# Patient Record
Sex: FEMALE | Race: WHITE | NOT HISPANIC OR LATINO
[De-identification: names, ages, dates, MRNs, and addresses within clinical notes are randomized per-mention and may not be internally consistent; named-entity substitution may affect disease eponyms.]

---

## 2017-03-03 ENCOUNTER — RX RENEWAL (OUTPATIENT)
Age: 82
End: 2017-03-03

## 2017-05-22 ENCOUNTER — APPOINTMENT (OUTPATIENT)
Dept: CARDIOLOGY | Facility: CLINIC | Age: 82
End: 2017-05-22

## 2017-05-22 ENCOUNTER — NON-APPOINTMENT (OUTPATIENT)
Age: 82
End: 2017-05-22

## 2017-05-22 VITALS
RESPIRATION RATE: 16 BRPM | OXYGEN SATURATION: 100 % | HEIGHT: 62 IN | BODY MASS INDEX: 21.71 KG/M2 | HEART RATE: 74 BPM | DIASTOLIC BLOOD PRESSURE: 88 MMHG | SYSTOLIC BLOOD PRESSURE: 160 MMHG | WEIGHT: 118 LBS

## 2017-05-22 VITALS — SYSTOLIC BLOOD PRESSURE: 152 MMHG | DIASTOLIC BLOOD PRESSURE: 80 MMHG

## 2017-05-22 VITALS — SYSTOLIC BLOOD PRESSURE: 144 MMHG | DIASTOLIC BLOOD PRESSURE: 79 MMHG

## 2017-05-26 ENCOUNTER — RX RENEWAL (OUTPATIENT)
Age: 82
End: 2017-05-26

## 2017-06-12 ENCOUNTER — RX RENEWAL (OUTPATIENT)
Age: 82
End: 2017-06-12

## 2017-06-28 ENCOUNTER — APPOINTMENT (OUTPATIENT)
Dept: FAMILY MEDICINE | Facility: CLINIC | Age: 82
End: 2017-06-28

## 2017-06-28 VITALS
TEMPERATURE: 94.2 F | HEART RATE: 92 BPM | WEIGHT: 115 LBS | SYSTOLIC BLOOD PRESSURE: 120 MMHG | DIASTOLIC BLOOD PRESSURE: 70 MMHG | OXYGEN SATURATION: 97 % | HEIGHT: 62 IN | BODY MASS INDEX: 21.16 KG/M2 | RESPIRATION RATE: 16 BRPM

## 2017-06-28 VITALS — OXYGEN SATURATION: 98 % | TEMPERATURE: 98.2 F

## 2017-07-03 ENCOUNTER — RX RENEWAL (OUTPATIENT)
Age: 82
End: 2017-07-03

## 2017-07-06 ENCOUNTER — FORM ENCOUNTER (OUTPATIENT)
Age: 82
End: 2017-07-06

## 2017-07-07 ENCOUNTER — APPOINTMENT (OUTPATIENT)
Dept: RADIOLOGY | Facility: HOSPITAL | Age: 82
End: 2017-07-07

## 2017-07-07 ENCOUNTER — OUTPATIENT (OUTPATIENT)
Dept: OUTPATIENT SERVICES | Facility: HOSPITAL | Age: 82
LOS: 1 days | End: 2017-07-07
Payer: MEDICARE

## 2017-07-07 DIAGNOSIS — Z90.710 ACQUIRED ABSENCE OF BOTH CERVIX AND UTERUS: Chronic | ICD-10-CM

## 2017-07-07 DIAGNOSIS — R05 COUGH: ICD-10-CM

## 2017-07-07 DIAGNOSIS — Z98.89 OTHER SPECIFIED POSTPROCEDURAL STATES: Chronic | ICD-10-CM

## 2017-07-07 DIAGNOSIS — Z98.49 CATARACT EXTRACTION STATUS, UNSPECIFIED EYE: Chronic | ICD-10-CM

## 2017-07-08 ENCOUNTER — EMERGENCY (EMERGENCY)
Facility: HOSPITAL | Age: 82
LOS: 1 days | Discharge: HOME | End: 2017-07-08
Admitting: EMERGENCY MEDICINE
Payer: MEDICARE

## 2017-07-08 DIAGNOSIS — R50.9 FEVER, UNSPECIFIED: ICD-10-CM

## 2017-07-08 DIAGNOSIS — Z98.49 CATARACT EXTRACTION STATUS, UNSPECIFIED EYE: Chronic | ICD-10-CM

## 2017-07-08 DIAGNOSIS — Z90.710 ACQUIRED ABSENCE OF BOTH CERVIX AND UTERUS: Chronic | ICD-10-CM

## 2017-07-08 DIAGNOSIS — Z79.899 OTHER LONG TERM (CURRENT) DRUG THERAPY: ICD-10-CM

## 2017-07-08 DIAGNOSIS — E78.00 PURE HYPERCHOLESTEROLEMIA, UNSPECIFIED: ICD-10-CM

## 2017-07-08 DIAGNOSIS — Z95.5 PRESENCE OF CORONARY ANGIOPLASTY IMPLANT AND GRAFT: ICD-10-CM

## 2017-07-08 DIAGNOSIS — E03.9 HYPOTHYROIDISM, UNSPECIFIED: ICD-10-CM

## 2017-07-08 DIAGNOSIS — Z98.89 OTHER SPECIFIED POSTPROCEDURAL STATES: Chronic | ICD-10-CM

## 2017-07-08 DIAGNOSIS — R05 COUGH: ICD-10-CM

## 2017-07-08 DIAGNOSIS — I10 ESSENTIAL (PRIMARY) HYPERTENSION: ICD-10-CM

## 2017-07-08 DIAGNOSIS — R53.1 WEAKNESS: ICD-10-CM

## 2017-07-08 DIAGNOSIS — Z79.82 LONG TERM (CURRENT) USE OF ASPIRIN: ICD-10-CM

## 2017-07-08 DIAGNOSIS — Z87.891 PERSONAL HISTORY OF NICOTINE DEPENDENCE: ICD-10-CM

## 2017-07-08 PROCEDURE — 93010 ELECTROCARDIOGRAM REPORT: CPT

## 2017-07-08 PROCEDURE — 99284 EMERGENCY DEPT VISIT MOD MDM: CPT

## 2017-07-08 PROCEDURE — 71010: CPT | Mod: 26

## 2017-07-09 PROCEDURE — 83605 ASSAY OF LACTIC ACID: CPT

## 2017-07-09 PROCEDURE — 87086 URINE CULTURE/COLONY COUNT: CPT

## 2017-07-09 PROCEDURE — 81003 URINALYSIS AUTO W/O SCOPE: CPT

## 2017-07-09 PROCEDURE — 85027 COMPLETE CBC AUTOMATED: CPT

## 2017-07-09 PROCEDURE — 71046 X-RAY EXAM CHEST 2 VIEWS: CPT

## 2017-07-09 PROCEDURE — 96365 THER/PROPH/DIAG IV INF INIT: CPT

## 2017-07-09 PROCEDURE — 93005 ELECTROCARDIOGRAM TRACING: CPT

## 2017-07-09 PROCEDURE — 87040 BLOOD CULTURE FOR BACTERIA: CPT

## 2017-07-09 PROCEDURE — 80048 BASIC METABOLIC PNL TOTAL CA: CPT

## 2017-07-09 PROCEDURE — 96375 TX/PRO/DX INJ NEW DRUG ADDON: CPT

## 2017-07-09 PROCEDURE — 99284 EMERGENCY DEPT VISIT MOD MDM: CPT | Mod: 25

## 2017-07-09 PROCEDURE — 85610 PROTHROMBIN TIME: CPT

## 2017-07-09 PROCEDURE — 85730 THROMBOPLASTIN TIME PARTIAL: CPT

## 2017-07-09 PROCEDURE — 71045 X-RAY EXAM CHEST 1 VIEW: CPT

## 2017-07-17 ENCOUNTER — LABORATORY RESULT (OUTPATIENT)
Age: 82
End: 2017-07-17

## 2017-07-17 ENCOUNTER — APPOINTMENT (OUTPATIENT)
Dept: FAMILY MEDICINE | Facility: CLINIC | Age: 82
End: 2017-07-17

## 2017-07-17 VITALS
TEMPERATURE: 98 F | RESPIRATION RATE: 16 BRPM | BODY MASS INDEX: 21.71 KG/M2 | HEIGHT: 62 IN | DIASTOLIC BLOOD PRESSURE: 80 MMHG | HEART RATE: 80 BPM | SYSTOLIC BLOOD PRESSURE: 150 MMHG | WEIGHT: 118 LBS | OXYGEN SATURATION: 98 %

## 2017-07-17 RX ORDER — LEVOFLOXACIN 750 MG/1
750 TABLET, FILM COATED ORAL DAILY
Qty: 7 | Refills: 0 | Status: DISCONTINUED | COMMUNITY
Start: 2017-07-07 | End: 2017-07-17

## 2017-07-18 LAB
ALBUMIN SERPL ELPH-MCNC: 3.5 G/DL
ANION GAP SERPL CALC-SCNC: 15 MMOL/L
BASOPHILS # BLD AUTO: 0.03 K/UL
BASOPHILS NFR BLD AUTO: 0.6 %
BUN SERPL-MCNC: 12 MG/DL
CALCIUM SERPL-MCNC: 9.8 MG/DL
CHLORIDE SERPL-SCNC: 88 MMOL/L
CO2 SERPL-SCNC: 22 MMOL/L
CREAT SERPL-MCNC: 0.78 MG/DL
EOSINOPHIL # BLD AUTO: 0.08 K/UL
EOSINOPHIL NFR BLD AUTO: 1.7 %
GLUCOSE SERPL-MCNC: 94 MG/DL
HCT VFR BLD CALC: 32.2 %
HGB BLD-MCNC: 10.7 G/DL
IMM GRANULOCYTES NFR BLD AUTO: 0.2 %
LYMPHOCYTES # BLD AUTO: 1.23 K/UL
LYMPHOCYTES NFR BLD AUTO: 25.5 %
MAN DIFF?: NORMAL
MCHC RBC-ENTMCNC: 27.3 PG
MCHC RBC-ENTMCNC: 33.2 GM/DL
MCV RBC AUTO: 82.1 FL
MONOCYTES # BLD AUTO: 0.47 K/UL
MONOCYTES NFR BLD AUTO: 9.7 %
NEUTROPHILS # BLD AUTO: 3.01 K/UL
NEUTROPHILS NFR BLD AUTO: 62.3 %
PHOSPHATE SERPL-MCNC: 3.2 MG/DL
PLATELET # BLD AUTO: 330 K/UL
POTASSIUM SERPL-SCNC: 3.8 MMOL/L
RBC # BLD: 3.92 M/UL
RBC # FLD: 15.1 %
SODIUM SERPL-SCNC: 125 MMOL/L
TSH SERPL-ACNC: 1.27 UIU/ML
WBC # FLD AUTO: 4.83 K/UL

## 2017-08-23 ENCOUNTER — RX RENEWAL (OUTPATIENT)
Age: 82
End: 2017-08-23

## 2017-10-23 ENCOUNTER — MEDICATION RENEWAL (OUTPATIENT)
Age: 82
End: 2017-10-23

## 2017-10-25 ENCOUNTER — MEDICATION RENEWAL (OUTPATIENT)
Age: 82
End: 2017-10-25

## 2017-10-31 ENCOUNTER — FORM ENCOUNTER (OUTPATIENT)
Age: 82
End: 2017-10-31

## 2017-10-31 ENCOUNTER — RX RENEWAL (OUTPATIENT)
Age: 82
End: 2017-10-31

## 2017-11-01 ENCOUNTER — APPOINTMENT (OUTPATIENT)
Dept: MAMMOGRAPHY | Facility: HOSPITAL | Age: 82
End: 2017-11-01
Payer: MEDICARE

## 2017-11-01 ENCOUNTER — OUTPATIENT (OUTPATIENT)
Dept: OUTPATIENT SERVICES | Facility: HOSPITAL | Age: 82
LOS: 1 days | End: 2017-11-01
Payer: MEDICARE

## 2017-11-01 DIAGNOSIS — Z12.39 ENCOUNTER FOR OTHER SCREENING FOR MALIGNANT NEOPLASM OF BREAST: ICD-10-CM

## 2017-11-01 DIAGNOSIS — Z90.710 ACQUIRED ABSENCE OF BOTH CERVIX AND UTERUS: Chronic | ICD-10-CM

## 2017-11-01 DIAGNOSIS — Z98.89 OTHER SPECIFIED POSTPROCEDURAL STATES: Chronic | ICD-10-CM

## 2017-11-01 DIAGNOSIS — Z98.49 CATARACT EXTRACTION STATUS, UNSPECIFIED EYE: Chronic | ICD-10-CM

## 2017-11-01 DIAGNOSIS — Z12.31 ENCOUNTER FOR SCREENING MAMMOGRAM FOR MALIGNANT NEOPLASM OF BREAST: ICD-10-CM

## 2017-11-01 PROCEDURE — 77063 BREAST TOMOSYNTHESIS BI: CPT | Mod: 26

## 2017-11-01 PROCEDURE — 77067 SCR MAMMO BI INCL CAD: CPT

## 2017-11-01 PROCEDURE — G0202: CPT | Mod: 26

## 2017-11-01 PROCEDURE — 77063 BREAST TOMOSYNTHESIS BI: CPT

## 2017-11-16 ENCOUNTER — APPOINTMENT (OUTPATIENT)
Dept: CARDIOLOGY | Facility: CLINIC | Age: 82
End: 2017-11-16
Payer: MEDICARE

## 2017-11-16 ENCOUNTER — NON-APPOINTMENT (OUTPATIENT)
Age: 82
End: 2017-11-16

## 2017-11-16 VITALS
SYSTOLIC BLOOD PRESSURE: 165 MMHG | WEIGHT: 113 LBS | HEART RATE: 81 BPM | HEIGHT: 62 IN | DIASTOLIC BLOOD PRESSURE: 83 MMHG | OXYGEN SATURATION: 100 % | BODY MASS INDEX: 20.8 KG/M2 | RESPIRATION RATE: 16 BRPM

## 2017-11-16 VITALS — DIASTOLIC BLOOD PRESSURE: 80 MMHG | SYSTOLIC BLOOD PRESSURE: 154 MMHG

## 2017-11-16 VITALS — DIASTOLIC BLOOD PRESSURE: 80 MMHG | SYSTOLIC BLOOD PRESSURE: 152 MMHG

## 2017-11-16 PROCEDURE — 93000 ELECTROCARDIOGRAM COMPLETE: CPT

## 2017-11-16 PROCEDURE — 99214 OFFICE O/P EST MOD 30 MIN: CPT

## 2017-11-26 ENCOUNTER — RX RENEWAL (OUTPATIENT)
Age: 82
End: 2017-11-26

## 2018-02-06 ENCOUNTER — APPOINTMENT (OUTPATIENT)
Dept: FAMILY MEDICINE | Facility: CLINIC | Age: 83
End: 2018-02-06
Payer: MEDICARE

## 2018-02-06 VITALS
WEIGHT: 115 LBS | SYSTOLIC BLOOD PRESSURE: 170 MMHG | RESPIRATION RATE: 14 BRPM | BODY MASS INDEX: 21.16 KG/M2 | OXYGEN SATURATION: 99 % | TEMPERATURE: 97 F | HEART RATE: 92 BPM | HEIGHT: 62 IN | DIASTOLIC BLOOD PRESSURE: 100 MMHG

## 2018-02-06 LAB
BILIRUB UR QL STRIP: NORMAL
GLUCOSE UR-MCNC: NORMAL
HCG UR QL: 0.2 EU/DL
HGB UR QL STRIP.AUTO: NORMAL
KETONES UR-MCNC: NORMAL
LEUKOCYTE ESTERASE UR QL STRIP: NORMAL
NITRITE UR QL STRIP: NORMAL
PH UR STRIP: 8.5
PROT UR STRIP-MCNC: NORMAL
SP GR UR STRIP: 1.01

## 2018-02-06 PROCEDURE — 99213 OFFICE O/P EST LOW 20 MIN: CPT | Mod: 25

## 2018-02-06 PROCEDURE — 81003 URINALYSIS AUTO W/O SCOPE: CPT | Mod: QW

## 2018-02-25 ENCOUNTER — RX RENEWAL (OUTPATIENT)
Age: 83
End: 2018-02-25

## 2018-03-04 ENCOUNTER — RX RENEWAL (OUTPATIENT)
Age: 83
End: 2018-03-04

## 2018-04-12 ENCOUNTER — NON-APPOINTMENT (OUTPATIENT)
Age: 83
End: 2018-04-12

## 2018-04-12 ENCOUNTER — APPOINTMENT (OUTPATIENT)
Dept: CARDIOLOGY | Facility: CLINIC | Age: 83
End: 2018-04-12
Payer: MEDICARE

## 2018-04-12 VITALS
SYSTOLIC BLOOD PRESSURE: 156 MMHG | HEART RATE: 88 BPM | OXYGEN SATURATION: 99 % | WEIGHT: 115 LBS | HEIGHT: 62 IN | BODY MASS INDEX: 21.16 KG/M2 | RESPIRATION RATE: 16 BRPM | DIASTOLIC BLOOD PRESSURE: 81 MMHG

## 2018-04-12 PROCEDURE — 93000 ELECTROCARDIOGRAM COMPLETE: CPT

## 2018-04-12 PROCEDURE — 99214 OFFICE O/P EST MOD 30 MIN: CPT

## 2018-04-17 ENCOUNTER — FORM ENCOUNTER (OUTPATIENT)
Age: 83
End: 2018-04-17

## 2018-04-17 ENCOUNTER — APPOINTMENT (OUTPATIENT)
Dept: FAMILY MEDICINE | Facility: CLINIC | Age: 83
End: 2018-04-17
Payer: MEDICARE

## 2018-04-17 VITALS
HEART RATE: 84 BPM | BODY MASS INDEX: 21.16 KG/M2 | RESPIRATION RATE: 15 BRPM | SYSTOLIC BLOOD PRESSURE: 124 MMHG | HEIGHT: 62 IN | TEMPERATURE: 97 F | WEIGHT: 115 LBS | OXYGEN SATURATION: 98 % | DIASTOLIC BLOOD PRESSURE: 80 MMHG

## 2018-04-17 DIAGNOSIS — R19.5 OTHER FECAL ABNORMALITIES: ICD-10-CM

## 2018-04-17 PROCEDURE — 99214 OFFICE O/P EST MOD 30 MIN: CPT

## 2018-04-17 RX ORDER — CIPROFLOXACIN HYDROCHLORIDE 500 MG/1
500 TABLET, FILM COATED ORAL TWICE DAILY
Qty: 10 | Refills: 0 | Status: COMPLETED | COMMUNITY
Start: 2018-02-06 | End: 2018-04-17

## 2018-04-18 ENCOUNTER — OUTPATIENT (OUTPATIENT)
Dept: OUTPATIENT SERVICES | Facility: HOSPITAL | Age: 83
LOS: 1 days | End: 2018-04-18
Payer: MEDICARE

## 2018-04-18 ENCOUNTER — APPOINTMENT (OUTPATIENT)
Dept: RADIOLOGY | Facility: HOSPITAL | Age: 83
End: 2018-04-18
Payer: MEDICARE

## 2018-04-18 DIAGNOSIS — R05 COUGH: ICD-10-CM

## 2018-04-18 DIAGNOSIS — Z98.49 CATARACT EXTRACTION STATUS, UNSPECIFIED EYE: Chronic | ICD-10-CM

## 2018-04-18 DIAGNOSIS — Z90.710 ACQUIRED ABSENCE OF BOTH CERVIX AND UTERUS: Chronic | ICD-10-CM

## 2018-04-18 DIAGNOSIS — J44.9 CHRONIC OBSTRUCTIVE PULMONARY DISEASE, UNSPECIFIED: ICD-10-CM

## 2018-04-18 DIAGNOSIS — K44.9 DIAPHRAGMATIC HERNIA WITHOUT OBSTRUCTION OR GANGRENE: ICD-10-CM

## 2018-04-18 DIAGNOSIS — Z98.89 OTHER SPECIFIED POSTPROCEDURAL STATES: Chronic | ICD-10-CM

## 2018-04-18 PROCEDURE — 71046 X-RAY EXAM CHEST 2 VIEWS: CPT | Mod: 26

## 2018-04-18 PROCEDURE — 71046 X-RAY EXAM CHEST 2 VIEWS: CPT

## 2018-05-14 ENCOUNTER — RX RENEWAL (OUTPATIENT)
Age: 83
End: 2018-05-14

## 2018-05-14 DIAGNOSIS — T78.40XA ALLERGY, UNSPECIFIED, INITIAL ENCOUNTER: ICD-10-CM

## 2018-05-21 ENCOUNTER — RX RENEWAL (OUTPATIENT)
Age: 83
End: 2018-05-21

## 2018-06-13 ENCOUNTER — APPOINTMENT (OUTPATIENT)
Dept: FAMILY MEDICINE | Facility: CLINIC | Age: 83
End: 2018-06-13
Payer: MEDICARE

## 2018-06-13 ENCOUNTER — RESULT CHARGE (OUTPATIENT)
Age: 83
End: 2018-06-13

## 2018-06-13 VITALS
BODY MASS INDEX: 20.85 KG/M2 | RESPIRATION RATE: 14 BRPM | OXYGEN SATURATION: 98 % | DIASTOLIC BLOOD PRESSURE: 72 MMHG | TEMPERATURE: 98.4 F | SYSTOLIC BLOOD PRESSURE: 130 MMHG | HEART RATE: 94 BPM | WEIGHT: 114 LBS

## 2018-06-13 LAB
BILIRUB UR QL STRIP: NEGATIVE
CLARITY UR: CLEAR
COLLECTION METHOD: NORMAL
GLUCOSE UR-MCNC: NEGATIVE
HCG UR QL: 0.2 EU/DL
HGB UR QL STRIP.AUTO: NEGATIVE
KETONES UR-MCNC: NEGATIVE
LEUKOCYTE ESTERASE UR QL STRIP: NEGATIVE
NITRITE UR QL STRIP: NEGATIVE
PH UR STRIP: 7
PROT UR STRIP-MCNC: NEGATIVE
SP GR UR STRIP: 1.01

## 2018-06-13 PROCEDURE — 99213 OFFICE O/P EST LOW 20 MIN: CPT

## 2018-06-13 RX ORDER — BENZONATATE 100 MG/1
100 CAPSULE ORAL EVERY 8 HOURS
Qty: 21 | Refills: 0 | Status: DISCONTINUED | COMMUNITY
Start: 2018-04-12 | End: 2018-06-13

## 2018-06-13 RX ORDER — AZITHROMYCIN 250 MG/1
250 TABLET, FILM COATED ORAL
Qty: 1 | Refills: 0 | Status: DISCONTINUED | COMMUNITY
Start: 2017-06-28 | End: 2018-06-13

## 2018-06-13 RX ORDER — LORATADINE 10 MG/1
10 TABLET ORAL
Qty: 20 | Refills: 0 | Status: DISCONTINUED | COMMUNITY
Start: 2018-05-14 | End: 2018-06-13

## 2018-06-13 NOTE — HISTORY OF PRESENT ILLNESS
[FreeTextEntry1] : cough for two weeks [de-identified] : previously seen by Dr. Polo for same complaint. Failed a trial of benzonatate.\par Patient describes a cough which is worse during the day when she is up and walking around. Denies orthopnea.  No fever, chills or night sweats no itchy eyes, stuffy nose or rhinorrhea. No history of seasonal allergies.\par \par Former smoker, quit ~ 20 years ago\par Xray chest shows COPD

## 2018-06-13 NOTE — PHYSICAL EXAM
[No Acute Distress] : no acute distress [Well Nourished] : well nourished [Well Developed] : well developed [Well-Appearing] : well-appearing [PERRL] : pupils equal round and reactive to light [EOMI] : extraocular movements intact [Normal Outer Ear/Nose] : the outer ears and nose were normal in appearance [No JVD] : no jugular venous distention [Supple] : supple [No Lymphadenopathy] : no lymphadenopathy [Thyroid Normal, No Nodules] : the thyroid was normal and there were no nodules present [No Respiratory Distress] : no respiratory distress  [Clear to Auscultation] : lungs were clear to auscultation bilaterally [No Accessory Muscle Use] : no accessory muscle use [Normal Rate] : normal rate  [Regular Rhythm] : with a regular rhythm [Normal S1, S2] : normal S1 and S2 [No Murmur] : no murmur heard [No Carotid Bruits] : no carotid bruits [No Abdominal Bruit] : a ~M bruit was not heard ~T in the abdomen [No Varicosities] : no varicosities [Pedal Pulses Present] : the pedal pulses are present [No Edema] : there was no peripheral edema [No Extremity Clubbing/Cyanosis] : no extremity clubbing/cyanosis [No Palpable Aorta] : no palpable aorta [Soft] : abdomen soft [Non Tender] : non-tender [Non-distended] : non-distended [No Masses] : no abdominal mass palpated [No HSM] : no HSM [Normal Bowel Sounds] : normal bowel sounds [Normal Posterior Cervical Nodes] : no posterior cervical lymphadenopathy [Normal Anterior Cervical Nodes] : no anterior cervical lymphadenopathy [No CVA Tenderness] : no CVA  tenderness [No Spinal Tenderness] : no spinal tenderness [No Joint Swelling] : no joint swelling [Grossly Normal Strength/Tone] : grossly normal strength/tone [No Rash] : no rash [Normal Affect] : the affect was normal [Normal Insight/Judgement] : insight and judgment were intact [de-identified] : thin, elderly woman [de-identified] : mild conjunctival, nasal and pharyngeal mucosal erythema [de-identified] : hard of hearing

## 2018-06-25 ENCOUNTER — RX RENEWAL (OUTPATIENT)
Age: 83
End: 2018-06-25

## 2018-07-19 ENCOUNTER — APPOINTMENT (OUTPATIENT)
Dept: FAMILY MEDICINE | Facility: CLINIC | Age: 83
End: 2018-07-19
Payer: MEDICARE

## 2018-07-19 VITALS
DIASTOLIC BLOOD PRESSURE: 82 MMHG | OXYGEN SATURATION: 98 % | BODY MASS INDEX: 21.03 KG/M2 | WEIGHT: 115 LBS | HEART RATE: 79 BPM | SYSTOLIC BLOOD PRESSURE: 150 MMHG | TEMPERATURE: 97.8 F | RESPIRATION RATE: 14 BRPM

## 2018-07-19 PROCEDURE — 99214 OFFICE O/P EST MOD 30 MIN: CPT

## 2018-07-19 NOTE — PHYSICAL EXAM
[No Acute Distress] : no acute distress [Well Nourished] : well nourished [Well Developed] : well developed [Well-Appearing] : well-appearing [Clear to Auscultation] : lungs were clear to auscultation bilaterally [No Accessory Muscle Use] : no accessory muscle use [Regular Rhythm] : with a regular rhythm [Normal S1, S2] : normal S1 and S2 [de-identified] : 2 cm scrape of midlateral LLE with little yellow discharge wet peripheral erythema mild swelling warm to touch no streaking no pain on ROM foot or toes good cap refill

## 2018-07-19 NOTE — REVIEW OF SYSTEMS
[Mole Changes] : no mole changes [Nail Changes] : no nail changes [Hair Changes] : no hair changes [Skin Rash] : skin rash [Negative] : Heme/Lymph [de-identified] : skin trauma to LLE now swelling discharge from lesion and redness warm gettign worse

## 2018-07-19 NOTE — HISTORY OF PRESENT ILLNESS
[Rash (Location) ___] : rash on [unfilled] [Moderate] : moderate [___ Weeks ago] :  [unfilled] weeks ago [OTC Remedies] : OTC remedies [Worsening] : worsening [de-identified] : 1 weeks ago scraped the leg on car door and now getting skin red in area and some discharge [FreeTextEntry5] : Neosporin  [FreeTextEntry8] : Pt here with swelling and opened skin LLE after last week Wednesday with the car door scraped her leg. Pt no pain now but stes has some discharge and now is getting red in area nd swelling that was not present before. pt no fevers chills or systemic complaints

## 2018-07-19 NOTE — COUNSELING
[None] : None [Good understanding] : Patient has a good understanding of lifestyle changes and the steps needed to achieve self management goals [de-identified] : skin care d/w pt and daughter\par leg elevation and warm compresses and keeping area clean and covered\par abx given advised yogurt or Probiotic \par rtc if borders advance or not improving

## 2018-08-15 ENCOUNTER — RX RENEWAL (OUTPATIENT)
Age: 83
End: 2018-08-15

## 2018-08-22 ENCOUNTER — MEDICATION RENEWAL (OUTPATIENT)
Age: 83
End: 2018-08-22

## 2018-10-04 ENCOUNTER — APPOINTMENT (OUTPATIENT)
Dept: CARDIOLOGY | Facility: CLINIC | Age: 83
End: 2018-10-04
Payer: MEDICARE

## 2018-10-04 ENCOUNTER — NON-APPOINTMENT (OUTPATIENT)
Age: 83
End: 2018-10-04

## 2018-10-04 VITALS
HEART RATE: 75 BPM | RESPIRATION RATE: 15 BRPM | DIASTOLIC BLOOD PRESSURE: 95 MMHG | HEIGHT: 62 IN | SYSTOLIC BLOOD PRESSURE: 170 MMHG | OXYGEN SATURATION: 100 % | WEIGHT: 115 LBS | BODY MASS INDEX: 21.16 KG/M2

## 2018-10-04 VITALS — DIASTOLIC BLOOD PRESSURE: 86 MMHG | SYSTOLIC BLOOD PRESSURE: 154 MMHG

## 2018-10-04 PROCEDURE — G0008: CPT

## 2018-10-04 PROCEDURE — 90662 IIV NO PRSV INCREASED AG IM: CPT

## 2018-10-04 PROCEDURE — 93000 ELECTROCARDIOGRAM COMPLETE: CPT

## 2018-10-04 PROCEDURE — 99214 OFFICE O/P EST MOD 30 MIN: CPT | Mod: 25

## 2018-10-15 ENCOUNTER — RX RENEWAL (OUTPATIENT)
Age: 83
End: 2018-10-15

## 2018-10-16 ENCOUNTER — RX RENEWAL (OUTPATIENT)
Age: 83
End: 2018-10-16

## 2019-01-07 ENCOUNTER — MEDICATION RENEWAL (OUTPATIENT)
Age: 84
End: 2019-01-07

## 2019-02-03 PROBLEM — T78.40XA ALLERGY: Status: ACTIVE | Noted: 2018-05-14

## 2019-02-12 ENCOUNTER — RX RENEWAL (OUTPATIENT)
Age: 84
End: 2019-02-12

## 2019-02-18 ENCOUNTER — RX RENEWAL (OUTPATIENT)
Age: 84
End: 2019-02-18

## 2019-04-04 ENCOUNTER — APPOINTMENT (OUTPATIENT)
Dept: CARDIOLOGY | Facility: CLINIC | Age: 84
End: 2019-04-04
Payer: MEDICARE

## 2019-04-04 ENCOUNTER — NON-APPOINTMENT (OUTPATIENT)
Age: 84
End: 2019-04-04

## 2019-04-04 VITALS
SYSTOLIC BLOOD PRESSURE: 138 MMHG | HEIGHT: 62 IN | BODY MASS INDEX: 21.53 KG/M2 | HEART RATE: 67 BPM | DIASTOLIC BLOOD PRESSURE: 85 MMHG | WEIGHT: 117 LBS | OXYGEN SATURATION: 98 % | RESPIRATION RATE: 16 BRPM

## 2019-04-04 PROCEDURE — 93000 ELECTROCARDIOGRAM COMPLETE: CPT

## 2019-04-04 PROCEDURE — 99214 OFFICE O/P EST MOD 30 MIN: CPT

## 2019-04-04 NOTE — ASSESSMENT
[FreeTextEntry1] : 89-year-old woman with CAD, residual nonobstructive disease with last angiogram asymptomatic. Asymptomatic PVC. Hypertension.

## 2019-04-04 NOTE — REVIEW OF SYSTEMS
[Feeling Fatigued] : not feeling fatigued [Eyeglasses] : currently wearing eyeglasses [Loss Of Hearing] : hearing loss [Cough] : no cough [Joint Pain] : joint pain [Joint Stiffness] : joint stiffness [see HPI] : see HPI [Negative] : Heme/Lymph

## 2019-04-04 NOTE — PHYSICAL EXAM
[General Appearance - Well Developed] : well developed [Normal Appearance] : normal appearance [Well Groomed] : well groomed [General Appearance - Well Nourished] : well nourished [No Deformities] : no deformities [General Appearance - In No Acute Distress] : no acute distress [Normal Conjunctiva] : the conjunctiva exhibited no abnormalities [Eyelids - No Xanthelasma] : the eyelids demonstrated no xanthelasmas [Normal Oral Mucosa] : normal oral mucosa [No Oral Pallor] : no oral pallor [No Oral Cyanosis] : no oral cyanosis [FreeTextEntry1] : No JVD. No bruits. [Respiration, Rhythm And Depth] : normal respiratory rhythm and effort [Exaggerated Use Of Accessory Muscles For Inspiration] : no accessory muscle use [Auscultation Breath Sounds / Voice Sounds] : lungs were clear to auscultation bilaterally [Heart Rate And Rhythm] : heart rate and rhythm were normal [Heart Sounds] : normal S1 and S2 [Murmurs] : no murmurs present [Arterial Pulses Normal] : the arterial pulses were normal [Edema] : no peripheral edema present [Abdomen Soft] : soft [Abdomen Tenderness] : non-tender [Abdomen Mass (___ Cm)] : no abdominal mass palpated [Nail Clubbing] : no clubbing of the fingernails [Cyanosis, Localized] : no localized cyanosis [] : no ischemic changes [Skin Color & Pigmentation] : normal skin color and pigmentation [No Venous Stasis] : no venous stasis [No Skin Ulcers] : no skin ulcer [Affect] : the affect was normal [Mood] : the mood was normal

## 2019-04-04 NOTE — HISTORY OF PRESENT ILLNESS
[FreeTextEntry1] : She has been doing well without cardiac symptoms of chest pain dyspnea palpitations or edema. No interval significant health problems or change in medication.

## 2019-04-04 NOTE — DISCUSSION/SUMMARY
[Patient] : the patient [Risks] : risks [Benefits] : benefits [Alternatives] : alternatives [___ Month(s)] : [unfilled] month(s) [FreeTextEntry1] : Reviewed and discussed with patient and family. Restrictions and temperature extremes discussed hydration. Medications reviewed continue same. Periodic blood testing recommended. See me again in 6 months. Followup with SAFIA Kasper

## 2019-04-04 NOTE — REASON FOR VISIT
[Coronary Artery Disease] : coronary artery disease [Medication Management] : Medication management [FreeTextEntry1] : \par \par \par \par \par \par \par \par \par  [Family Member] : family member

## 2019-05-02 ENCOUNTER — RX RENEWAL (OUTPATIENT)
Age: 84
End: 2019-05-02

## 2019-05-03 ENCOUNTER — APPOINTMENT (OUTPATIENT)
Dept: FAMILY MEDICINE | Facility: CLINIC | Age: 84
End: 2019-05-03
Payer: MEDICARE

## 2019-05-03 VITALS
RESPIRATION RATE: 15 BRPM | HEART RATE: 81 BPM | BODY MASS INDEX: 21.16 KG/M2 | DIASTOLIC BLOOD PRESSURE: 85 MMHG | SYSTOLIC BLOOD PRESSURE: 140 MMHG | TEMPERATURE: 98 F | OXYGEN SATURATION: 98 % | HEIGHT: 62 IN | WEIGHT: 115 LBS

## 2019-05-03 DIAGNOSIS — Z87.898 PERSONAL HISTORY OF OTHER SPECIFIED CONDITIONS: ICD-10-CM

## 2019-05-03 DIAGNOSIS — Z86.59 PERSONAL HISTORY OF OTHER MENTAL AND BEHAVIORAL DISORDERS: ICD-10-CM

## 2019-05-03 DIAGNOSIS — J18.1 LOBAR PNEUMONIA, UNSPECIFIED ORGANISM: ICD-10-CM

## 2019-05-03 DIAGNOSIS — Z12.31 ENCOUNTER FOR SCREENING MAMMOGRAM FOR MALIGNANT NEOPLASM OF BREAST: ICD-10-CM

## 2019-05-03 DIAGNOSIS — L03.119 CELLULITIS OF UNSPECIFIED PART OF LIMB: ICD-10-CM

## 2019-05-03 DIAGNOSIS — Z92.29 PERSONAL HISTORY OF OTHER DRUG THERAPY: ICD-10-CM

## 2019-05-03 DIAGNOSIS — I87.8 OTHER SPECIFIED DISORDERS OF VEINS: ICD-10-CM

## 2019-05-03 PROCEDURE — G0009: CPT

## 2019-05-03 PROCEDURE — 90732 PPSV23 VACC 2 YRS+ SUBQ/IM: CPT

## 2019-05-03 PROCEDURE — G0439: CPT

## 2019-05-03 RX ORDER — SULFAMETHOXAZOLE AND TRIMETHOPRIM 800; 160 MG/1; MG/1
800-160 TABLET ORAL TWICE DAILY
Qty: 14 | Refills: 0 | Status: COMPLETED | COMMUNITY
Start: 2018-07-19 | End: 2019-05-03

## 2019-05-03 RX ORDER — BACITRACIN 500 [IU]/G
500 OINTMENT TOPICAL 3 TIMES DAILY
Qty: 1 | Refills: 0 | Status: COMPLETED | COMMUNITY
Start: 2018-07-19 | End: 2019-05-03

## 2019-05-03 NOTE — HISTORY OF PRESENT ILLNESS
[FreeTextEntry1] : CPE [de-identified] : 89 y/o PMHX CAD HTN HLD COPD CKD Anemia here for annual CPE denies bowel  or bladder issues. pt lives by herself able to do all ADLs IADLs but now per daughter  Michaelle become forgetful at times. pt no CP SOB palpitation dizziness   Daughter at times states she c/o her moneys is being taken from her home. Believes is forgetful and got camera to check and so far no issues come up.

## 2019-05-03 NOTE — PHYSICAL EXAM
[No Acute Distress] : no acute distress [Well Nourished] : well nourished [Well Developed] : well developed [Well-Appearing] : well-appearing [Normal Sclera/Conjunctiva] : normal sclera/conjunctiva [PERRL] : pupils equal round and reactive to light [Normal Outer Ear/Nose] : the outer ears and nose were normal in appearance [EOMI] : extraocular movements intact [No JVD] : no jugular venous distention [No Lymphadenopathy] : no lymphadenopathy [Supple] : supple [Thyroid Normal, No Nodules] : the thyroid was normal and there were no nodules present [No Respiratory Distress] : no respiratory distress  [Clear to Auscultation] : lungs were clear to auscultation bilaterally [Regular Rhythm] : with a regular rhythm [Normal Rate] : normal rate  [No Accessory Muscle Use] : no accessory muscle use [Normal S1, S2] : normal S1 and S2 [No Abdominal Bruit] : a ~M bruit was not heard ~T in the abdomen [No Carotid Bruits] : no carotid bruits [Pedal Pulses Present] : the pedal pulses are present [No Varicosities] : no varicosities [No Edema] : there was no peripheral edema [No Extremity Clubbing/Cyanosis] : no extremity clubbing/cyanosis [Non Tender] : non-tender [Soft] : abdomen soft [No Palpable Aorta] : no palpable aorta [Non-distended] : non-distended [No Masses] : no abdominal mass palpated [No HSM] : no HSM [Normal Bowel Sounds] : normal bowel sounds [Normal Posterior Cervical Nodes] : no posterior cervical lymphadenopathy [Normal Anterior Cervical Nodes] : no anterior cervical lymphadenopathy [No CVA Tenderness] : no CVA  tenderness [No Spinal Tenderness] : no spinal tenderness [No Joint Swelling] : no joint swelling [Grossly Normal Strength/Tone] : grossly normal strength/tone [No Rash] : no rash [Normal Gait] : normal gait [Coordination Grossly Intact] : coordination grossly intact [Deep Tendon Reflexes (DTR)] : deep tendon reflexes were 2+ and symmetric [Normal Affect] : the affect was normal [No Focal Deficits] : no focal deficits [Normal Insight/Judgement] : insight and judgment were intact [de-identified] : full dentures [de-identified] : varicosities legs no giancarlo

## 2019-05-03 NOTE — COUNSELING
[Healthy eating counseling provided] : healthy eating [Activity counseling provided] : activity [Fall prevention counseling provided] : fall prevention  [___ min/wk activity recommended] : [unfilled] min/wk activity recommended [None] : None [Good understanding] : Patient has a good understanding of lifestyle changes and the steps needed to achieve self management goals

## 2019-05-03 NOTE — HEALTH RISK ASSESSMENT
[No falls in past year] : Patient reported no falls in the past year [0] : 2) Feeling down, depressed, or hopeless: Not at all (0) [HIV test declined] : HIV test declined [Hepatitis C test declined] : Hepatitis C test declined [None] : None [Alone] : lives alone [Fully functional (bathing, dressing, toileting, transferring, walking, feeding)] : Fully functional (bathing, dressing, toileting, transferring, walking, feeding) [Fully functional (using the telephone, shopping, preparing meals, housekeeping, doing laundry, using] : Fully functional and needs no help or supervision to perform IADLs (using the telephone, shopping, preparing meals, housekeeping, doing laundry, using transportation, managing medications and managing finances) [Reports changes in hearing] : Reports changes in hearing [Smoke Detector] : smoke detector [Carbon Monoxide Detector] : carbon monoxide detector [Safety elements used in home] : safety elements used in home [Seat Belt] :  uses seat belt [Name: ___] : Health Care Proxy's Name: [unfilled]  [I will adhere to the patient's wishes as expressed in the advance directive except as noted below.] : I will adhere to the patient's wishes as expressed in the advance directive except as noted below [Very Good] : ~his/her~  mood as very good [] : No [de-identified] : no [[id number]] : 0 [de-identified] : stopped many years ago [Patient declined mammogram] : Patient declined mammogram [Patient declined bone density test] : Patient declined bone density test [Patient declined colonoscopy] : Patient declined colonoscopy [Change in mental status noted] : No change in mental status noted [Sexually Active] : not sexually active [High Risk Behavior] : no high risk behavior [Reports changes in vision] : Reports no changes in vision [Reports changes in dental health] : Reports no changes in dental health [Guns at Home] : no guns at home [Sunscreen] : does not use sunscreen [de-identified] : hearing aids [de-identified] : 2 family home [place] door daughter visits 4 times a week [de-identified] : does not go on the sun [de-identified] : full dentures [FreeTextEntry4] : will update information per daughter

## 2019-05-17 ENCOUNTER — RX RENEWAL (OUTPATIENT)
Age: 84
End: 2019-05-17

## 2019-05-30 ENCOUNTER — RX RENEWAL (OUTPATIENT)
Age: 84
End: 2019-05-30

## 2019-06-11 ENCOUNTER — RX RENEWAL (OUTPATIENT)
Age: 84
End: 2019-06-11

## 2019-08-24 ENCOUNTER — INPATIENT (INPATIENT)
Facility: HOSPITAL | Age: 84
LOS: 2 days | Discharge: NURSING, REHAB OR LONG-TERM CARE | DRG: 683 | End: 2019-08-27
Attending: HOSPITALIST | Admitting: HOSPITALIST
Payer: MEDICARE

## 2019-08-24 VITALS
HEIGHT: 62 IN | WEIGHT: 119.93 LBS | OXYGEN SATURATION: 98 % | HEART RATE: 106 BPM | DIASTOLIC BLOOD PRESSURE: 82 MMHG | TEMPERATURE: 98 F | RESPIRATION RATE: 19 BRPM | SYSTOLIC BLOOD PRESSURE: 135 MMHG

## 2019-08-24 DIAGNOSIS — R29.898 OTHER SYMPTOMS AND SIGNS INVOLVING THE MUSCULOSKELETAL SYSTEM: ICD-10-CM

## 2019-08-24 DIAGNOSIS — Z90.710 ACQUIRED ABSENCE OF BOTH CERVIX AND UTERUS: Chronic | ICD-10-CM

## 2019-08-24 DIAGNOSIS — Z98.49 CATARACT EXTRACTION STATUS, UNSPECIFIED EYE: Chronic | ICD-10-CM

## 2019-08-24 DIAGNOSIS — Z98.89 OTHER SPECIFIED POSTPROCEDURAL STATES: Chronic | ICD-10-CM

## 2019-08-24 LAB
ALBUMIN SERPL ELPH-MCNC: 2.8 G/DL — LOW (ref 3.3–5)
ALP SERPL-CCNC: 112 U/L — SIGNIFICANT CHANGE UP (ref 40–120)
ALT FLD-CCNC: 12 U/L DA — SIGNIFICANT CHANGE UP (ref 10–45)
ANION GAP SERPL CALC-SCNC: 11 MMOL/L — SIGNIFICANT CHANGE UP (ref 5–17)
APPEARANCE UR: CLEAR — SIGNIFICANT CHANGE UP
APTT BLD: 24.5 SEC — LOW (ref 27.5–36.3)
AST SERPL-CCNC: 46 U/L — HIGH (ref 10–40)
BACTERIA # UR AUTO: ABNORMAL /HPF
BASOPHILS # BLD AUTO: 0.03 K/UL — SIGNIFICANT CHANGE UP (ref 0–0.2)
BASOPHILS NFR BLD AUTO: 0.2 % — SIGNIFICANT CHANGE UP (ref 0–2)
BILIRUB SERPL-MCNC: 1.4 MG/DL — HIGH (ref 0.2–1.2)
BILIRUB UR-MCNC: NEGATIVE — SIGNIFICANT CHANGE UP
BUN SERPL-MCNC: 29 MG/DL — HIGH (ref 7–23)
CALCIUM SERPL-MCNC: 9 MG/DL — SIGNIFICANT CHANGE UP (ref 8.4–10.5)
CHLORIDE SERPL-SCNC: 101 MMOL/L — SIGNIFICANT CHANGE UP (ref 96–108)
CK MB BLD-MCNC: 1.8 % — SIGNIFICANT CHANGE UP (ref 0–3.5)
CK MB CFR SERPL CALC: 6.9 NG/ML — SIGNIFICANT CHANGE UP (ref 0.5–10)
CK SERPL-CCNC: 393 U/L — HIGH (ref 25–170)
CO2 SERPL-SCNC: 24 MMOL/L — SIGNIFICANT CHANGE UP (ref 22–31)
COLOR SPEC: ABNORMAL
COMMENT - URINE: SIGNIFICANT CHANGE UP
CREAT SERPL-MCNC: 1.88 MG/DL — HIGH (ref 0.5–1.3)
DIFF PNL FLD: ABNORMAL
EOSINOPHIL # BLD AUTO: 0 K/UL — SIGNIFICANT CHANGE UP (ref 0–0.5)
EOSINOPHIL NFR BLD AUTO: 0 % — SIGNIFICANT CHANGE UP (ref 0–6)
EPI CELLS # UR: SIGNIFICANT CHANGE UP
GLUCOSE SERPL-MCNC: 128 MG/DL — HIGH (ref 70–99)
GLUCOSE UR QL: NEGATIVE — SIGNIFICANT CHANGE UP
HCT VFR BLD CALC: 41.3 % — SIGNIFICANT CHANGE UP (ref 34.5–45)
HGB BLD-MCNC: 13.4 G/DL — SIGNIFICANT CHANGE UP (ref 11.5–15.5)
IMM GRANULOCYTES NFR BLD AUTO: 0.7 % — SIGNIFICANT CHANGE UP (ref 0–1.5)
INR BLD: 1.07 RATIO — SIGNIFICANT CHANGE UP (ref 0.88–1.16)
KETONES UR-MCNC: ABNORMAL
LACTATE SERPL-SCNC: 1.9 MMOL/L — SIGNIFICANT CHANGE UP (ref 0.7–2)
LACTATE SERPL-SCNC: 2.3 MMOL/L — HIGH (ref 0.7–2)
LACTATE SERPL-SCNC: 4 MMOL/L — CRITICAL HIGH (ref 0.7–2)
LEUKOCYTE ESTERASE UR-ACNC: NEGATIVE — SIGNIFICANT CHANGE UP
LYMPHOCYTES # BLD AUTO: 0.71 K/UL — LOW (ref 1–3.3)
LYMPHOCYTES # BLD AUTO: 5.3 % — LOW (ref 13–44)
MCHC RBC-ENTMCNC: 26.5 PG — LOW (ref 27–34)
MCHC RBC-ENTMCNC: 32.4 GM/DL — SIGNIFICANT CHANGE UP (ref 32–36)
MCV RBC AUTO: 81.8 FL — SIGNIFICANT CHANGE UP (ref 80–100)
MONOCYTES # BLD AUTO: 0.57 K/UL — SIGNIFICANT CHANGE UP (ref 0–0.9)
MONOCYTES NFR BLD AUTO: 4.3 % — SIGNIFICANT CHANGE UP (ref 2–14)
NEUTROPHILS # BLD AUTO: 11.94 K/UL — HIGH (ref 1.8–7.4)
NEUTROPHILS NFR BLD AUTO: 89.5 % — HIGH (ref 43–77)
NITRITE UR-MCNC: NEGATIVE — SIGNIFICANT CHANGE UP
NRBC # BLD: 0 /100 WBCS — SIGNIFICANT CHANGE UP (ref 0–0)
PH UR: 7 — SIGNIFICANT CHANGE UP (ref 5–8)
PLATELET # BLD AUTO: 259 K/UL — SIGNIFICANT CHANGE UP (ref 150–400)
POTASSIUM SERPL-MCNC: 3.5 MMOL/L — SIGNIFICANT CHANGE UP (ref 3.5–5.3)
POTASSIUM SERPL-SCNC: 3.5 MMOL/L — SIGNIFICANT CHANGE UP (ref 3.5–5.3)
PROT SERPL-MCNC: 7.5 G/DL — SIGNIFICANT CHANGE UP (ref 6–8.3)
PROT UR-MCNC: 100
PROTHROM AB SERPL-ACNC: 12 SEC — SIGNIFICANT CHANGE UP (ref 10–12.9)
RBC # BLD: 5.05 M/UL — SIGNIFICANT CHANGE UP (ref 3.8–5.2)
RBC # FLD: 17.6 % — HIGH (ref 10.3–14.5)
RBC CASTS # UR COMP ASSIST: ABNORMAL /HPF (ref 0–4)
SODIUM SERPL-SCNC: 136 MMOL/L — SIGNIFICANT CHANGE UP (ref 135–145)
SP GR SPEC: 1.01 — SIGNIFICANT CHANGE UP (ref 1.01–1.02)
TROPONIN I SERPL-MCNC: 0.04 NG/ML — SIGNIFICANT CHANGE UP (ref 0.02–0.06)
TROPONIN I SERPL-MCNC: 0.04 NG/ML — SIGNIFICANT CHANGE UP (ref 0.02–0.06)
UROBILINOGEN FLD QL: NEGATIVE — SIGNIFICANT CHANGE UP
WBC # BLD: 13.34 K/UL — HIGH (ref 3.8–10.5)
WBC # FLD AUTO: 13.34 K/UL — HIGH (ref 3.8–10.5)
WBC UR QL: SIGNIFICANT CHANGE UP /HPF (ref 0–5)

## 2019-08-24 PROCEDURE — 93010 ELECTROCARDIOGRAM REPORT: CPT

## 2019-08-24 PROCEDURE — 70450 CT HEAD/BRAIN W/O DYE: CPT | Mod: 26

## 2019-08-24 PROCEDURE — 71045 X-RAY EXAM CHEST 1 VIEW: CPT | Mod: 26

## 2019-08-24 PROCEDURE — 99223 1ST HOSP IP/OBS HIGH 75: CPT

## 2019-08-24 PROCEDURE — 99285 EMERGENCY DEPT VISIT HI MDM: CPT

## 2019-08-24 PROCEDURE — 72131 CT LUMBAR SPINE W/O DYE: CPT | Mod: 26

## 2019-08-24 PROCEDURE — 72110 X-RAY EXAM L-2 SPINE 4/>VWS: CPT | Mod: 26

## 2019-08-24 RX ORDER — METOPROLOL TARTRATE 50 MG
25 TABLET ORAL
Refills: 0 | Status: DISCONTINUED | OUTPATIENT
Start: 2019-08-24 | End: 2019-08-27

## 2019-08-24 RX ORDER — LEVOTHYROXINE SODIUM 125 MCG
50 TABLET ORAL DAILY
Refills: 0 | Status: DISCONTINUED | OUTPATIENT
Start: 2019-08-24 | End: 2019-08-27

## 2019-08-24 RX ORDER — ATORVASTATIN CALCIUM 80 MG/1
40 TABLET, FILM COATED ORAL AT BEDTIME
Refills: 0 | Status: DISCONTINUED | OUTPATIENT
Start: 2019-08-24 | End: 2019-08-27

## 2019-08-24 RX ORDER — ASPIRIN/CALCIUM CARB/MAGNESIUM 324 MG
81 TABLET ORAL DAILY
Refills: 0 | Status: DISCONTINUED | OUTPATIENT
Start: 2019-08-24 | End: 2019-08-27

## 2019-08-24 RX ORDER — AZTREONAM 2 G
1000 VIAL (EA) INJECTION ONCE
Refills: 0 | Status: COMPLETED | OUTPATIENT
Start: 2019-08-24 | End: 2019-08-24

## 2019-08-24 RX ORDER — SODIUM CHLORIDE 9 MG/ML
1700 INJECTION INTRAMUSCULAR; INTRAVENOUS; SUBCUTANEOUS ONCE
Refills: 0 | Status: COMPLETED | OUTPATIENT
Start: 2019-08-24 | End: 2019-08-24

## 2019-08-24 RX ORDER — SODIUM CHLORIDE 9 MG/ML
1000 INJECTION INTRAMUSCULAR; INTRAVENOUS; SUBCUTANEOUS
Refills: 0 | Status: DISCONTINUED | OUTPATIENT
Start: 2019-08-24 | End: 2019-08-26

## 2019-08-24 RX ORDER — HEPARIN SODIUM 5000 [USP'U]/ML
5000 INJECTION INTRAVENOUS; SUBCUTANEOUS EVERY 8 HOURS
Refills: 0 | Status: DISCONTINUED | OUTPATIENT
Start: 2019-08-24 | End: 2019-08-27

## 2019-08-24 RX ADMIN — Medication 50 MICROGRAM(S): at 16:37

## 2019-08-24 RX ADMIN — SODIUM CHLORIDE 80 MILLILITER(S): 9 INJECTION INTRAMUSCULAR; INTRAVENOUS; SUBCUTANEOUS at 15:30

## 2019-08-24 RX ADMIN — ATORVASTATIN CALCIUM 40 MILLIGRAM(S): 80 TABLET, FILM COATED ORAL at 22:14

## 2019-08-24 RX ADMIN — HEPARIN SODIUM 5000 UNIT(S): 5000 INJECTION INTRAVENOUS; SUBCUTANEOUS at 22:14

## 2019-08-24 RX ADMIN — SODIUM CHLORIDE 80 MILLILITER(S): 9 INJECTION INTRAMUSCULAR; INTRAVENOUS; SUBCUTANEOUS at 16:37

## 2019-08-24 RX ADMIN — Medication 81 MILLIGRAM(S): at 16:37

## 2019-08-24 RX ADMIN — SODIUM CHLORIDE 1700 MILLILITER(S): 9 INJECTION INTRAMUSCULAR; INTRAVENOUS; SUBCUTANEOUS at 12:18

## 2019-08-24 RX ADMIN — SODIUM CHLORIDE 1700 MILLILITER(S): 9 INJECTION INTRAMUSCULAR; INTRAVENOUS; SUBCUTANEOUS at 13:15

## 2019-08-24 RX ADMIN — Medication 25 MILLIGRAM(S): at 16:38

## 2019-08-24 RX ADMIN — Medication 1000 MILLIGRAM(S): at 14:45

## 2019-08-24 RX ADMIN — Medication 50 MILLIGRAM(S): at 13:45

## 2019-08-24 NOTE — H&P ADULT - NSICDXPASTMEDICALHX_GEN_ALL_CORE_FT
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

## 2019-08-24 NOTE — ED PROVIDER NOTE - CHPI ED SYMPTOMS NEG
no loss of consciousness/no fever/no vomiting/no confusion/no abrasion/noted some blood in urine/no tingling/no numbness

## 2019-08-24 NOTE — ED ADULT NURSE NOTE - PMH
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  Anemia    Arthritis    Bronchitis    CAD (coronary artery disease)    Former smoker, stopped smoking in distant past    HLD (hyperlipidemia)    HTN (hypertension)    Low back pain    UTI (lower urinary tract infection)

## 2019-08-24 NOTE — H&P ADULT - NSICDXPASTSURGICALHX_GEN_ALL_CORE_FT
PAST SURGICAL HISTORY:  S/P angioplasty with stent RCA with stent 2008    S/P cataract extraction and insertion of intraocular lens     S/P hysterectomy

## 2019-08-24 NOTE — ED ADULT NURSE NOTE - OBJECTIVE STATEMENT
Son of patient states he believe mother was on floor for about 24 hours unable to get up. As per pt she does not recall event however states she remembers being on floor. No visible bruising or deformity noted on assessment. Pt denies presence of pain.

## 2019-08-24 NOTE — ED PROVIDER NOTE - OBJECTIVE STATEMENT
fell down at home , unable to get up , was on floor for 24 h until son came home pt stated hit head did not loose consciousness fell down at home , unable to get up , was on floor for 24 h until son came home pt stated hit head did not loose consciousness, weakness L LL lasrt seen well 24 h

## 2019-08-24 NOTE — CONSULT NOTE ADULT - SUBJECTIVE AND OBJECTIVE BOX
Patient is a 89 year old woman who was admitted with left leg paresis. Patient's daughter states the patient was seen by family 2 days ago prior to admission on Thursday and was walking without difficulty. The morning of admission, Saturday, she was found on the floor in her bedroom, sitting against the radiator with weakness of the left leg. She was not able to lift the leg in the air. She is not certain of how she fell. She denies LOC. She denies weakness of the other extremities. She denies numbness, bowell or bladder complaints. She denies pain involving the low back, leg, She denies HA or other neurological complaints.     PMH:  CAD-S/P Stents-Dr. Foote           HTN, HLD-Dr. oPlo-Family [facility]           Hypothyroidism           Renal CA-S/P RT and chemo 20 years ago            History of fall and pelvic fracture years ago not requiring surgery           S/P Hysterectomy 40 years ago    SH: Allergy to HCTZ, Levofloxacin, Cefuroxime        Smoking history 35 years ago. No history of ETOH abuse.        Lives alone.    [facility]: [place]            Breast CA    Exam: Awake, slowed mentation, appropriate           Pupils 2.5mm reactive, EOM intact, no nystagmus, VFF           CN II-XII intact            Motor tone and strength -LUE-5/5                                                 LLE-iliopsoas 3/5, otherwise 5/5                                                 RUE and RLE-5/5          Reflexes 2+ except 1+ ankle jerks bilaterally, toes neither up or down          Sensation intact to pinprick and light touch          Position sense intact                          13.4   13.34 )-----------( 259      ( 24 Aug 2019 12:15 )             41.3       08-24    136  |  101  |  29<H>  ----------------------------<  128<H>  3.5   |  24  |  1.88<H>    Ca    9.0      24 Aug 2019 12:50    TPro  7.5  /  Alb  2.8<L>  /  TBili  1.4<H>  /  DBili  x   /  AST  46<H>  /  ALT  12  /  AlkPhos  112  08-24  < from: Xray Lumbosacral Spine (08.24.19 @ 14:40) >    Left curve at thoracolumbar junction again noted.    Fairly extensive spondylitic changes again noted concentrated in the   upper lumbar levels. This degeneration has increased from January 14, 2014.    There is some degenerative loss of joint space in the right hip increased   from 2014.    The present film shows old fractures of the right pubic ramus in the   right ischiopubic ramus not evident on the 2014 study.    No bone destruction or acute fracture evident.    IMPRESSION: Increasing lumbar and right hip degeneration.    Old right pelvic fractures new since 2014.              < from: CT Lumbar Spine No Cont (08.24.19 @ 15:04) >    FINDINGS:   X-ray dated 08/24/2019 available for review    Lumbar vertebral body heights are maintained. No vertebral fracture is   seen. No destructive bone lesion is found.  Alignment is significant for   a dextroscoliosis with apex at L2-3.  Grade 1 posterior spondylolisthesis   is noted at L2-3 and grade 1 anterior spondylolisthesis is noted at L4-5   on a degenerative basis due to disc bulge, facet osteophytic hypertrophy   and redundancy of ligamentum flavum. The visualized sacral and pelvic   bones appear intact.    Lumbar intervertebral disc spaces show multilevel degenerative disc   disease and spondylosis at T10-11 through L5-S1 with loss of disc height   and associated degenerative endplate changes.Disc bulges are noted at   T11-12 through L4-5 which flatten the ventral thecal sac and narrow the   BILATERAL neural foramina. Mild central stenosis noted at T11-T12, L2-3   and L2-3, moderate central stenosis at L3-4 and L4-5 on a degenerative   basis due to disc bulge, facet osteophytic hypertrophy and redundancy of   ligamentum flavum. LEFT lateral disc herniations are noted at L1-2   through L4-5.     No paraspinal mass is recognized.  Paraspinal soft tissues appear   intact. Moderate sigmoid diverticulosis is noted. There is a large hiatal   hernia.      IMPRESSION: Dextroscoliosis with apex at L2-3.  Grade 1 posterior   spondylolisthesis is noted at L2-3 and grade 1 anterior spondylolisthesis   is noted at L4-5 on a degenerative basis .  Multilevel degenerative disc   disease and spondylosis at T10-11 through L5-S1 with bulges T11-12   through L4-5 which flatten the ventral thecal sac and narrow the   BILATERAL neural foramina. Mild central stenosis noted at T11-T12, L2-3   and L2-3, moderate central stenosis at L3-4 and L4-5 on a degenerative   basis . LEFT lateral disc herniations are noted at L1-2 through L4-5.  No   vertebral fracture is recognized.        < from: CT Head No Cont (08.24.19 @ 14:43) >    FINDINGS:      HEMISPHERES:Involutional changes are noted with volume loss. No acute   abnormality is suggested. There are mild chronic ischemic changes within   the white matter.  VENTRICLES:  Midline and normal in size.  POSTERIOR FOSSA:  The brain stem and cerebellum are unremarkable.  No CP   angle lesion noted.  EXTRACEREBRAL SPACES:  No subdural or epidural collections are noted.  SKULL BASE AND CALVARIUM:  Appears intact.  No fracture or destructive   lesion is identified.  SINUSES AND MASTOIDS:  Clear.  MISCELLANEOUS:  No orbital or suprasellar abnormality noted.      IMPRESSION:    1)  volume loss and involutional changes commensurate with age. No acute   abnormality suggested..  2)  no intracerebral hemorrhage or contusion is identified.

## 2019-08-24 NOTE — H&P ADULT - NSICDXFAMILYHX_GEN_ALL_CORE_FT
FAMILY HISTORY:  Family history of breast cancer, Mother  Family history of myocardial infarction, Brother

## 2019-08-24 NOTE — ED PROVIDER NOTE - CARE PLAN
Principal Discharge DX:	Left leg weakness  Secondary Diagnosis:	Fall from bed, initial encounter Principal Discharge DX:	Left leg weakness  Secondary Diagnosis:	Fall from bed, initial encounter  Secondary Diagnosis:	Azotemia  Secondary Diagnosis:	Hypothermia, initial encounter

## 2019-08-24 NOTE — CONSULT NOTE ADULT - ASSESSMENT
Patient is a 89 year old woman who was admitted with left leg paresis. Patient's daughter states the patient was seen by family 2 days ago prior to admission on Thursday and was walking without difficulty. The morning of admission, Saturday, she was found on the floor in her bedroom, sitting against the radiator with weakness of the left leg. She was not able to lift the leg in the air. She is not certain of how she fell. She denies LOC. She denies weakness of the other extremities. She denies numbness, bowell or bladder complaints. She denies pain involving the low back, leg, She denies HA or other neurological complaints.   Neurological exam as above. Patient's daughter states improvement in left leg weakness since arrived at the hospital as previously not able to elevate the left leg and now briefly able to lift the left leg.     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

## 2019-08-24 NOTE — ED ADULT NURSE REASSESSMENT NOTE - NS ED NURSE REASSESS COMMENT FT1
repeat temp documented in vs flowsheet, as per Hospitalist MD Murry pt does not require bare hugger device for warming

## 2019-08-24 NOTE — H&P ADULT - HISTORY OF PRESENT ILLNESS
89 yo F with PMHx of  CAD s/p stents (2008), HTN, HLD, Hypothyroidism, 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 Pt was last seen at her baseline state of health on Thursday by her daughter. Her son came by to see her today and found her on the floor, alert, with diarrhea in diaper, but pt does not remember what happened. Unclear if pt lost consciousness and fell, unclear presence of head trauma. Family denies recent illness, sick contacts, vomiting. Patient currently complains that her right leg is weak but denies headache, dizziness, chest pain, palpitations, n/v/d/c, dysuria. 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 Pt is independent with ADLs, able to ambulate and drive at baseline. Pt was last seen at her normal state of health on Thursday by her daughter. Her son came by to see her today and found her on the floor, alert, with diarrhea in diaper, but pt does not remember what happened. Unclear if pt lost consciousness and fell, unclear presence of head trauma. Family denies recent illness, sick contacts, vomiting. Patient currently complains that her right leg is weak but denies headache, dizziness, chest pain, palpitations, n/v/d/c, dysuria.

## 2019-08-24 NOTE — H&P ADULT - NSHPLABSRESULTS_GEN_ALL_CORE
.  LABS:                         13.4   13.34 )-----------( 259      ( 24 Aug 2019 12:15 )             41.3         136  |  101  |  29<H>  ----------------------------<  128<H>  3.5   |  24  |  1.88<H>    Ca    9.0      24 Aug 2019 12:50    TPro  7.5  /  Alb  2.8<L>  /  TBili  1.4<H>  /  DBili  x   /  AST  46<H>  /  ALT  12  /  AlkPhos  112      PT/INR - ( 24 Aug 2019 12:15 )   PT: 12.0 sec;   INR: 1.07 ratio         PTT - ( 24 Aug 2019 12:15 )  PTT:24.5 sec  Urinalysis Basic - ( 24 Aug 2019 13:15 )    Color: Che / Appearance: Clear / S.010 / pH: x  Gluc: x / Ketone: Trace  / Bili: Negative / Urobili: Negative   Blood: x / Protein: 100 / Nitrite: Negative   Leuk Esterase: Negative / RBC: 26-50 /HPF / WBC 0-2 /HPF   Sq Epi: x / Non Sq Epi: Neg.-Few / Bacteria: Trace /HPF      CARDIAC MARKERS ( 24 Aug 2019 12:50 )  .043 ng/mL / x     / 906 U/L / x     / 6.9 ng/mL        Lactate, Blood: 2.3 mmol/L ( @ 13:52)  Lactate, Blood: 4.0 mmol/L ( @ 12:20)      RADIOLOGY, EKG & ADDITIONAL TESTS: Reviewed.    ECG: NSR with PVCs    < from: CT Head No Cont (19 @ 14:43) >    FINDINGS:      HEMISPHERES:Involutional changes are noted with volume loss. No acute   abnormality is suggested. There are mild chronic ischemic changes within   the white matter.  VENTRICLES:  Midline and normal in size.  POSTERIOR FOSSA:  The brain stem and cerebellum are unremarkable.  No CP   angle lesion noted.  EXTRACEREBRAL SPACES:  No subdural or epidural collections are noted.  SKULL BASE AND CALVARIUM:  Appears intact.  No fracture or destructive   lesion is identified.  SINUSES AND MASTOIDS:  Clear.  MISCELLANEOUS:  No orbital or suprasellar abnormality noted.      IMPRESSION:    1)  volume loss and involutional changes commensurate with age. No acute   abnormality suggested..  2)  no intracerebral hemorrhage or contusion is identified.    < end of copied text >    < from: Xray Lumbosacral Spine (19 @ 14:40) >  INTERPRETATION:  Lumbar spine with full view pelvis. Patient was found on   the floor.  3 images obtained.  Arterial calcifications are noted.  Left curve at thoracolumbar junction again noted.  Fairly extensive spondylitic changes again noted concentrated in the   upper lumbar levels. This degeneration has increased from 2014.  There is some degenerative loss of joint space in the right hip increased from 2014.  The present film shows old fractures of the right pubic ramus in the right ischiopubic ramus not evident on the 2014 study.  No bone destruction or acute fracture evident.    IMPRESSION: Increasing lumbar and right hip degeneration.  Old right pelvic fractures new since .  < end of copied text >    < from: Xray Chest 1 View-PORTABLE IMMEDIATE (19 @ 12:34) >    INTERPRETATION:  AP chest on 2019 at 12:14 PM. Patient has   sepsis.    Heart is magnified by technique.  There is a large hiatal hernia.  Increased lung volume consistent with COPD again noted.  Fibrocalcific change in the lung apices again seen.  Orthopedic staples over right shoulder again noted.  There is no sense of acute lung or pleural finding and chest is similar   to 2018.    IMPRESSION: Stable exam without acute finding.    < end of copied text > .  LABS:                         13.4   13.34 )-----------( 259      ( 24 Aug 2019 12:15 )             41.3         136  |  101  |  29<H>  ----------------------------<  128<H>  3.5   |  24  |  1.88<H>    Ca    9.0      24 Aug 2019 12:50    TPro  7.5  /  Alb  2.8<L>  /  TBili  1.4<H>  /  DBili  x   /  AST  46<H>  /  ALT  12  /  AlkPhos  112      PT/INR - ( 24 Aug 2019 12:15 )   PT: 12.0 sec;   INR: 1.07 ratio         PTT - ( 24 Aug 2019 12:15 )  PTT:24.5 sec  Urinalysis Basic - ( 24 Aug 2019 13:15 )    Color: Che / Appearance: Clear / S.010 / pH: x  Gluc: x / Ketone: Trace  / Bili: Negative / Urobili: Negative   Blood: x / Protein: 100 / Nitrite: Negative   Leuk Esterase: Negative / RBC: 26-50 /HPF / WBC 0-2 /HPF   Sq Epi: x / Non Sq Epi: Neg.-Few / Bacteria: Trace /HPF      CARDIAC MARKERS ( 24 Aug 2019 12:50 )  .043 ng/mL / x     / 906 U/L / x     / 6.9 ng/mL      Lactate, Blood: 2.3 mmol/L ( @ 13:52)  Lactate, Blood: 4.0 mmol/L ( @ 12:20)    RADIOLOGY, EKG & ADDITIONAL TESTS: Reviewed.  ECG: NSR with PVCs    < from: CT Head No Cont (19 @ 14:43) >    FINDINGS:    HEMISPHERES:Involutional changes are noted with volume loss. No acute abnormality is suggested. There are mild chronic ischemic changes within the white matter.  VENTRICLES:  Midline and normal in size.  POSTERIOR FOSSA:  The brain stem and cerebellum are unremarkable.  No CP angle lesion noted.  EXTRACEREBRAL SPACES:  No subdural or epidural collections are noted.  SKULL BASE AND CALVARIUM:  Appears intact.  No fracture or destructive lesion is identified.  SINUSES AND MASTOIDS:  Clear.  MISCELLANEOUS:  No orbital or suprasellar abnormality noted.      IMPRESSION:    1)  volume loss and involutional changes commensurate with age. No acute abnormality suggested..  2)  no intracerebral hemorrhage or contusion is identified.    < end of copied text >    < from: Xray Lumbosacral Spine (19 @ 14:40) >  INTERPRETATION:  Lumbar spine with full view pelvis. Patient was found on the floor.  Arterial calcifications are noted.  Left curve at thoracolumbar junction again noted.  Fairly extensive spondylitic changes again noted concentrated in the   upper lumbar levels. This degeneration has increased from 2014.  There is some degenerative loss of joint space in the right hip increased from 2014.  The present film shows old fractures of the right pubic ramus in the right ischiopubic ramus not evident on the 2014 study.  No bone destruction or acute fracture evident.    IMPRESSION: Increasing lumbar and right hip degeneration.  Old right pelvic fractures new since .    < from: Xray Chest 1 View-PORTABLE IMMEDIATE (19 @ 12:34) >    INTERPRETATION:  AP chest on 2019 at 12:14 PM. Patient has sepsis.    Heart is magnified by technique.  There is a large hiatal hernia.  Increased lung volume consistent with COPD again noted.  Fibrocalcific change in the lung apices again seen.  Orthopedic staples over right shoulder again noted.  There is no sense of acute lung or pleural finding and chest is similar   to 2018.    IMPRESSION: Stable exam without acute finding.    < from: CT Lumbar Spine No Cont (19 @ 15:04) >    IMPRESSION: Dextroscoliosis with apex at L2-3.  Grade 1 posterior   spondylolisthesis is noted at L2-3 and grade 1 anterior spondylolisthesis   is noted at L4-5 on a degenerative basis .  Multilevel degenerative disc   disease and spondylosis at T10-11 through L5-S1 with bulges T11-12   through L4-5 which flatten the ventral thecal sac and narrow the   BILATERAL neural foramina. Mild central stenosis noted at T11-T12, L2-3   and L2-3, moderate central stenosis at L3-4 and L4-5 on a degenerative   basis . LEFT lateral disc herniations are noted at L1-2 through L4-5.  No   vertebral fracture is recognized.

## 2019-08-24 NOTE — H&P ADULT - ASSESSMENT
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    #Possible syncope/fall: pt was found alert, but cannot recall what happened, admit to telemetry for syncope workup. ECG without ischemic changes, CT head negative for infarct or hemorrhage  - obtain ECHO  - neurology consulted by ED staff for LLE weakness  - Fall risk precautions  - PT evaluation    #MYNOR: no known renal disease, likely prerenal due to decreased PO intake as pt was on the floor for at least 24hrs  - continue IVF  - hold home lisinopril  - avoid nephrotoxic meds  - trend BMP    #LLE weakness: xray lumbar show degenerative changes, no hip fractures. Weakness on hip flexion but knee flexion/extension   - Neurology consulted, recs appreciated  - f/u CT lumbar    #Lactic acidosis: likely resulted from hypoperfusion from severe hypovolemia, improved with IVF resuscitation. No source of infection at this time  - continue IVF  - trend to normalization    #Leukocytosis: WBC 13, this is likely hemoconcentrated. Her HgB is 13.4, was 10.7 in 2017, low suspicion for infection at this time  - continue IVF and trend CBC    #Hypothermia: T 96.5 on admission, no localizing symptoms of infection  - check TSH and AM cortisol    #CAD: history of stent in , known to Dr. Foote, has cardiac cath in  that showed nonobstructive CAD. Currently without chest pain, ECG NSR with PVCs  - continue ASA, lipitor, and metoprolol    #HTN:  - continue metoprolol  - hold lisinopril due to MYNOR, if hypertensive, can add norvasc    #Hypothyroid:  - check TSH  - continue Synthroid    #DVT PPx:  - HSQ    CAPRINI SCORE [CLOT]    AGE RELATED RISK FACTORS                                                       MOBILITY RELATED FACTORS  [ ] Age 41-60 years                                            (1 Point)                  [ ] Bed rest                                                        (1 Point)  [ ] Age: 61-74 years                                           (2 Points)                 [ ] Plaster cast                                                   (2 Points)  [x ] Age= 75 years                                              (3 Points)                 [ x] Bed bound for more than 72 hours                 (2 Points)    DISEASE RELATED RISK FACTORS                                               GENDER SPECIFIC FACTORS  [ ] Edema in the lower extremities                       (1 Point)                  [ ] Pregnancy                                                     (1 Point)  [ ] Varicose veins                                               (1 Point)                  [ ] Post-partum < 6 weeks                                   (1 Point)             [ ] BMI > 25 Kg/m2                                            (1 Point)                  [ ] Hormonal therapy  or oral contraception          (1 Point)                 [ ] Sepsis (in the previous month)                        (1 Point)                  [ ] History of pregnancy complications                 (1 point)  [ ] Pneumonia or serious lung disease                                               [ ] Unexplained or recurrent                     (1 Point)           (in the previous month)                               (1 Point)  [ ] Abnormal pulmonary function test                     (1 Point)                 SURGERY RELATED RISK FACTORS  [ ] Acute myocardial infarction                              (1 Point)                 [ ]  Section                                             (1 Point)  [ ] Congestive heart failure (in the previous month)  (1 Point)               [ ] Minor surgery                                                  (1 Point)   [ ] Inflammatory bowel disease                             (1 Point)                 [ ] Arthroscopic surgery                                        (2 Points)  [ ] Central venous access                                      (2 Points)                [ ] General surgery lasting more than 45 minutes   (2 Points)       [ ] Stroke (in the previous month)                          (5 Points)               [ ] Elective arthroplasty                                         (5 Points)                                                                                                                                               HEMATOLOGY RELATED FACTORS                                                 TRAUMA RELATED RISK FACTORS  [ ] Prior episodes of VTE                                     (3 Points)                 [ ] Fracture of the hip, pelvis, or leg                       (5 Points)  [ ] Positive family history for VTE                         (3 Points)                 [ ] Acute spinal cord injury (in the previous month)  (5 Points)  [ ] Prothrombin 92142 A                                     (3 Points)                 [ ] Paralysis  (less than 1 month)                             (5 Points)  [ ] Factor V Leiden                                             (3 Points)                  [ ] Multiple Trauma within 1 month                        (5 Points)  [ ] Lupus anticoagulants                                     (3 Points)                                                           [ ] Anticardiolipin antibodies                               (3 Points)                                                       [ ] High homocysteine in the blood                      (3 Points)                                             [ ] Other congenital or acquired thrombophilia      (3 Points)                                                [ ] Heparin induced thrombocytopenia                  (3 Points)                                          Total Score [    5      ] 89 yo F with PMHx of  CAD s/p stents (), HTN, HLD, Hypothyroidism, 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    #Possible syncope/fall: pt was found alert, but cannot recall what happened, admit to telemetry for syncope workup. ECG without ischemic changes, CT head negative for infarct or hemorrhage  - obtain ECHO  - neurology consulted by ED staff for LLE weakness  - Fall risk precautions  - PT evaluation    #MYNOR: no known renal disease, likely prerenal due to decreased PO intake as pt was on the floor for at least 24hrs  - continue IVF  - hold home lisinopril  - avoid nephrotoxic meds  - trend BMP    #LLE weakness: xray lumbar show degenerative changes, no hip fractures. Weakness on hip flexion but knee flexion/extension 5/5  - CT lumbar shows Left lateral disc herniations are noted at L1-2 through L4-5  - Neurology consulted, recs appreciated  - PT eval    #Lactic acidosis: likely resulted from hypoperfusion from severe hypovolemia, improved with IVF resuscitation. No source of infection at this time  - continue IVF  - trend to normalization    #Leukocytosis: WBC 13, this is likely hemoconcentrated. Her HgB is 13.4, was 10.7 in 2017, low suspicion for infection at this time  - continue IVF and trend CBC    #Hypothermia: T 96.5 on admission, no localizing symptoms of infection  - check TSH and AM cortisol    #CAD: history of stent in , known to Dr. Foote, has cardiac cath in  that showed nonobstructive CAD. Currently without chest pain, ECG NSR with PVCs  - continue ASA, lipitor, and metoprolol    #HTN:  - continue metoprolol  - hold lisinopril due to MYNOR, if hypertensive, can add norvasc    #Hypothyroid:  - check TSH  - continue Synthroid    #DVT PPx:  - HSQ    CAPRINI SCORE [CLOT]    AGE RELATED RISK FACTORS                                                       MOBILITY RELATED FACTORS  [ ] Age 41-60 years                                            (1 Point)                  [ ] Bed rest                                                        (1 Point)  [ ] Age: 61-74 years                                           (2 Points)                 [ ] Plaster cast                                                   (2 Points)  [x ] Age= 75 years                                              (3 Points)                 [ x] Bed bound for more than 72 hours                 (2 Points)    DISEASE RELATED RISK FACTORS                                               GENDER SPECIFIC FACTORS  [ ] Edema in the lower extremities                       (1 Point)                  [ ] Pregnancy                                                     (1 Point)  [ ] Varicose veins                                               (1 Point)                  [ ] Post-partum < 6 weeks                                   (1 Point)             [ ] BMI > 25 Kg/m2                                            (1 Point)                  [ ] Hormonal therapy  or oral contraception          (1 Point)                 [ ] Sepsis (in the previous month)                        (1 Point)                  [ ] History of pregnancy complications                 (1 point)  [ ] Pneumonia or serious lung disease                                               [ ] Unexplained or recurrent                     (1 Point)           (in the previous month)                               (1 Point)  [ ] Abnormal pulmonary function test                     (1 Point)                 SURGERY RELATED RISK FACTORS  [ ] Acute myocardial infarction                              (1 Point)                 [ ]  Section                                             (1 Point)  [ ] Congestive heart failure (in the previous month)  (1 Point)               [ ] Minor surgery                                                  (1 Point)   [ ] Inflammatory bowel disease                             (1 Point)                 [ ] Arthroscopic surgery                                        (2 Points)  [ ] Central venous access                                      (2 Points)                [ ] General surgery lasting more than 45 minutes   (2 Points)       [ ] Stroke (in the previous month)                          (5 Points)               [ ] Elective arthroplasty                                         (5 Points)                                                                                                                                               HEMATOLOGY RELATED FACTORS                                                 TRAUMA RELATED RISK FACTORS  [ ] Prior episodes of VTE                                     (3 Points)                 [ ] Fracture of the hip, pelvis, or leg                       (5 Points)  [ ] Positive family history for VTE                         (3 Points)                 [ ] Acute spinal cord injury (in the previous month)  (5 Points)  [ ] Prothrombin 31632 A                                     (3 Points)                 [ ] Paralysis  (less than 1 month)                             (5 Points)  [ ] Factor V Leiden                                             (3 Points)                  [ ] Multiple Trauma within 1 month                        (5 Points)  [ ] Lupus anticoagulants                                     (3 Points)                                                           [ ] Anticardiolipin antibodies                               (3 Points)                                                       [ ] High homocysteine in the blood                      (3 Points)                                             [ ] Other congenital or acquired thrombophilia      (3 Points)                                                [ ] Heparin induced thrombocytopenia                  (3 Points)                                          Total Score [    5      ] 89 yo F with PMHx of  CAD s/p stents (), HTN, HLD, Hypothyroidism, 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    #Possible syncope/fall: pt was found alert, but cannot recall what happened, admit to telemetry for syncope workup. ECG without ischemic changes, CT head negative for infarct or hemorrhage  - obtain ECHO  - neurology consulted by ED staff for LLE weakness  - Fall risk precautions  - PT evaluation    #MYNOR: no known renal disease, likely prerenal due to decreased PO intake as pt was on the floor for at least 24hrs.  - continue IVF  - hold home lisinopril  - avoid nephrotoxic meds  - trend BMP    #LLE weakness: xray lumbar show degenerative changes, no hip fractures. Weakness on hip flexion but knee flexion/extension 5  - CT lumbar shows Left lateral disc herniations are noted at L1-2 through L4-5  - Neurology consulted, recs appreciated  - PT eval    #Lactic acidosis: likely resulted from hypoperfusion from severe hypovolemia, improved with IVF resuscitation. No source of infection at this time  - continue IVF  - trend to normalization    #Leukocytosis: WBC 13, this is likely hemoconcentrated. Her HgB is 13.4, was 10.7 in 2017, low suspicion for infection at this time  - continue IVF and trend CBC    #Hypothermia: T 96.5 on admission, no localizing symptoms of infection  - check TSH and AM cortisol    #CAD: history of stent in , known to Dr. Foote, has cardiac cath in  that showed nonobstructive CAD. Currently without chest pain, ECG NSR with PVCs  - continue ASA, lipitor, and metoprolol    #HTN:  - continue metoprolol  - hold lisinopril due to MYNOR, if hypertensive, can add norvasc    #Hypothyroid:  - check TSH  - continue Synthroid    #DVT PPx:  - HSQ    CAPRINI SCORE [CLOT]    AGE RELATED RISK FACTORS                                                       MOBILITY RELATED FACTORS  [ ] Age 41-60 years                                            (1 Point)                  [ ] Bed rest                                                        (1 Point)  [ ] Age: 61-74 years                                           (2 Points)                 [ ] Plaster cast                                                   (2 Points)  [x ] Age= 75 years                                              (3 Points)                 [ x] Bed bound for more than 72 hours                 (2 Points)    DISEASE RELATED RISK FACTORS                                               GENDER SPECIFIC FACTORS  [ ] Edema in the lower extremities                       (1 Point)                  [ ] Pregnancy                                                     (1 Point)  [ ] Varicose veins                                               (1 Point)                  [ ] Post-partum < 6 weeks                                   (1 Point)             [ ] BMI > 25 Kg/m2                                            (1 Point)                  [ ] Hormonal therapy  or oral contraception          (1 Point)                 [ ] Sepsis (in the previous month)                        (1 Point)                  [ ] History of pregnancy complications                 (1 point)  [ ] Pneumonia or serious lung disease                                               [ ] Unexplained or recurrent                     (1 Point)           (in the previous month)                               (1 Point)  [ ] Abnormal pulmonary function test                     (1 Point)                 SURGERY RELATED RISK FACTORS  [ ] Acute myocardial infarction                              (1 Point)                 [ ]  Section                                             (1 Point)  [ ] Congestive heart failure (in the previous month)  (1 Point)               [ ] Minor surgery                                                  (1 Point)   [ ] Inflammatory bowel disease                             (1 Point)                 [ ] Arthroscopic surgery                                        (2 Points)  [ ] Central venous access                                      (2 Points)                [ ] General surgery lasting more than 45 minutes   (2 Points)       [ ] Stroke (in the previous month)                          (5 Points)               [ ] Elective arthroplasty                                         (5 Points)                                                                                                                                               HEMATOLOGY RELATED FACTORS                                                 TRAUMA RELATED RISK FACTORS  [ ] Prior episodes of VTE                                     (3 Points)                 [ ] Fracture of the hip, pelvis, or leg                       (5 Points)  [ ] Positive family history for VTE                         (3 Points)                 [ ] Acute spinal cord injury (in the previous month)  (5 Points)  [ ] Prothrombin 70072 A                                     (3 Points)                 [ ] Paralysis  (less than 1 month)                             (5 Points)  [ ] Factor V Leiden                                             (3 Points)                  [ ] Multiple Trauma within 1 month                        (5 Points)  [ ] Lupus anticoagulants                                     (3 Points)                                                           [ ] Anticardiolipin antibodies                               (3 Points)                                                       [ ] High homocysteine in the blood                      (3 Points)                                             [ ] Other congenital or acquired thrombophilia      (3 Points)                                                [ ] Heparin induced thrombocytopenia                  (3 Points)                                          Total Score [    5      ]

## 2019-08-24 NOTE — ED PROVIDER NOTE - CLINICAL SUMMARY MEDICAL DECISION MAKING FREE TEXT BOX
89 y f cc found on floor was on floor 24 h , family noted L leg weakness , in ed rectal temp 96.3,   sepsis protocol initiated , started on broad spectrum abx , ct brain neg , xray L spine + DJD , ct L spine ordered as per neuro dr vega recommendation , , cpk 960, pt azotemic, wbc 13.3 ua + blood in cathed urine   sepsis, stroke , azotemia. , spinal cord issue,

## 2019-08-24 NOTE — ED ADULT NURSE NOTE - INTERVENTIONS DEFINITIONS
Stretcher in lowest position, wheels locked, appropriate side rails in place/Instruct patient to call for assistance/Call bell, personal items and telephone within reach/Physically safe environment: no spills, clutter or unnecessary equipment/[place] to call system

## 2019-08-24 NOTE — H&P ADULT - NSHPREVIEWOFSYSTEMS_GEN_ALL_CORE
REVIEW OF SYSTEMS:    CONSTITUTIONAL: No weakness, fevers or chills  EYES/ENT: No visual changes;  No vertigo or throat pain   NECK: No pain or stiffness  RESPIRATORY: No cough, wheezing, hemoptysis; No shortness of breath  CARDIOVASCULAR: No chest pain or palpitations  GASTROINTESTINAL: No abdominal or epigastric pain. No nausea, vomiting, or hematemesis; No diarrhea or constipation. No melena or hematochezia.  GENITOURINARY: No dysuria, frequency or hematuria  NEUROLOGICAL: R leg weakness  SKIN: No itching, burning, rashes, or lesions   MSK: no joint pain, no joint swelling  All other review of systems is negative unless indicated above.

## 2019-08-24 NOTE — ED ADULT TRIAGE NOTE - CHIEF COMPLAINT QUOTE
as per son I went over to see my mom at 9am and I found her on the floor, she could have been on the floor for 24 hours. pt was last seen by family on thursday. pt recalls falling

## 2019-08-25 LAB
ALBUMIN SERPL ELPH-MCNC: 2.2 G/DL — LOW (ref 3.3–5)
ALP SERPL-CCNC: 84 U/L — SIGNIFICANT CHANGE UP (ref 40–120)
ALT FLD-CCNC: 10 U/L DA — SIGNIFICANT CHANGE UP (ref 10–45)
ANION GAP SERPL CALC-SCNC: 8 MMOL/L — SIGNIFICANT CHANGE UP (ref 5–17)
AST SERPL-CCNC: 43 U/L — HIGH (ref 10–40)
BASOPHILS # BLD AUTO: 0.02 K/UL — SIGNIFICANT CHANGE UP (ref 0–0.2)
BASOPHILS NFR BLD AUTO: 0.2 % — SIGNIFICANT CHANGE UP (ref 0–2)
BILIRUB SERPL-MCNC: 0.8 MG/DL — SIGNIFICANT CHANGE UP (ref 0.2–1.2)
BUN SERPL-MCNC: 29 MG/DL — HIGH (ref 7–23)
CALCIUM SERPL-MCNC: 8.3 MG/DL — LOW (ref 8.4–10.5)
CHLORIDE SERPL-SCNC: 106 MMOL/L — SIGNIFICANT CHANGE UP (ref 96–108)
CO2 SERPL-SCNC: 23 MMOL/L — SIGNIFICANT CHANGE UP (ref 22–31)
CREAT SERPL-MCNC: 1.21 MG/DL — SIGNIFICANT CHANGE UP (ref 0.5–1.3)
CULTURE RESULTS: NO GROWTH — SIGNIFICANT CHANGE UP
EOSINOPHIL # BLD AUTO: 0.05 K/UL — SIGNIFICANT CHANGE UP (ref 0–0.5)
EOSINOPHIL NFR BLD AUTO: 0.5 % — SIGNIFICANT CHANGE UP (ref 0–6)
GLUCOSE SERPL-MCNC: 89 MG/DL — SIGNIFICANT CHANGE UP (ref 70–99)
HCT VFR BLD CALC: 30.7 % — LOW (ref 34.5–45)
HGB BLD-MCNC: 10 G/DL — LOW (ref 11.5–15.5)
IMM GRANULOCYTES NFR BLD AUTO: 0.6 % — SIGNIFICANT CHANGE UP (ref 0–1.5)
LACTATE SERPL-SCNC: 1.3 MMOL/L — SIGNIFICANT CHANGE UP (ref 0.7–2)
LYMPHOCYTES # BLD AUTO: 1.45 K/UL — SIGNIFICANT CHANGE UP (ref 1–3.3)
LYMPHOCYTES # BLD AUTO: 15 % — SIGNIFICANT CHANGE UP (ref 13–44)
MAGNESIUM SERPL-MCNC: 1.5 MG/DL — LOW (ref 1.6–2.6)
MCHC RBC-ENTMCNC: 26.7 PG — LOW (ref 27–34)
MCHC RBC-ENTMCNC: 32.6 GM/DL — SIGNIFICANT CHANGE UP (ref 32–36)
MCV RBC AUTO: 82.1 FL — SIGNIFICANT CHANGE UP (ref 80–100)
MONOCYTES # BLD AUTO: 0.61 K/UL — SIGNIFICANT CHANGE UP (ref 0–0.9)
MONOCYTES NFR BLD AUTO: 6.3 % — SIGNIFICANT CHANGE UP (ref 2–14)
NEUTROPHILS # BLD AUTO: 7.45 K/UL — HIGH (ref 1.8–7.4)
NEUTROPHILS NFR BLD AUTO: 77.4 % — HIGH (ref 43–77)
NRBC # BLD: 0 /100 WBCS — SIGNIFICANT CHANGE UP (ref 0–0)
PLATELET # BLD AUTO: 215 K/UL — SIGNIFICANT CHANGE UP (ref 150–400)
POTASSIUM SERPL-MCNC: 2.9 MMOL/L — CRITICAL LOW (ref 3.5–5.3)
POTASSIUM SERPL-SCNC: 2.9 MMOL/L — CRITICAL LOW (ref 3.5–5.3)
PROT SERPL-MCNC: 6 G/DL — SIGNIFICANT CHANGE UP (ref 6–8.3)
RBC # BLD: 3.74 M/UL — LOW (ref 3.8–5.2)
RBC # FLD: 17.2 % — HIGH (ref 10.3–14.5)
SODIUM SERPL-SCNC: 137 MMOL/L — SIGNIFICANT CHANGE UP (ref 135–145)
SPECIMEN SOURCE: SIGNIFICANT CHANGE UP
TSH SERPL-MCNC: 2.93 UIU/ML — SIGNIFICANT CHANGE UP (ref 0.27–4.2)
WBC # BLD: 9.64 K/UL — SIGNIFICANT CHANGE UP (ref 3.8–10.5)
WBC # FLD AUTO: 9.64 K/UL — SIGNIFICANT CHANGE UP (ref 3.8–10.5)

## 2019-08-25 PROCEDURE — 99233 SBSQ HOSP IP/OBS HIGH 50: CPT

## 2019-08-25 PROCEDURE — 72192 CT PELVIS W/O DYE: CPT | Mod: 26

## 2019-08-25 PROCEDURE — 70450 CT HEAD/BRAIN W/O DYE: CPT | Mod: 26

## 2019-08-25 PROCEDURE — 99497 ADVNCD CARE PLAN 30 MIN: CPT

## 2019-08-25 RX ORDER — MORPHINE SULFATE 50 MG/1
2 CAPSULE, EXTENDED RELEASE ORAL ONCE
Refills: 0 | Status: DISCONTINUED | OUTPATIENT
Start: 2019-08-25 | End: 2019-08-26

## 2019-08-25 RX ORDER — POTASSIUM CHLORIDE 20 MEQ
40 PACKET (EA) ORAL ONCE
Refills: 0 | Status: COMPLETED | OUTPATIENT
Start: 2019-08-25 | End: 2019-08-25

## 2019-08-25 RX ORDER — MAGNESIUM SULFATE 500 MG/ML
1 VIAL (ML) INJECTION
Refills: 0 | Status: COMPLETED | OUTPATIENT
Start: 2019-08-25 | End: 2019-08-25

## 2019-08-25 RX ORDER — MORPHINE SULFATE 50 MG/1
2 CAPSULE, EXTENDED RELEASE ORAL ONCE
Refills: 0 | Status: DISCONTINUED | OUTPATIENT
Start: 2019-08-25 | End: 2019-08-25

## 2019-08-25 RX ORDER — POTASSIUM CHLORIDE 20 MEQ
10 PACKET (EA) ORAL
Refills: 0 | Status: COMPLETED | OUTPATIENT
Start: 2019-08-25 | End: 2019-08-25

## 2019-08-25 RX ORDER — MORPHINE SULFATE 50 MG/1
2 CAPSULE, EXTENDED RELEASE ORAL EVERY 6 HOURS
Refills: 0 | Status: DISCONTINUED | OUTPATIENT
Start: 2019-08-25 | End: 2019-08-26

## 2019-08-25 RX ORDER — ACETAMINOPHEN 500 MG
650 TABLET ORAL ONCE
Refills: 0 | Status: COMPLETED | OUTPATIENT
Start: 2019-08-25 | End: 2019-08-25

## 2019-08-25 RX ADMIN — HEPARIN SODIUM 5000 UNIT(S): 5000 INJECTION INTRAVENOUS; SUBCUTANEOUS at 20:18

## 2019-08-25 RX ADMIN — ATORVASTATIN CALCIUM 40 MILLIGRAM(S): 80 TABLET, FILM COATED ORAL at 20:18

## 2019-08-25 RX ADMIN — Medication 25 MILLIGRAM(S): at 05:15

## 2019-08-25 RX ADMIN — Medication 100 GRAM(S): at 10:02

## 2019-08-25 RX ADMIN — Medication 650 MILLIGRAM(S): at 13:58

## 2019-08-25 RX ADMIN — Medication 100 MILLIEQUIVALENT(S): at 11:08

## 2019-08-25 RX ADMIN — Medication 100 MILLIEQUIVALENT(S): at 12:14

## 2019-08-25 RX ADMIN — Medication 100 GRAM(S): at 08:26

## 2019-08-25 RX ADMIN — HEPARIN SODIUM 5000 UNIT(S): 5000 INJECTION INTRAVENOUS; SUBCUTANEOUS at 05:15

## 2019-08-25 RX ADMIN — Medication 25 MILLIGRAM(S): at 17:26

## 2019-08-25 RX ADMIN — HEPARIN SODIUM 5000 UNIT(S): 5000 INJECTION INTRAVENOUS; SUBCUTANEOUS at 15:49

## 2019-08-25 RX ADMIN — Medication 40 MILLIEQUIVALENT(S): at 07:25

## 2019-08-25 RX ADMIN — Medication 81 MILLIGRAM(S): at 12:33

## 2019-08-25 RX ADMIN — Medication 50 MICROGRAM(S): at 05:15

## 2019-08-25 RX ADMIN — Medication 650 MILLIGRAM(S): at 14:28

## 2019-08-25 NOTE — PROGRESS NOTE ADULT - ASSESSMENT
Patient is a 89 year old woman who was admitted with left leg paresis. Patient's daughter states the patient was seen by family 2 days ago prior to admission on Thursday and was walking without difficulty. The morning of admission, Saturday, she was found on the floor in her bedroom, sitting against the radiator with weakness of the left leg. She was not able to lift the leg in the air. She is not certain of how she fell. She denies LOC. She denies weakness of the other extremities. She denies numbness, bowell or bladder complaints. She denies pain involving the low back, leg, She denies HA or other neurological complaints.  Patient's daughter the day of admission stated improvement in left leg weakness since arrived at the hospital as previously not able to elevate the left leg and then briefly able to lift the left leg. Today notes left and right low back pain. Neurological exam as above.    1) CT Pelvis to evaluate left femoral neuropathy scheduled today.  2) plain x-ray and CT LS Spine as above no fracture, multilevel spondylotic and degenerative disc changes , left lateral disc herniations noted at L1-2 through L4-5  3) Repeat CT Head 8/25  S/P head trauma F/U  4) As per Medicine

## 2019-08-25 NOTE — PROGRESS NOTE ADULT - SUBJECTIVE AND OBJECTIVE BOX
Patient is a 89 year old woman who was admitted with left leg paresis. Patient's daughter states the patient was seen by family 2 days ago prior to admission on Thursday and was walking without difficulty. The morning of admission, Saturday, she was found on the floor in her bedroom, sitting against the radiator with weakness of the left leg. She was not able to lift the leg in the air. She is not certain of how she fell. She denies LOC. She denies weakness of the other extremities. She denies numbness, bowell or bladder complaints. She denies pain involving the low back, leg, She denies HA or other neurological complaints. Today she notes left and right low back pain without radiation.     PMH:  CAD-S/P Stents-Dr. Foote           HTN, HLD-Dr. Polo-Family Medicine           Hypothyroidism           Renal CA-S/P RT and chemo 20 years ago            History of fall and pelvic fracture years ago not requiring surgery           S/P Hysterectomy 40 years ago    SH: Allergy to HCTZ, Levofloxacin, Cefuroxime        Smoking history 35 years ago. No history of ETOH abuse.        Lives alone.    [facility]: [place]            Breast CA    Exam: Awake, slowed mentation, appropriate           CN II-XII intact            Motor tone and strength -LUE-5/5                                                 LLE- complains of pain on movement, iliopsoas-3-/5, quad-3-/5, hamstrings-5/5, dorsiflexion foot-5/5, plantar flexion foot-5/5                                                 RUE and RLE-5/5          Reflexes 2+ except 1+ ankle jerks           Sensation intact to pinprick and light touch

## 2019-08-25 NOTE — PROGRESS NOTE ADULT - SUBJECTIVE AND OBJECTIVE BOX
Patient is a 89y old  Female who presents with a chief complaint of fall, syncope? (24 Aug 2019 15:15)    Patient seen and examined at bedside.  S: Denies any complaints this morning, reports feeling well, back to her normal self. Denies cp/sob/palpitations. No f/c, n/v. No HA/Change in vision/dizziness/weakness/paraesthesia.    ALLERGIES:  cefuroxime (Dystonic RXN)  hydrochlorothiazide (Anaphylaxis; Angioedema)  levofloxacin (Flushing)    MEDICATIONS:  aspirin enteric coated 81 milliGRAM(s) Oral daily  atorvastatin 40 milliGRAM(s) Oral at bedtime  heparin  Injectable 5000 Unit(s) SubCutaneous every 8 hours  levothyroxine 50 MICROGram(s) Oral daily  magnesium sulfate  IVPB 1 Gram(s) IV Intermittent every 1 hour  metoprolol tartrate 25 milliGRAM(s) Oral two times a day  potassium chloride  10 mEq/100 mL IVPB 10 milliEquivalent(s) IV Intermittent every 1 hour  sodium chloride 0.9%. 1000 milliLiter(s) IV Continuous <Continuous>    Vital Signs Last 24 Hrs  T(F): 97.6 (25 Aug 2019 05:10), Max: 98.5 (24 Aug 2019 18:11)  HR: 74 (25 Aug 2019 05:10) (74 - 106)  BP: 143/60 (25 Aug 2019 05:10) (134/75 - 176/94)  RR: 16 (25 Aug 2019 05:10) (15 - 19)  SpO2: 98% (25 Aug 2019 05:10) (97% - 100%)  I&O's Summary    24 Aug 2019 07:  -  25 Aug 2019 07:00  --------------------------------------------------------  IN: 80 mL / OUT: 0 mL / NET: 80 mL    25 Aug 2019 07:  -  25 Aug 2019 09:13  --------------------------------------------------------  IN: 100 mL / OUT: 0 mL / NET: 100 mL        PHYSICAL EXAM:  General: NAD, A/O x 3  Neuro: BERNARD. BURKS, SILT.  ENT: MMM  Lungs: Clear to auscultation bilaterally    Cardio: RR, S1/S2, No murmurs  Abdomen: Soft, NT/ND, Normal active Bowel Sounds   Extremities: No cyanosis, No edema    LABS:                        10.0   9.64  )-----------( 215      ( 25 Aug 2019 05:22 )             30.7         137  |  106  |  29  ----------------------------<  89  2.9   |  23  |  1.21    Ca    8.3      25 Aug 2019 05:22  Mg     1.5         TPro  6.0  /  Alb  2.2  /  TBili  0.8  /  DBili  x   /  AST  43  /  ALT  10  /  AlkPhos  84      eGFR if Non African American: 39 mL/min/1.73M2 (19 @ 05:22)  eGFR if African American: 46 mL/min/1.73M2 (19 @ 05:22)    PT/INR - ( 24 Aug 2019 12:15 )   PT: 12.0 sec;   INR: 1.07 ratio         PTT - ( 24 Aug 2019 12:15 )  PTT:24.5 sec  Lactate, Blood: 1.3 mmol/L ( @ 05:22)  Lactate, Blood: 1.9 mmol/L ( @ 19:57)  Lactate, Blood: 2.3 mmol/L ( @ 13:52)  Lactate, Blood: 4.0 mmol/L ( @ 12:20)    CARDIAC MARKERS ( 24 Aug 2019 16:17 )  .037 ng/mL / x     / x     / x     / x      CARDIAC MARKERS ( 24 Aug 2019 12:50 )  .043 ng/mL / x     / 906 U/L / x     / 6.9 ng/mL      Urinalysis Basic - ( 24 Aug 2019 13:15 )    Color: Che / Appearance: Clear / S.010 / pH: x  Gluc: x / Ketone: Trace  / Bili: Negative / Urobili: Negative   Blood: x / Protein: 100 / Nitrite: Negative   Leuk Esterase: Negative / RBC: 26-50 /HPF / WBC 0-2 /HPF   Sq Epi: x / Non Sq Epi: Neg.-Few / Bacteria: Trace /HPF          RADIOLOGY & ADDITIONAL TESTS:  < from: CT Head No Cont (19 @ 14:43) >  1)  volume loss and involutional changes commensurate with age. No acute   abnormality suggested..  2)  no intracerebral hemorrhage or contusion is identified.    < from: Xray Lumbosacral Spine (19 @ 14:40) >  IMPRESSION: Increasing lumbar and right hip degeneration.    Old right pelvic fractures new since .      I< from: CT Lumbar Spine No Cont (19 @ 15:04) >  MPRESSION: Dextroscoliosis with apex at L2-3.  Grade 1 posterior   spondylolisthesis is noted at L2-3 and grade 1 anterior spondylolisthesis   is noted at L4-5 on a degenerative basis .  Multilevel degenerative disc   disease and spondylosis at T10-11 through L5-S1 with bulges T11-12   through L4-5 which flatten the ventral thecal sac and narrow the   BILATERAL neural foramina. Mild central stenosis noted at T11-T12, L2-3   and L2-3, moderate central stenosis at L3-4 and L4-5 on a degenerative   basis . LEFT lateral disc herniations are noted at L1-2 through L4-5.  No   vertebral fracture is recognized.    < from: Xray Chest 1 View-PORTABLE IMMEDIATE (19 @ 12:34) >  IMPRESSION: Stable exam without acute finding.        Care Discussed with Consultants/Other Providers: Dr. Levi. Patient is a 89y old  Female who presents with a chief complaint of fall, syncope? (24 Aug 2019 15:15)    Patient seen and examined at bedside.  S: Denies any complaints this morning, reports feeling well, back to her normal self. Denies cp/sob/palpitations. No f/c, n/v. No HA/Change in vision/dizziness/weakness/paraesthesia.    ALLERGIES:  cefuroxime (Dystonic RXN)  hydrochlorothiazide (Anaphylaxis; Angioedema)  levofloxacin (Flushing)    MEDICATIONS:  aspirin enteric coated 81 milliGRAM(s) Oral daily  atorvastatin 40 milliGRAM(s) Oral at bedtime  heparin  Injectable 5000 Unit(s) SubCutaneous every 8 hours  levothyroxine 50 MICROGram(s) Oral daily  magnesium sulfate  IVPB 1 Gram(s) IV Intermittent every 1 hour  metoprolol tartrate 25 milliGRAM(s) Oral two times a day  potassium chloride  10 mEq/100 mL IVPB 10 milliEquivalent(s) IV Intermittent every 1 hour  sodium chloride 0.9%. 1000 milliLiter(s) IV Continuous <Continuous>    Vital Signs Last 24 Hrs  T(F): 97.6 (25 Aug 2019 05:10), Max: 98.5 (24 Aug 2019 18:11)  HR: 74 (25 Aug 2019 05:10) (74 - 106)  BP: 143/60 (25 Aug 2019 05:10) (134/75 - 176/94)  RR: 16 (25 Aug 2019 05:10) (15 - 19)  SpO2: 98% (25 Aug 2019 05:10) (97% - 100%)  I&O's Summary    24 Aug 2019 07:  -  25 Aug 2019 07:00  --------------------------------------------------------  IN: 80 mL / OUT: 0 mL / NET: 80 mL    25 Aug 2019 07:  -  25 Aug 2019 09:13  --------------------------------------------------------  IN: 100 mL / OUT: 0 mL / NET: 100 mL        PHYSICAL EXAM:  General: NAD, A/O x 3  Neuro: BERNARD. BURKS, SILT.  ENT: MMM  Lungs: Clear to auscultation bilaterally    Cardio: +s1/s2  Abdomen: Soft, NT/ND, Normal active Bowel Sounds   Extremities: No cyanosis, No edema    LABS:                        10.0   9.64  )-----------( 215      ( 25 Aug 2019 05:22 )             30.7         137  |  106  |  29  ----------------------------<  89  2.9   |  23  |  1.21    Ca    8.3      25 Aug 2019 05:22  Mg     1.5         TPro  6.0  /  Alb  2.2  /  TBili  0.8  /  DBili  x   /  AST  43  /  ALT  10  /  AlkPhos  84      eGFR if Non African American: 39 mL/min/1.73M2 (19 @ 05:22)  eGFR if African American: 46 mL/min/1.73M2 (19 @ 05:22)    PT/INR - ( 24 Aug 2019 12:15 )   PT: 12.0 sec;   INR: 1.07 ratio    PTT - ( 24 Aug 2019 12:15 )  PTT:24.5 sec    Lactate, Blood: 1.3 mmol/L ( @ 05:22)  Lactate, Blood: 1.9 mmol/L ( @ 19:57)  Lactate, Blood: 2.3 mmol/L ( @ 13:52)  Lactate, Blood: 4.0 mmol/L ( @ 12:20)    CARDIAC MARKERS ( 24 Aug 2019 16:17 )  .037 ng/mL / x     / x     / x     / x      CARDIAC MARKERS ( 24 Aug 2019 12:50 )  .043 ng/mL / x     / 906 U/L / x     / 6.9 ng/mL      Urinalysis Basic - ( 24 Aug 2019 13:15 )  Color: Che / Appearance: Clear / S.010 / pH: x  Gluc: x / Ketone: Trace  / Bili: Negative / Urobili: Negative   Blood: x / Protein: 100 / Nitrite: Negative   Leuk Esterase: Negative / RBC: 26-50 /HPF / WBC 0-2 /HPF   Sq Epi: x / Non Sq Epi: Neg.-Few / Bacteria: Trace /HPF      RADIOLOGY & ADDITIONAL TESTS:  < from: CT Head No Cont (19 @ 14:43) >  1)  volume loss and involutional changes commensurate with age. No acute   abnormality suggested..  2)  no intracerebral hemorrhage or contusion is identified.    < from: Xray Lumbosacral Spine (19 @ 14:40) >  IMPRESSION: Increasing lumbar and right hip degeneration.    Old right pelvic fractures new since .      I< from: CT Lumbar Spine No Cont (19 @ 15:04) >  MPRESSION: Dextroscoliosis with apex at L2-3.  Grade 1 posterior   spondylolisthesis is noted at L2-3 and grade 1 anterior spondylolisthesis   is noted at L4-5 on a degenerative basis .  Multilevel degenerative disc   disease and spondylosis at T10-11 through L5-S1 with bulges T11-12   through L4-5 which flatten the ventral thecal sac and narrow the   BILATERAL neural foramina. Mild central stenosis noted at T11-T12, L2-3   and L2-3, moderate central stenosis at L3-4 and L4-5 on a degenerative   basis . LEFT lateral disc herniations are noted at L1-2 through L4-5.  No   vertebral fracture is recognized.    < from: Xray Chest 1 View-PORTABLE IMMEDIATE (19 @ 12:34) >  IMPRESSION: Stable exam without acute finding.        Care Discussed with Consultants/Other Providers: Dr. Levi.

## 2019-08-25 NOTE — PROGRESS NOTE ADULT - ASSESSMENT
89yo female w. 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     *Possible syncope/fall: pt was found alert, but cannot recall what happened  r/o ACS  Tele Monitoring  Syncope w/u  EKG w/o ischemic changes  CT Head w/o acute infarct/bleed  Pending TTE    *LLE Weakness, Paresis  L-Spine XR & CT LS Spine as above no fracture, multilevel spondylotic and degenerative disc changes , left lateral disc herniations noted at L1-2 through L4-5.  Appreciate Neuro recs  CT Pelvis to evaluate left femoral neuropathy  Repeat CT Head 8/25 s/p head trauma f/u  Fall risk precautions  PT evaluation    *MYNOR: no known renal disease, likely prerenal due to decreased PO intake as pt was on the floor for at least 24hrs.  Cont IVF  Hold home Lisinopril  Avoid nephrotoxic meds  Trend Cr, improving     *Hypokalemia  Replete, trend    *Hypomagnesemia   Replete, trend    *Lactic acidosis: likely resulted from hypoperfusion from severe hypovolemia, improved with IVF resuscitation. No source of infection at this time  Improved s/p IVF    *Leukocytosis-Resolved  No sxs infection   UA Neg.  CXR- No infiltrate, consolation    *Hypothermia: T 96.5 on admission-resolved   f/u TSH and AM cortisol    *CAD: history of stent in 2008, known to Dr. Foote, has cardiac cath in 2014 that showed nonobstructive CAD.   ECG NSR with PVCs  Cont ASA, Lipitor, Metoprolol    *HTN  Cont Metoprolol  Hold lisinopril due to MYNOR, if hypertensive, can add Norvasc    *Hypothyroid  f/u TSH  Continue Synthroid    *DVT ppx  UFH 89yo female w. 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     *Possible syncope/fall: pt was found alert, but cannot recall what happened  r/o ACS  Tele Monitoring  Syncope w/u  EKG w/o ischemic changes  CT Head w/o acute infarct/bleed  Pending TTE    *LLE Weakness, Paresis  L-Spine XR & CT LS Spine as above no fracture, multilevel spondylotic and degenerative disc changes , left lateral disc herniations noted at L1-2 through L4-5.  Appreciate Neuro recs  CT Pelvis to evaluate left femoral neuropathy  Repeat CT Head 8/25 s/p head trauma f/u  Fall risk precautions  PT evaluation    *MYNOR on CKD stage 3b  - mynor resolved   - no known renal disease, likely prerenal due to decreased PO intake as pt was on the floor for at least 24hrs.  - Cont IVF  - Hold home Lisinopril  - Avoid nephrotoxic meds    *Hypokalemia  Replete, trend    *Hypomagnesemia   Replete, trend    *Lactic acidosis: likely resulted from hypoperfusion from severe hypovolemia, improved with IVF resuscitation. No source of infection at this time  Improved s/p IVF    *Leukocytosis-Resolved  No sxs infection   UA Neg.  CXR- No infiltrate, consolation    *Hypothermia: T 96.5 on admission-resolved   f/u TSH and AM cortisol    *CAD: history of stent in 2008, known to Dr. Foote, has cardiac cath in 2014 that showed nonobstructive CAD.   ECG NSR with PVCs  Cont ASA, Lipitor, Metoprolol    *HTN  Cont Metoprolol  Hold lisinopril due to MYNOR, if hypertensive, can add Norvasc as needed     *Hypothyroid  - tsh wnl   - Continue Synthroid    *DVT ppx  - heparin sc     #advanced directives / goals of care   - dnr/ dni/ comfort care/ limited abx & ivf as necessary/ no feeding tube/ no rehospitalization   - molst filled and placed in chart  - palliative care consult placed / hospice eval placed 89yo female w. 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     *Possible syncope/fall: pt was found alert, but cannot recall what happened  r/o ACS  Tele Monitoring  Syncope w/u  EKG w/o ischemic changes  CT Head w/o acute infarct/bleed  Pending TTE    *LLE Weakness, Paresis  L-Spine XR & CT LS Spine as above no fracture, multilevel spondylotic and degenerative disc changes , left lateral disc herniations noted at L1-2 through L4-5.  Appreciate Neuro recs  CT Pelvis to evaluate left femoral neuropathy  Repeat CT Head 8/25 s/p head trauma f/u  Fall risk precautions  PT evaluation    *MYNOR on CKD stage 3b  - mynor resolved   - no known renal disease, likely prerenal due to decreased PO intake as pt was on the floor for at least 24hrs.  - Cont IVF  - Hold home Lisinopril  - Avoid nephrotoxic meds    *Hypokalemia  Replete, trend    *Hypomagnesemia   Replete, trend    *Lactic acidosis: likely resulted from hypoperfusion from severe hypovolemia, improved with IVF resuscitation. No source of infection at this time  Improved s/p IVF    *Leukocytosis-Resolved  No sxs infection   UA Neg.  CXR- No infiltrate, consolation    *Hypothermia: T 96.5 on admission-resolved     *CAD: history of stent in 2008, known to Dr. Foote, has cardiac cath in 2014 that showed nonobstructive CAD.   ECG NSR with PVCs  Cont ASA, Lipitor, Metoprolol    *HTN  Cont Metoprolol  Hold lisinopril due to MYNOR, if hypertensive, can add Norvasc as needed     *Hypothyroid  - tsh wnl   - Continue Synthroid    *DVT ppx  - heparin sc     #advanced directives / goals of care   - dnr/ dni/ comfort care/ limited abx & ivf as necessary/ no feeding tube/ no rehospitalization   - molst filled and placed in chart  - palliative care consult placed / hospice eval placed

## 2019-08-26 ENCOUNTER — TRANSCRIPTION ENCOUNTER (OUTPATIENT)
Age: 84
End: 2019-08-26

## 2019-08-26 LAB
ANION GAP SERPL CALC-SCNC: 10 MMOL/L — SIGNIFICANT CHANGE UP (ref 5–17)
BUN SERPL-MCNC: 32 MG/DL — HIGH (ref 7–23)
CALCIUM SERPL-MCNC: 9 MG/DL — SIGNIFICANT CHANGE UP (ref 8.4–10.5)
CHLORIDE SERPL-SCNC: 103 MMOL/L — SIGNIFICANT CHANGE UP (ref 96–108)
CO2 SERPL-SCNC: 23 MMOL/L — SIGNIFICANT CHANGE UP (ref 22–31)
CORTIS AM PEAK SERPL-MCNC: 20.2 UG/DL — HIGH (ref 6–18.4)
CREAT SERPL-MCNC: 1.63 MG/DL — HIGH (ref 0.5–1.3)
GLUCOSE SERPL-MCNC: 84 MG/DL — SIGNIFICANT CHANGE UP (ref 70–99)
HCT VFR BLD CALC: 36 % — SIGNIFICANT CHANGE UP (ref 34.5–45)
HGB BLD-MCNC: 11.4 G/DL — LOW (ref 11.5–15.5)
MAGNESIUM SERPL-MCNC: 2.2 MG/DL — SIGNIFICANT CHANGE UP (ref 1.6–2.6)
MCHC RBC-ENTMCNC: 25.9 PG — LOW (ref 27–34)
MCHC RBC-ENTMCNC: 31.7 GM/DL — LOW (ref 32–36)
MCV RBC AUTO: 81.8 FL — SIGNIFICANT CHANGE UP (ref 80–100)
NRBC # BLD: 0 /100 WBCS — SIGNIFICANT CHANGE UP (ref 0–0)
PLATELET # BLD AUTO: 298 K/UL — SIGNIFICANT CHANGE UP (ref 150–400)
POTASSIUM SERPL-MCNC: 4.4 MMOL/L — SIGNIFICANT CHANGE UP (ref 3.5–5.3)
POTASSIUM SERPL-SCNC: 4.4 MMOL/L — SIGNIFICANT CHANGE UP (ref 3.5–5.3)
RBC # BLD: 4.4 M/UL — SIGNIFICANT CHANGE UP (ref 3.8–5.2)
RBC # FLD: 17.2 % — HIGH (ref 10.3–14.5)
SODIUM SERPL-SCNC: 136 MMOL/L — SIGNIFICANT CHANGE UP (ref 135–145)
WBC # BLD: 10.51 K/UL — HIGH (ref 3.8–10.5)
WBC # FLD AUTO: 10.51 K/UL — HIGH (ref 3.8–10.5)

## 2019-08-26 PROCEDURE — 93306 TTE W/DOPPLER COMPLETE: CPT | Mod: 26

## 2019-08-26 PROCEDURE — 99223 1ST HOSP IP/OBS HIGH 75: CPT

## 2019-08-26 PROCEDURE — 99239 HOSP IP/OBS DSCHRG MGMT >30: CPT

## 2019-08-26 PROCEDURE — 99497 ADVNCD CARE PLAN 30 MIN: CPT | Mod: 25

## 2019-08-26 PROCEDURE — 76770 US EXAM ABDO BACK WALL COMP: CPT | Mod: 26

## 2019-08-26 PROCEDURE — 76775 US EXAM ABDO BACK WALL LIM: CPT | Mod: 26

## 2019-08-26 RX ORDER — AMLODIPINE BESYLATE 2.5 MG/1
5 TABLET ORAL DAILY
Refills: 0 | Status: DISCONTINUED | OUTPATIENT
Start: 2019-08-26 | End: 2019-08-27

## 2019-08-26 RX ORDER — SODIUM CHLORIDE 9 MG/ML
1000 INJECTION INTRAMUSCULAR; INTRAVENOUS; SUBCUTANEOUS
Refills: 0 | Status: DISCONTINUED | OUTPATIENT
Start: 2019-08-26 | End: 2019-08-27

## 2019-08-26 RX ORDER — ACETAMINOPHEN 500 MG
650 TABLET ORAL EVERY 6 HOURS
Refills: 0 | Status: DISCONTINUED | OUTPATIENT
Start: 2019-08-26 | End: 2019-08-27

## 2019-08-26 RX ORDER — AMLODIPINE BESYLATE 2.5 MG/1
1 TABLET ORAL
Qty: 0 | Refills: 0 | DISCHARGE
Start: 2019-08-26

## 2019-08-26 RX ORDER — ACETAMINOPHEN 500 MG
975 TABLET ORAL EVERY 8 HOURS
Refills: 0 | Status: DISCONTINUED | OUTPATIENT
Start: 2019-08-26 | End: 2019-08-26

## 2019-08-26 RX ORDER — ACETAMINOPHEN 500 MG
500 TABLET ORAL EVERY 8 HOURS
Refills: 0 | Status: DISCONTINUED | OUTPATIENT
Start: 2019-08-26 | End: 2019-08-26

## 2019-08-26 RX ORDER — ACETAMINOPHEN 500 MG
2 TABLET ORAL
Qty: 0 | Refills: 0 | DISCHARGE
Start: 2019-08-26

## 2019-08-26 RX ORDER — LISINOPRIL 2.5 MG/1
1 TABLET ORAL
Qty: 0 | Refills: 0 | DISCHARGE

## 2019-08-26 RX ORDER — SENNA PLUS 8.6 MG/1
2 TABLET ORAL AT BEDTIME
Refills: 0 | Status: DISCONTINUED | OUTPATIENT
Start: 2019-08-26 | End: 2019-08-27

## 2019-08-26 RX ADMIN — Medication 25 MILLIGRAM(S): at 17:25

## 2019-08-26 RX ADMIN — HEPARIN SODIUM 5000 UNIT(S): 5000 INJECTION INTRAVENOUS; SUBCUTANEOUS at 05:18

## 2019-08-26 RX ADMIN — SODIUM CHLORIDE 75 MILLILITER(S): 9 INJECTION INTRAMUSCULAR; INTRAVENOUS; SUBCUTANEOUS at 17:00

## 2019-08-26 RX ADMIN — Medication 25 MILLIGRAM(S): at 05:18

## 2019-08-26 RX ADMIN — SENNA PLUS 2 TABLET(S): 8.6 TABLET ORAL at 21:42

## 2019-08-26 RX ADMIN — HEPARIN SODIUM 5000 UNIT(S): 5000 INJECTION INTRAVENOUS; SUBCUTANEOUS at 21:42

## 2019-08-26 RX ADMIN — Medication 81 MILLIGRAM(S): at 12:03

## 2019-08-26 RX ADMIN — Medication 650 MILLIGRAM(S): at 13:00

## 2019-08-26 RX ADMIN — Medication 650 MILLIGRAM(S): at 17:26

## 2019-08-26 RX ADMIN — Medication 50 MICROGRAM(S): at 05:18

## 2019-08-26 RX ADMIN — Medication 650 MILLIGRAM(S): at 12:03

## 2019-08-26 RX ADMIN — HEPARIN SODIUM 5000 UNIT(S): 5000 INJECTION INTRAVENOUS; SUBCUTANEOUS at 15:00

## 2019-08-26 RX ADMIN — ATORVASTATIN CALCIUM 40 MILLIGRAM(S): 80 TABLET, FILM COATED ORAL at 21:42

## 2019-08-26 RX ADMIN — AMLODIPINE BESYLATE 5 MILLIGRAM(S): 2.5 TABLET ORAL at 12:03

## 2019-08-26 RX ADMIN — Medication 650 MILLIGRAM(S): at 18:15

## 2019-08-26 NOTE — PROGRESS NOTE ADULT - SUBJECTIVE AND OBJECTIVE BOX
Patient is a 89 year old woman who was admitted with left leg paresis. Patient's daughter states the patient was seen by family 2 days ago prior to admission on Thursday and was walking without difficulty. The morning of admission, Saturday, she was found on the floor in her bedroom, sitting against the radiator with weakness of the left leg. She was not able to lift the leg in the air. She is not certain of how she fell. She denies LOC. She denies weakness of the other extremities. She denies numbness, bowell or bladder complaints. She denies pain involving the low back, leg, She denies HA or other neurological complaints. Yesterday she noted left and right low back pain without radiation. Today, the left and right low back pain continues without radiation but relieved with tylenol.    PMH:  CAD-S/P Stents-Dr. Foote           HTN, HLD-Dr. Polo-[facility]           Hypothyroidism           Renal CA-S/P RT and chemo 20 years ago            History of fall and pelvic fracture years ago not requiring surgery           S/P Hysterectomy 40 years ago    SH: Allergy to HCTZ, Levofloxacin, Cefuroxime        Smoking history 35 years ago. No history of ETOH abuse.        Lives alone.    [facility]: [place]            Breast CA    Exam: Awake, slowed mentation, appropriate           CN II-XII intact            Motor tone and strength -LUE-5/5                                                 LLE- , iliopsoas-3-/5, quad-4/5, hamstrings-5/5, dorsiflexion foot-5/5, plantar flexion foot-5/5                                                 RUE and RLE-5/5          Reflexes 2+ except 1+ ankle jerks               < from: CT Pelvis No Cont (08.25.19 @ 14:59) >    Comparison: 8/24/2019    Technique: Multiple axial scans of the pelvis were obtained from the   lower lumbar level to the proximal femurs without IV contrast material.    Interpretation: The upper cuts show right retroperitoneal and flank fluid   possibly indicating resorbing hematoma in the proper clinical setting.    The psoas muscles are symmetrical bilaterally. The left iliacus muscle is   unremarkable.    The bony structures show incompletely healed fracture of the right pubic   bone involving the medial superior ramus and the mid inferior ramus.    The inguinal regions are unremarkable. There is no evidence of hip   fracture or dislocation. The urinary bladder fills with smooth margins.    Impression: Right retroperitoneal and flank fluid. Right pubic bone   fractures. Unremarkable left iliopsoas psoas musculature.        < from: CT Head No Cont (08.25.19 @ 14:59) >    FINDINGS:     INTRACRANIAL FINDINGS: There is age-related atrophy and chronic   microvascular ischemic change. No evidence for acute territorial infarct,   mass lesion, mass effect, or recent hemorrhage. The ventricles are normal   in size. There is no abnormal extra axial collection.    EXTRACRANIAL FINDINGS: No extracalvarial soft tissue swelling. No   depressed calvarial fracture. The orbital contents are unremarkable. The   visualized paranasal sinuses are well aerated.The mastoid air cells are   clear.    IMPRESSION:     No acute intracranial hemorrhage, mass effect, or midline shift.         < end of copied text >

## 2019-08-26 NOTE — CONSULT NOTE ADULT - ASSESSMENT
A/P : 89 yo F with PMHx of  CAD s/p stents (2008), HTN, HLD, Hypothyroidism, hx of adenocarcinoma of rectum s/p chemo and radiation, arthritis,    brought in from home after son found her on bedroom floor.    -syncope/fall : CT head negative for infarct or hemorrhage,  neurology consulted  for LLE weakness, PT evaluation   CT lumbar shows Left lateral disc herniations are noted at L1-2 through L4-5  - Neurology consulted.  Pt oob in recliner, awake, alert, pleasant, admits to low back pain, son reports Tylenol was very helpful when given .   Pt has GOC established, MOLST completed upon admission , DNR/DNI   Spoke with family regarding next steps, as pt was living at her home alone.  Family is nearby for help. At this time, family interested in [facility],   PT eval pending   possible home hospice services upon d/c  from [facility], family will decide based on how she does in [facility] .  HCN number given to family for information, no hospice rep in hospital today.   Pt will likely be d/c soon, next 24-48 hrs ,  to [facility] .  Decreased po intake, add ensure supplements     For Pain will order Tylenol  PRN reports it was effective when used.   D/C prn IVP  MS (not using)    Constipation: will add Senna  .  Reviewed w/ med team Dr Levi

## 2019-08-26 NOTE — GOALS OF CARE CONVERSATION - PERSONAL ADVANCE DIRECTIVE - FUTURE HOSPITALIZATION/TRANSFER
Do not send to hospital unless pain or severe symptoms cannot be otherwise controlled
Send to hospital, if necessary, based on MOLST orders

## 2019-08-26 NOTE — PHYSICAL THERAPY INITIAL EVALUATION ADULT - ADDITIONAL COMMENTS
pt lives alone in private house, 7 mathew w/rail, nephew lives next door, uses sac to ambulate, independent w/ADL's and was driving prior to admission. pt also has a rolling walker

## 2019-08-26 NOTE — CONSULT NOTE ADULT - ATTENDING COMMENTS
I have personally seen and examined patient on the above date.  I discussed the case with Ashlee Escobar NP and I agree with findings and plan as detailed per note above, which I have amended where appropriate.

## 2019-08-26 NOTE — DISCHARGE NOTE PROVIDER - NSDCPNSUBOBJ_GEN_ALL_CORE
Patient is a 89y old  Female who presents with a chief complaint of Fall, Possible Syncope (25 Aug 2019 09:12)        Patient seen and examined at bedside. No overnight events reported. spoke to patient pcp Dr Polo regarding hospital course and pending discharge        ALLERGIES:    cefuroxime (Dystonic RXN)    hydrochlorothiazide (Anaphylaxis; Angioedema)    levofloxacin (Flushing)        MEDICATIONS  (STANDING):    aspirin enteric coated 81 milliGRAM(s) Oral daily    atorvastatin 40 milliGRAM(s) Oral at bedtime    heparin  Injectable 5000 Unit(s) SubCutaneous every 8 hours    levothyroxine 50 MICROGram(s) Oral daily    metoprolol tartrate 25 milliGRAM(s) Oral two times a day    sodium chloride 0.9%. 1000 milliLiter(s) (75 mL/Hr) IV Continuous <Continuous>        MEDICATIONS  (PRN):    acetaminophen   Tablet .. 650 milliGRAM(s) Oral every 6 hours PRN Temp greater or equal to 38C (100.4F), Mild Pain (1 - 3), Moderate Pain (4 - 6)        Vital Signs Last 24 Hrs    T(F): 98.2 (26 Aug 2019 04:59), Max: 98.2 (25 Aug 2019 14:00)    HR: 70 (26 Aug 2019 04:59) (70 - 82)    BP: 159/81 (26 Aug 2019 04:59) (140/88 - 173/86)    RR: 16 (26 Aug 2019 04:59) (16 - 16)    SpO2: 97% (26 Aug 2019 04:59) (97% - 99%)    I&O's Summary        25 Aug 2019 07:01  -  26 Aug 2019 07:00    --------------------------------------------------------    IN: 500 mL / OUT: 3 mL / NET: 497 mL        PHYSICAL EXAM:    General: NAD, A/O x 3    Neuro: BERNARD. CHEO BURKS.    ENT: MMM    Lungs: Clear to auscultation bilaterally      Cardio: +s1/s2    Abdomen: Soft, NT/ND, Normal active Bowel Sounds     Extremities: No cyanosis, No edema        LABS:                            11.4     10.51 )-----------( 298      ( 26 Aug 2019 06:26 )               36.0                 136  |  103  |  32    ----------------------------<  84    4.4   |  23  |  1.63        Ca    9.0      26 Aug 2019 06:26    Mg     2.2             TPro  6.0  /  Alb  2.2  /  TBili  0.8  /  DBili  x   /  AST  43  /  ALT  10  /  AlkPhos  84          eGFR if Non African American: 27 mL/min/1.73M2 (19 @ 06:26)    eGFR if African American: 32 mL/min/1.73M2 (19 @ 06:26)        PT/INR - ( 24 Aug 2019 12:15 )   PT: 12.0 sec;   INR: 1.07 ratio      PTT - ( 24 Aug 2019 12:15 )  PTT:24.5 sec        Lactate, Blood: 1.3 mmol/L ( @ 05:22)    Lactate, Blood: 1.9 mmol/L ( @ 19:57)    Lactate, Blood: 2.3 mmol/L ( @ 13:52)    Lactate, Blood: 4.0 mmol/L ( @ 12:20)        CARDIAC MARKERS ( 24 Aug 2019 16:17 )    .037 ng/mL / x     / x     / x     / x        CARDIAC MARKERS ( 24 Aug 2019 12:50 )    .043 ng/mL / x     / 906 U/L / x     / 6.9 ng/mL        TSH 2.93     TSH with FT4 reflex --    Total T3 --        Urinalysis Basic - ( 24 Aug 2019 13:15 )    Color: Che / Appearance: Clear / S.010 / pH: x    Gluc: x / Ketone: Trace  / Bili: Negative / Urobili: Negative     Blood: x / Protein: 100 / Nitrite: Negative     Leuk Esterase: Negative / RBC: 26-50 /HPF / WBC 0-2 /HPF     Sq Epi: x / Non Sq Epi: Neg.-Few / Bacteria: Trace /HPF        Culture - Urine (collected 24 Aug 2019 13:15)    Source: .Urine Clean Catch (Midstream)    Final Report (25 Aug 2019 22:16):      No growth        Culture - Blood (collected 24 Aug 2019 12:35)    Source: .Blood Blood-Peripheral    Preliminary Report (25 Aug 2019 20:01):      No growth to date.        Culture - Blood (collected 24 Aug 2019 12:35)    Source: .Blood Blood-Peripheral    Preliminary Report (25 Aug 2019 20:):      No growth to date.            RADIOLOGY & ADDITIONAL TESTS:    < from: CT Head No Cont (19 @ 14:59) > IMPRESSION: No acute intracranial hemorrhage, mass effect, or midline shift. < end of copied text >    < from: CT Pelvis No Cont (19 @ 14:59) > Impression: Right retroperitoneal and flank fluid. Right pubic bone fractures. Unremarkable left iliopsoas psoas musculature. < end of copied text >    < from: CT Lumbar Spine No Cont (19 @ 15:04) > IMPRESSION: Dextroscoliosis with apex at L2-3.  Grade 1 posterior spondylolisthesis is noted at L2-3 and grade 1 anterior spondylolisthesis     is noted at L4-5 on a degenerative basis .  Multilevel degenerative disc disease and spondylosis at T10-11 through L5-S1 with bulges T11-12 through L4-5 which flatten the ventral thecal sac and narrow the     BILATERAL neural foramina. Mild central stenosis noted at T11-T12, L2-3 and L2-3, moderate central stenosis at L3-4 and L4-5 on a degenerative basis . LEFT lateral disc herniations are noted at L1-2 through L4-5.  No     vertebral fracture is recognized. < end of copied text >    < from: CT Head No Cont (19 @ 14:43) > IMPRESSION: 1)  volume loss and involutional changes commensurate with age. No acute abnormality suggested. 2)  no intracerebral hemorrhage or contusion is identified. < end of copied text>    < from: Xray Lumbosacral Spine (19 @ 14:40) > IMPRESSION: Increasing lumbar and right hip degeneration. Old right pelvic fractures new since .< end of copied text >    < from: Xray Chest 1 View-PORTABLE IMMEDIATE (19 @ 12:34) > IMPRESSION: Stable exam without acute finding. < end of copied text >        Care Discussed with Consultants/Other Providers:     Neurology        89yo female w. 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         *Possible syncope/fall: pt was found alert, but cannot recall what happened    r/o ACS    Tele Monitoring    imaging as above     Pending TTE        *LLE Weakness, Paresis    imaging as above     Appreciate Neuro recs    Fall risk precautions    PT evaluation        *MYNOR on CKD stage 3b    - no known renal disease, likely prerenal due to decreased PO intake as pt was on the floor for at least 24hrs.    - Cont IVF    - Hold home Lisinopril    - Avoid nephrotoxic meds        *Hypokalemia    resolved        *Hypomagnesemia     resolved        *Lactic acidosis: likely resulted from hypoperfusion from severe hypovolemia, improved with IVF resuscitation. No source of infection at this time    Improved s/p IVF        *Leukocytosis- may be secondary to dehydration (hemoconcentration)    No sxs infection     UA Neg.    CXR- No infiltrate, consolation        *CAD: history of stent in , known to Dr. Foote, has cardiac cath in  that showed nonobstructive CAD.     ECG NSR with PVCs    Cont ASA, Lipitor, Metoprolol        *HTN    Cont Metoprolol    Hold lisinopril due to MYNOR    start norvasc        *Hypothyroid    - Continue Synthroid        *DVT ppx    - heparin sc         #advanced directives / goals of care     - dnr/ dni/ no feeding tube/ rehospitalization as necessary    - molst filled and placed in chart (redone today)    - palliative care consult        Time spent on patients discharge 32 minutes

## 2019-08-26 NOTE — CONSULT NOTE ADULT - SUBJECTIVE AND OBJECTIVE BOX
HPI:  89 yo F with PMHx of  CAD s/p stents (), HTN, HLD, Hypothyroidism, 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 Pt is independent with ADLs, able to ambulate and drive at baseline. Pt was last seen at her normal state of health on Thursday by her daughter. Her son came by to see her ,and found her on the floor, alert, with diarrhea in diaper, but pt does not remember what happened. Unclear if pt lost consciousness and fell, unclear presence of head trauma. Family denies recent illness, sick contacts, vomiting.      PAST MEDICAL & SURGICAL HISTORY:  Former smoker, stopped smoking in distant past  Adenocarcinoma of rectum: s/p chemo and radiation  Bronchitis  UTI (lower urinary tract infection)  Arthritis  Low back pain  HLD (hyperlipidemia)  HTN (hypertension)  Anemia  CAD (coronary artery disease)  S/P hysterectomy  S/P cataract extraction and insertion of intraocular lens  S/P angioplasty with stent: RCA with stent       SOCIAL HISTORY:    Admitted from:  home    Substance abuse history:              Tobacco hx:                  Alcohol hx:              Home Opioid hx:  Confucianism:                                    Preferred Language:    HCP/Daughter:     Michaelle Lopez       Phone#:  [phone] / [phone]  Son :  Brayden   [phone]    FAMILY HISTORY:  Family history of breast cancer: Mother  Family history of myocardial infarction: Brother    Baseline ADLs (prior to admission):    Allergies    cefuroxime (Dystonic RXN)  hydrochlorothiazide (Anaphylaxis; Angioedema)  levofloxacin (Flushing)    Intolerances      Present Symptoms:   Dyspnea: no  Nausea/Vomiting: no  Anxiety: no  Depressed mild  Fatigue: mild  Loss of appetite: yes  Pain:          yes                       location:        lower back   Review of Systems:  see hpi    MEDICATIONS  (STANDING):  amLODIPine   Tablet 5 milliGRAM(s) Oral daily  aspirin enteric coated 81 milliGRAM(s) Oral daily  atorvastatin 40 milliGRAM(s) Oral at bedtime  heparin  Injectable 5000 Unit(s) SubCutaneous every 8 hours  levothyroxine 50 MICROGram(s) Oral daily  metoprolol tartrate 25 milliGRAM(s) Oral two times a day  sodium chloride 0.9%. 1000 milliLiter(s) (75 mL/Hr) IV Continuous <Continuous>    MEDICATIONS  (PRN):  acetaminophen   Tablet .. 650 milliGRAM(s) Oral every 6 hours PRN Temp greater or equal to 38C (100.4F), Mild Pain (1 - 3), Moderate Pain (4 - 6)      PHYSICAL EXAM:    Vital Signs Last 24 Hrs  T(C): 36.6 (26 Aug 2019 11:58), Max: 36.8 (25 Aug 2019 14:00)  T(F): 97.9 (26 Aug 2019 11:58), Max: 98.2 (25 Aug 2019 14:00)  HR: 78 (26 Aug 2019 11:58) (70 - 82)  BP: 158/102 (26 Aug 2019 11:58) (140/88 - 173/86)  BP(mean): --  RR: 15 (26 Aug 2019 11:58) (15 - 16)  SpO2: 98% (26 Aug 2019 11:58) (97% - 99%)    General: alert  oriented x _3___    [place]  Palliative Performance Status Version2:  60   %  HEENT: n/c, a/t  [place]  Lungs: comfortable, clear   CV: s1s2  GI:      soft n/t + bs             constipation  last BM:   : normal  Musculoskeletal: normal  w/ weakness  no edema    Skin: warm dry, senile purpura   Neuro: mild deficits [place]   Oral intake ability:  full capability  Diet: low NA as gi      LABS:                        11.4   10.51 )-----------( 298      ( 26 Aug 2019 06:26 )             36.0     08-    136  |  103  |  32<H>  ----------------------------<  84  4.4   |  23  |  1.63<H>    Ca    9.0      26 Aug 2019 06:26  Mg     2.2     08-26    TPro  6.0  /  Alb  2.2<L>  /  TBili  0.8  /  DBili  x   /  AST  43<H>  /  ALT  10  /  AlkPhos  84  08-25    Urinalysis Basic - ( 24 Aug 2019 13:15 )    Color: Che / Appearance: Clear / S.010 / pH: x  Gluc: x / Ketone: Trace  / Bili: Negative / Urobili: Negative   Blood: x / Protein: 100 / Nitrite: Negative   Leuk Esterase: Negative / RBC: 26-50 /HPF / WBC 0-2 /HPF   Sq Epi: x / Non Sq Epi: Neg.-Few / Bacteria: Trace /HPF        RADIOLOGY & ADDITIONAL STUDIES:< from: CT Lumbar Spine No Cont (19 @ 15:04) >  EXAM:  CT LUMBAR SPINE      PROCEDURE DATE:  2019        INTERPRETATION:      CT lumbar spine without IV contrast        CLINICAL INFORMATION: Weakness LEFT lower extremity   Back pain, spinal   stenosis, spondylosis.    TECHNIQUE:  Contiguous axial 2 mm sections were obtained through the   lumbar spine using a single helical acquisition.   Additional 2 mm   sagittal and coronal reconstructions of the spine were obtained. These   additional reformatted images were used to evaluate the spine for   alignment, vertebral fractures and the integrity of the the posterior   elements.  This scan was performed using automatic exposure control   (radiation dose reduction software) to obtain a diagnostic image quality   scan with patient dose aslow as reasonably achievable.    FINDINGS:   X-ray dated 2019 available for review    Lumbar vertebral body heights are maintained. No vertebral fracture is   seen. No destructive bone lesion is found.  Alignment is significant for   a dextroscoliosis with apex at L2-3.  Grade 1 posterior spondylolisthesis   is noted at L2-3 and grade 1 anterior spondylolisthesis is noted at L4-5   on a degenerative basis due to disc bulge, facet osteophytic hypertrophy   and redundancy of ligamentum flavum. The visualized sacral and pelvic   bones appear intact.    Lumbar intervertebral disc spaces show multilevel degenerative disc   disease and spondylosis at T10-11 through L5-S1 with loss of disc height   and associated degenerative endplate changes.Disc bulges are noted at   T11-12 through L4-5 which flatten the ventral thecal sac and narrow the   BILATERAL neural foramina. Mild central stenosis noted at T11-T12, L2-3   and L2-3, moderate central stenosis at L3-4 and L4-5 on a degenerative   basis due to disc bulge, facet osteophytic hypertrophy and redundancy of   ligamentum flavum. LEFT lateral disc herniations are noted at L1-2   through L4-5.     No paraspinal mass is recognized.  Paraspinal soft tissues appear   intact. Moderate sigmoid diverticulosis is noted. There is a large hiatal   hernia.      IMPRESSION: Dextroscoliosis with apex at L2-3.  Grade 1 posterior   spondylolisthesis is noted at L2-3 and grade 1 anterior spondylolisthesis   is noted at L4-5 on a degenerative basis .  Multilevel degenerative disc   disease and spondylosis at T10-11 through L5-S1 with bulges T11-12   through L4-5 which flatten the ventral thecal sac and narrow the   BILATERAL neural foramina. Mild central stenosis noted at T11-T12, L2-3   and L2-3, moderate central stenosis at L3-4 and L4-5 on a degenerative   basis . LEFT lateral disc herniations are noted at L1-2 through L4-5.  No   vertebral fracture is recognized.      < from: CT Pelvis No Cont (19 @ 14:59) >    EXAM:  CT PELVIS ONLY      PROCEDURE DATE:  2019        INTERPRETATION:  CT of the pelvis without contrast    History: Left femoral neuropathy    Comparison: 2019    Technique: Multiple axial scans of the pelvis were obtained from the   lower lumbar level to the proximal femurs without IV contrast material.    Interpretation: The upper cuts show right retroperitoneal and flank fluid   possibly indicating resorbing hematoma in the proper clinical setting.    The psoas muscles are symmetrical bilaterally. The left iliacus muscle is   unremarkable.    The bony structures show incompletely healed fracture of the right pubic   bone involving the medial superior ramus and the mid inferior ramus.    The inguinal regions are unremarkable. There is no evidence of hip   fracture or dislocation. The urinary bladder fills with smooth margins.    Impression: Right retroperitoneal and flank fluid. Right pubic bone   fractures. Unremarkable left iliopsoas psoas musculature.                    ADVANCE DIRECTIVES: HCP   MOLST  DNR/DNI     Advanced Care Planning discussion total time spent: HPI:  89 yo F with PMHx of  CAD s/p stents (), HTN, HLD, Hypothyroidism, 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 Pt is independent with ADLs, able to ambulate and drive at baseline. Pt was last seen at her normal state of health on Thursday by her daughter. Her son came by to see her ,and found her on the floor, alert, with diarrhea in diaper, but pt does not remember what happened. Unclear if pt lost consciousness and fell, unclear presence of head trauma. Family denies recent illness, sick contacts, vomiting.      PAST MEDICAL & SURGICAL HISTORY:  Former smoker, stopped smoking in distant past  Adenocarcinoma of rectum: s/p chemo and radiation  Bronchitis  UTI (lower urinary tract infection)  Arthritis  Low back pain  HLD (hyperlipidemia)  HTN (hypertension)  Anemia  CAD (coronary artery disease)  S/P hysterectomy  S/P cataract extraction and insertion of intraocular lens  S/P angioplasty with stent: RCA with stent       SOCIAL HISTORY:    Admitted from:  home        HCP/Daughter:     Michaelle Lopez       Phone#:  [phone]  /  [phone]  Son :  Brayden   [phone]    FAMILY HISTORY:  Family history of breast cancer: Mother  Family history of myocardial infarction: Brother    Baseline ADLs (prior to admission):    Allergies    cefuroxime (Dystonic RXN)  hydrochlorothiazide (Anaphylaxis; Angioedema)  levofloxacin (Flushing)    Intolerances      Present Symptoms:   Dyspnea: no  Nausea/Vomiting: no  Anxiety: no  Depressed mild  Fatigue: mild  Loss of appetite: yes  Pain:          yes                       location:        lower back   Review of Systems:  see hpi    MEDICATIONS  (STANDING):  amLODIPine   Tablet 5 milliGRAM(s) Oral daily  aspirin enteric coated 81 milliGRAM(s) Oral daily  atorvastatin 40 milliGRAM(s) Oral at bedtime  heparin  Injectable 5000 Unit(s) SubCutaneous every 8 hours  levothyroxine 50 MICROGram(s) Oral daily  metoprolol tartrate 25 milliGRAM(s) Oral two times a day  sodium chloride 0.9%. 1000 milliLiter(s) (75 mL/Hr) IV Continuous <Continuous>    MEDICATIONS  (PRN):  acetaminophen   Tablet .. 650 milliGRAM(s) Oral every 6 hours PRN Temp greater or equal to 38C (100.4F), Mild Pain (1 - 3), Moderate Pain (4 - 6)      PHYSICAL EXAM:    Vital Signs Last 24 Hrs  T(C): 36.6 (26 Aug 2019 11:58), Max: 36.8 (25 Aug 2019 14:00)  T(F): 97.9 (26 Aug 2019 11:58), Max: 98.2 (25 Aug 2019 14:00)  HR: 78 (26 Aug 2019 11:58) (70 - 82)  BP: 158/102 (26 Aug 2019 11:58) (140/88 - 173/86)  BP(mean): --  RR: 15 (26 Aug 2019 11:58) (15 - 16)  SpO2: 98% (26 Aug 2019 11:58) (97% - 99%)    General: alert  oriented x _3___    [place]  Palliative Performance Status Version2:  60   %  HEENT: n/c, a/t  [place]  Lungs: comfortable, clear   CV: s1s2  GI:      soft n/t + bs             constipation  last BM:   : normal  Musculoskeletal: normal  w/ weakness  no edema    Skin: warm dry, senile purpura   Neuro: mild deficits [place]   Oral intake ability:  full capability  Diet: low NA as gi      LABS:                        11.4   10.51 )-----------( 298      ( 26 Aug 2019 06:26 )             36.0     08-    136  |  103  |  32<H>  ----------------------------<  84  4.4   |  23  |  1.63<H>    Ca    9.0      26 Aug 2019 06:26  Mg     2.2     08-    TPro  6.0  /  Alb  2.2<L>  /  TBili  0.8  /  DBili  x   /  AST  43<H>  /  ALT  10  /  AlkPhos  84  08-25    Urinalysis Basic - ( 24 Aug 2019 13:15 )    Color: Che / Appearance: Clear / S.010 / pH: x  Gluc: x / Ketone: Trace  / Bili: Negative / Urobili: Negative   Blood: x / Protein: 100 / Nitrite: Negative   Leuk Esterase: Negative / RBC: 26-50 /HPF / WBC 0-2 /HPF   Sq Epi: x / Non Sq Epi: Neg.-Few / Bacteria: Trace /HPF        RADIOLOGY & ADDITIONAL STUDIES:< from: CT Lumbar Spine No Cont (19 @ 15:04) >  EXAM:  CT LUMBAR SPINE      PROCEDURE DATE:  2019        INTERPRETATION:      CT lumbar spine without IV contrast        CLINICAL INFORMATION: Weakness LEFT lower extremity   Back pain, spinal   stenosis, spondylosis.    TECHNIQUE:  Contiguous axial 2 mm sections were obtained through the   lumbar spine using a single helical acquisition.   Additional 2 mm   sagittal and coronal reconstructions of the spine were obtained. These   additional reformatted images were used to evaluate the spine for   alignment, vertebral fractures and the integrity of the the posterior   elements.  This scan was performed using automatic exposure control   (radiation dose reduction software) to obtain a diagnostic image quality   scan with patient dose aslow as reasonably achievable.    FINDINGS:   X-ray dated 2019 available for review    Lumbar vertebral body heights are maintained. No vertebral fracture is   seen. No destructive bone lesion is found.  Alignment is significant for   a dextroscoliosis with apex at L2-3.  Grade 1 posterior spondylolisthesis   is noted at L2-3 and grade 1 anterior spondylolisthesis is noted at L4-5   on a degenerative basis due to disc bulge, facet osteophytic hypertrophy   and redundancy of ligamentum flavum. The visualized sacral and pelvic   bones appear intact.    Lumbar intervertebral disc spaces show multilevel degenerative disc   disease and spondylosis at T10-11 through L5-S1 with loss of disc height   and associated degenerative endplate changes.Disc bulges are noted at   T11-12 through L4-5 which flatten the ventral thecal sac and narrow the   BILATERAL neural foramina. Mild central stenosis noted at T11-T12, L2-3   and L2-3, moderate central stenosis at L3-4 and L4-5 on a degenerative   basis due to disc bulge, facet osteophytic hypertrophy and redundancy of   ligamentum flavum. LEFT lateral disc herniations are noted at L1-2   through L4-5.     No paraspinal mass is recognized.  Paraspinal soft tissues appear   intact. Moderate sigmoid diverticulosis is noted. There is a large hiatal   hernia.      IMPRESSION: Dextroscoliosis with apex at L2-3.  Grade 1 posterior   spondylolisthesis is noted at L2-3 and grade 1 anterior spondylolisthesis   is noted at L4-5 on a degenerative basis .  Multilevel degenerative disc   disease and spondylosis at T10-11 through L5-S1 with bulges T11-12   through L4-5 which flatten the ventral thecal sac and narrow the   BILATERAL neural foramina. Mild central stenosis noted at T11-T12, L2-3   and L2-3, moderate central stenosis at L3-4 and L4-5 on a degenerative   basis . LEFT lateral disc herniations are noted at L1-2 through L4-5.  No   vertebral fracture is recognized.      < from: CT Pelvis No Cont (19 @ 14:59) >    EXAM:  CT PELVIS ONLY      PROCEDURE DATE:  2019        INTERPRETATION:  CT of the pelvis without contrast    History: Left femoral neuropathy    Comparison: 2019    Technique: Multiple axial scans of the pelvis were obtained from the   lower lumbar level to the proximal femurs without IV contrast material.    Interpretation: The upper cuts show right retroperitoneal and flank fluid   possibly indicating resorbing hematoma in the proper clinical setting.    The psoas muscles are symmetrical bilaterally. The left iliacus muscle is   unremarkable.    The bony structures show incompletely healed fracture of the right pubic   bone involving the medial superior ramus and the mid inferior ramus.    The inguinal regions are unremarkable. There is no evidence of hip   fracture or dislocation. The urinary bladder fills with smooth margins.    Impression: Right retroperitoneal and flank fluid. Right pubic bone   fractures. Unremarkable left iliopsoas psoas musculature.                    ADVANCE DIRECTIVES: HCP   MOLST  DNR/DNI     Advanced Care Planning discussion total time spent:

## 2019-08-26 NOTE — GOALS OF CARE CONVERSATION - PERSONAL ADVANCE DIRECTIVE - CONVERSATION DETAILS
DNR   DNI  No feeding tube  Limited Abx use  IVF as needed  comfort care  no rehospitalization    see molst for further details
see molst

## 2019-08-26 NOTE — DISCHARGE NOTE PROVIDER - NSDCCPCAREPLAN_GEN_ALL_CORE_FT
PRINCIPAL DISCHARGE DIAGNOSIS  Diagnosis: Left leg weakness  Assessment and Plan of Treatment: - may be secondary to fall vs syncope  - follow up with pcp and neurology  - take medicaitons as rx   - po hydration

## 2019-08-26 NOTE — GOALS OF CARE CONVERSATION - PERSONAL ADVANCE DIRECTIVE - TREATMENT GUIDELINES
Antibiotic trial/No artificial nutrition/DNR Order/Do not re-hospitalize/Comfort measures only/IV fluid trial
Antibiotic trial/IV fluid trial/No artificial nutrition/DNR Order

## 2019-08-26 NOTE — DISCHARGE NOTE PROVIDER - NSDCFUSCHEDAPPT_GEN_ALL_CORE_FT
GEOVANNA PULIDO ; 10/24/2019 ; [facility] GEOVANNA PULIDO ; 10/24/2019 ; [facility] GEOVANNA PULIDO ; 10/24/2019 ; [facility]

## 2019-08-26 NOTE — DISCHARGE NOTE PROVIDER - CARE PROVIDERS DIRECT ADDRESSES
,DirectAddress_Unknown,[email or web address]

## 2019-08-26 NOTE — PROGRESS NOTE ADULT - ASSESSMENT
Patient is a 89 year old woman who was admitted with left leg paresis. Patient's daughter states the patient was seen by family 2 days ago prior to admission on Thursday and was walking without difficulty. The morning of admission, Saturday, she was found on the floor in her bedroom, sitting against the radiator with weakness of the left leg. She was not able to lift the leg in the air. She is not certain of how she fell. She denies LOC. She denies weakness of the other extremities. She denies numbness, bowell or bladder complaints. She denies pain involving the low back, leg, She denies HA or other neurological complaints.  Patient's daughter the day of admission stated improvement in left leg weakness since arrived at the hospital as previously not able to elevate the left leg and then briefly able to lift the left leg. Yesteraday she noted left and right low back pain which continued today but relieved with tylenol. Neurological exam improved.    1) CT Pelvis as above. Unremarkable left iliopsoas musculature. Right retroperitoneal and flank fluid.  2) plain x-ray and CT LS Spine  no fracture, multilevel spondylotic and degenerative disc changes , left lateral disc herniations noted at L1-2 through L4-5  3) Repeat CT Head 8/25  as above . no hemorrhage, chronic microvascular ischemic changes.  4) Discussed with patient and patient's daughter consideration of MRI LS Spine to evaluate left lumbar radiculopathy,  and outpatient EMG/NCV study to evaluate left lumbar radiculopathy and femoral neuropathy Patient and patient's daughter as continues to improve would like to undergo  rehabilitation and not consider those studies at present.

## 2019-08-26 NOTE — DISCHARGE NOTE PROVIDER - CARE PROVIDER_API CALL
Sarah Diamond)  Neurology  [address] [place] [address]  Phone: [phone]  Follow Up Time:     Bing Polo)  Family Medicine  [address] [place] [id number]  Phone: [phone]  Fax: [phone]  Follow Up Time:

## 2019-08-26 NOTE — DISCHARGE NOTE PROVIDER - HOSPITAL COURSE
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 Pt is independent with ADLs, able to ambulate and drive at baseline. Patient back to baseline per family. does not want mri in patient as does not know what kind of cardiac stents she has and do not want to take a chance at this time understand risk and benefits of this. 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 Pt is independent with ADLs, able to ambulate and drive at baseline. Patient back to baseline per family. does not want mri in patient as does not know what kind of cardiac stents she has and do not want to take a chance at this time understand risk and benefits of this.         Found to have MYNOR. Lisinopril held, IVF given w. improvement. Renal u/s showing hydro. Course also c/b urinary retention, requiring straight caths. Medically cleared for d/c to [facility] (informed able to insert Wolf if needed).

## 2019-08-27 ENCOUNTER — TRANSCRIPTION ENCOUNTER (OUTPATIENT)
Age: 84
End: 2019-08-27

## 2019-08-27 VITALS
HEART RATE: 93 BPM | TEMPERATURE: 98 F | DIASTOLIC BLOOD PRESSURE: 69 MMHG | OXYGEN SATURATION: 98 % | RESPIRATION RATE: 15 BRPM | SYSTOLIC BLOOD PRESSURE: 134 MMHG

## 2019-08-27 LAB
ANION GAP SERPL CALC-SCNC: 9 MMOL/L — SIGNIFICANT CHANGE UP (ref 5–17)
BUN SERPL-MCNC: 26 MG/DL — HIGH (ref 7–23)
CALCIUM SERPL-MCNC: 8.3 MG/DL — LOW (ref 8.4–10.5)
CHLORIDE SERPL-SCNC: 105 MMOL/L — SIGNIFICANT CHANGE UP (ref 96–108)
CO2 SERPL-SCNC: 23 MMOL/L — SIGNIFICANT CHANGE UP (ref 22–31)
CREAT SERPL-MCNC: 1.1 MG/DL — SIGNIFICANT CHANGE UP (ref 0.5–1.3)
GLUCOSE SERPL-MCNC: 97 MG/DL — SIGNIFICANT CHANGE UP (ref 70–99)
HCT VFR BLD CALC: 32.6 % — LOW (ref 34.5–45)
HGB BLD-MCNC: 10.6 G/DL — LOW (ref 11.5–15.5)
MCHC RBC-ENTMCNC: 26.4 PG — LOW (ref 27–34)
MCHC RBC-ENTMCNC: 32.5 GM/DL — SIGNIFICANT CHANGE UP (ref 32–36)
MCV RBC AUTO: 81.3 FL — SIGNIFICANT CHANGE UP (ref 80–100)
NRBC # BLD: 0 /100 WBCS — SIGNIFICANT CHANGE UP (ref 0–0)
PLATELET # BLD AUTO: 268 K/UL — SIGNIFICANT CHANGE UP (ref 150–400)
POTASSIUM SERPL-MCNC: 3.7 MMOL/L — SIGNIFICANT CHANGE UP (ref 3.5–5.3)
POTASSIUM SERPL-SCNC: 3.7 MMOL/L — SIGNIFICANT CHANGE UP (ref 3.5–5.3)
RBC # BLD: 4.01 M/UL — SIGNIFICANT CHANGE UP (ref 3.8–5.2)
RBC # FLD: 17.2 % — HIGH (ref 10.3–14.5)
SODIUM SERPL-SCNC: 137 MMOL/L — SIGNIFICANT CHANGE UP (ref 135–145)
WBC # BLD: 9.14 K/UL — SIGNIFICANT CHANGE UP (ref 3.8–10.5)
WBC # FLD AUTO: 9.14 K/UL — SIGNIFICANT CHANGE UP (ref 3.8–10.5)

## 2019-08-27 PROCEDURE — 93005 ELECTROCARDIOGRAM TRACING: CPT

## 2019-08-27 PROCEDURE — 72110 X-RAY EXAM L-2 SPINE 4/>VWS: CPT

## 2019-08-27 PROCEDURE — 36415 COLL VENOUS BLD VENIPUNCTURE: CPT

## 2019-08-27 PROCEDURE — 96365 THER/PROPH/DIAG IV INF INIT: CPT | Mod: XU

## 2019-08-27 PROCEDURE — 84443 ASSAY THYROID STIM HORMONE: CPT

## 2019-08-27 PROCEDURE — 71045 X-RAY EXAM CHEST 1 VIEW: CPT

## 2019-08-27 PROCEDURE — 82550 ASSAY OF CK (CPK): CPT

## 2019-08-27 PROCEDURE — 99285 EMERGENCY DEPT VISIT HI MDM: CPT | Mod: 25

## 2019-08-27 PROCEDURE — 85027 COMPLETE CBC AUTOMATED: CPT

## 2019-08-27 PROCEDURE — 72131 CT LUMBAR SPINE W/O DYE: CPT

## 2019-08-27 PROCEDURE — 83735 ASSAY OF MAGNESIUM: CPT

## 2019-08-27 PROCEDURE — 99233 SBSQ HOSP IP/OBS HIGH 50: CPT

## 2019-08-27 PROCEDURE — 76770 US EXAM ABDO BACK WALL COMP: CPT

## 2019-08-27 PROCEDURE — 81001 URINALYSIS AUTO W/SCOPE: CPT

## 2019-08-27 PROCEDURE — 72192 CT PELVIS W/O DYE: CPT

## 2019-08-27 PROCEDURE — 82553 CREATINE MB FRACTION: CPT

## 2019-08-27 PROCEDURE — 80053 COMPREHEN METABOLIC PANEL: CPT

## 2019-08-27 PROCEDURE — C8929: CPT

## 2019-08-27 PROCEDURE — 51701 INSERT BLADDER CATHETER: CPT

## 2019-08-27 PROCEDURE — 82533 TOTAL CORTISOL: CPT

## 2019-08-27 PROCEDURE — 85730 THROMBOPLASTIN TIME PARTIAL: CPT

## 2019-08-27 PROCEDURE — 87086 URINE CULTURE/COLONY COUNT: CPT

## 2019-08-27 PROCEDURE — 83605 ASSAY OF LACTIC ACID: CPT

## 2019-08-27 PROCEDURE — 70450 CT HEAD/BRAIN W/O DYE: CPT

## 2019-08-27 PROCEDURE — 76775 US EXAM ABDO BACK WALL LIM: CPT

## 2019-08-27 PROCEDURE — 87040 BLOOD CULTURE FOR BACTERIA: CPT

## 2019-08-27 PROCEDURE — 97162 PT EVAL MOD COMPLEX 30 MIN: CPT

## 2019-08-27 PROCEDURE — 85610 PROTHROMBIN TIME: CPT

## 2019-08-27 PROCEDURE — 96361 HYDRATE IV INFUSION ADD-ON: CPT | Mod: XU

## 2019-08-27 PROCEDURE — 80048 BASIC METABOLIC PNL TOTAL CA: CPT

## 2019-08-27 PROCEDURE — 84484 ASSAY OF TROPONIN QUANT: CPT

## 2019-08-27 RX ORDER — DOCUSATE SODIUM 100 MG
100 CAPSULE ORAL
Refills: 0 | Status: DISCONTINUED | OUTPATIENT
Start: 2019-08-27 | End: 2019-08-27

## 2019-08-27 RX ADMIN — HEPARIN SODIUM 5000 UNIT(S): 5000 INJECTION INTRAVENOUS; SUBCUTANEOUS at 13:13

## 2019-08-27 RX ADMIN — HEPARIN SODIUM 5000 UNIT(S): 5000 INJECTION INTRAVENOUS; SUBCUTANEOUS at 05:39

## 2019-08-27 RX ADMIN — Medication 81 MILLIGRAM(S): at 09:31

## 2019-08-27 RX ADMIN — Medication 50 MICROGRAM(S): at 05:39

## 2019-08-27 RX ADMIN — AMLODIPINE BESYLATE 5 MILLIGRAM(S): 2.5 TABLET ORAL at 05:39

## 2019-08-27 RX ADMIN — Medication 10 MILLIGRAM(S): at 09:32

## 2019-08-27 RX ADMIN — Medication 25 MILLIGRAM(S): at 05:39

## 2019-08-27 RX ADMIN — Medication 650 MILLIGRAM(S): at 13:30

## 2019-08-27 RX ADMIN — Medication 650 MILLIGRAM(S): at 12:30

## 2019-08-27 RX ADMIN — SODIUM CHLORIDE 75 MILLILITER(S): 9 INJECTION INTRAMUSCULAR; INTRAVENOUS; SUBCUTANEOUS at 05:39

## 2019-08-27 NOTE — PROGRESS NOTE ADULT - SUBJECTIVE AND OBJECTIVE BOX
Progress: f/u palliative , no events overnight, pt looks comfortable, no c/o pain this AM     Present Symptoms:   Dyspnea: no  Nausea/Vomiting: no  Anxiety:    Depressed Mood:   Fatigue:   Loss of appetite:   Pain:                   location:   Review of Systems: [All others negative or Unable to obtain due to poor mentation]    MEDICATIONS  (STANDING):  amLODIPine   Tablet 5 milliGRAM(s) Oral daily  aspirin enteric coated 81 milliGRAM(s) Oral daily  atorvastatin 40 milliGRAM(s) Oral at bedtime  docusate sodium 100 milliGRAM(s) Oral two times a day  heparin  Injectable 5000 Unit(s) SubCutaneous every 8 hours  levothyroxine 50 MICROGram(s) Oral daily  metoprolol tartrate 25 milliGRAM(s) Oral two times a day  senna 2 Tablet(s) Oral at bedtime  sodium chloride 0.9%. 1000 milliLiter(s) (75 mL/Hr) IV Continuous <Continuous>    MEDICATIONS  (PRN):  acetaminophen   Tablet .. 650 milliGRAM(s) Oral every 6 hours PRN Temp greater or equal to 38C (100.4F), Mild Pain (1 - 3), Moderate Pain (4 - 6)      PHYSICAL EXAM:  Vital Signs Last 24 Hrs  T(C): 36.8 (27 Aug 2019 09:28), Max: 36.8 (27 Aug 2019 09:28)  T(F): 98.3 (27 Aug 2019 09:28), Max: 98.3 (27 Aug 2019 09:28)  HR: 84 (27 Aug 2019 09:28) (75 - 91)  BP: 133/60 (27 Aug 2019 09:28) (133/60 - 173/74)  BP(mean): 93 (26 Aug 2019 17:28) (93 - 93)  RR: 15 (27 Aug 2019 09:28) (15 - 18)  SpO2: 99% (27 Aug 2019 09:28) (98% - 99%)  General: alert  oriented x ____      HEENT: normal    Lungs: comfortable   CV: normal    GI: normal    : normal    Musculoskeletal: normal   Skin: normal    Neuro: no deficits   Oral intake ability:  oral feeding  Diet: NPO,     LABS:                          10.6   9.14  )-----------( 268      ( 27 Aug 2019 06:18 )             32.6     08-27    137  |  105  |  26<H>  ----------------------------<  97  3.7   |  23  |  1.10    Ca    8.3<L>      27 Aug 2019 06:18  Mg     2.2     08-26          RADIOLOGY & ADDITIONAL STUDIES:    ADVANCE DIRECTIVES:  Advanced Care Planning discussion total time spent: Progress: f/u palliative , no events overnight, pt looks comfortable, no c/o pain this AM , reports tylenol effective .    Present Symptoms:   Dyspnea: no  Nausea/Vomiting: no  Anxiety:  no  Depressed Mood: no  Fatigue: no  Loss of appetite: no  Pain:  no, not at present          location:   Review of Systems: hpi    MEDICATIONS  (STANDING):  amLODIPine   Tablet 5 milliGRAM(s) Oral daily  aspirin enteric coated 81 milliGRAM(s) Oral daily  atorvastatin 40 milliGRAM(s) Oral at bedtime  docusate sodium 100 milliGRAM(s) Oral two times a day  heparin  Injectable 5000 Unit(s) SubCutaneous every 8 hours  levothyroxine 50 MICROGram(s) Oral daily  metoprolol tartrate 25 milliGRAM(s) Oral two times a day  senna 2 Tablet(s) Oral at bedtime  sodium chloride 0.9%. 1000 milliLiter(s) (75 mL/Hr) IV Continuous <Continuous>    MEDICATIONS  (PRN):  acetaminophen   Tablet .. 650 milliGRAM(s) Oral every 6 hours PRN Temp greater or equal to 38C (100.4F), Mild Pain (1 - 3), Moderate Pain (4 - 6)      PHYSICAL EXAM:  Vital Signs Last 24 Hrs  T(C): 36.8 (27 Aug 2019 09:28), Max: 36.8 (27 Aug 2019 09:28)  T(F): 98.3 (27 Aug 2019 09:28), Max: 98.3 (27 Aug 2019 09:28)  HR: 84 (27 Aug 2019 09:28) (75 - 91)  BP: 133/60 (27 Aug 2019 09:28) (133/60 - 173/74)  BP(mean): 93 (26 Aug 2019 17:28) (93 - 93)  RR: 15 (27 Aug 2019 09:28) (15 - 18)  SpO2: 99% (27 Aug 2019 09:28) (98% - 99%)  General: alert  oriented x __3__      HEENT: n/c, a/t     Lungs: clear, comfortable   CV: s1s2    GI: soft, n/t     : normal    Musculoskeletal: normal , weak   Skin: warm dry fragile     Neuro: no deficits   Oral intake ability:  oral feeding  Diet: reg     LABS:                          10.6   9.14  )-----------( 268      ( 27 Aug 2019 06:18 )             32.6     08-27    137  |  105  |  26<H>  ----------------------------<  97  3.7   |  23  |  1.10    Ca    8.3<L>      27 Aug 2019 06:18  Mg     2.2     08-26          RADIOLOGY & ADDITIONAL STUDIES:    [facility]  [facility]    Advanced Care Planning discussion total time spent:

## 2019-08-27 NOTE — PROGRESS NOTE ADULT - ASSESSMENT
89yo female w. PMH CAD s/p stents (2008), HTN, HLD, Hypothyroidism, Adenocarcinoma of rectum s/p chemo and XRT, Arthritis brought in from home after son found her on bedroom floor.     *Possible syncope/fall: pt was found alert, but cannot recall what happened  r/o ACS  Tele Monitoring- w/o events d/c  EKG w/o ischemic changes  CT Head w/o acute infarct/bleed  TTE EF 45-50%, Mild decrease LV Fnx, Grade II diastolic dysfunction, Mod-Severe TR, Severe pHTN     *LLE Weakness, Paresis  Fall risk precautions  PT   L-Spine XR & CT LS Spine as above no fracture, multilevel spondylotic and degenerative disc changes , left lateral disc herniations noted at L1-2 through L4-5.  Appreciate Neuro recs  CT Pelvis- Unremarkable left iliopsoas musculature. Right retroperitoneal and flank fluid.  Repeat CT Head 8/25 s/p head trauma f/u w/o acute findings. Chronic microvascular ischemic changes.  MRI LS Spine to evaluate left lumbar radiculopathy,  and outpatient EMG/NCV study as outpt if in align w. pt's GOC     *MYNOR on CKD stage 3b  MYNOR improved w. IVF  No known renal disease, likely prerenal due to decreased PO intake as pt was on the floor for at least 24hrs.  Hold home Lisinopril  Avoid nephrotoxic meds    *Urinary retention  Straight cath prn   Monitor Bladder scans  If persistent will place Wolf & outpt  f/u    *Hypokalemia- resolved   s/p repletion    *Hypomagnesemia- resolved   s/p repletion     *Lactic acidosis: likely resulted from hypoperfusion from severe hypovolemia, improved with IVF resuscitation. No source of infection at this time  Improved s/p IVF    *Leukocytosis-Resolved  No sxs infection   UA Neg.  CXR- No infiltrate, consolation    *Hypothermia: T 96.5 on admission-resolved     *CAD: history of stent in 2008, known to Dr. Fotoe, has cardiac cath in 2014 that showed nonobstructive CAD.   ECG NSR with PVCs  Cont ASA, Lipitor, Metoprolol    *HTN  Cont Metoprolol  Hold lisinopril due to MYNOR, if hypertensive, can add Norvasc as needed     *Hypothyroid  THS wnl   Continue Synthroid    *DVT ppx  UFH    *Advanced directives / goals of care   DNR/DNI-Comfort care/ limited abx & ivf as necessary/ no feeding tube/ no rehospitalization   MOLST filled and placed in chart  Palliative care consult placed / hospice eval placed     Dispo: Medically cleared for d/c to [facility]. 89yo female w. PMH CAD s/p stents (2008), HTN, HLD, Hypothyroidism, Adenocarcinoma of rectum s/p chemo and XRT, Arthritis brought in from home after son found her on bedroom floor.     *Possible syncope/fall: pt was found alert, but cannot recall what happened  r/o ACS  Tele Monitoring- w/o events d/c  EKG w/o ischemic changes  CT Head w/o acute infarct/bleed  TTE EF 45-50%, Mild decrease LV Fnx, Grade II diastolic dysfunction, Mod-Severe TR, Severe pHTN     *LLE Weakness, Paresis  Fall risk precautions  PT   L-Spine XR & CT LS Spine as above no fracture, multilevel spondylotic and degenerative disc changes , left lateral disc herniations noted at L1-2 through L4-5.  Appreciate Neuro recs  CT Pelvis- Unremarkable left iliopsoas musculature. Right retroperitoneal and flank fluid.  Repeat CT Head 8/25 s/p head trauma f/u w/o acute findings. Chronic microvascular ischemic changes.  MRI LS Spine to evaluate left lumbar radiculopathy,  and outpatient EMG/NCV study as outpt if in align w. pt's GOC     *MYNOR on CKD stage 3b  MYNOR improved w. IVF  No known renal disease, likely prerenal due to decreased PO intake as pt was on the floor for at least 24hrs.  Hold home Lisinopril  Avoid nephrotoxic meds    *Urinary retention  Straight cath prn   Monitor Bladder scans  If persistent will place Wolf   Renal u/s w. b/l Hydo   Consult    *Hypokalemia- resolved   s/p repletion    *Hypomagnesemia- resolved   s/p repletion     *Lactic acidosis: likely resulted from hypoperfusion from severe hypovolemia, improved with IVF resuscitation. No source of infection at this time  Improved s/p IVF    *Leukocytosis-Resolved  No sxs infection   UA Neg.  CXR- No infiltrate, consolation    *Hypothermia: T 96.5 on admission-resolved     *CAD: history of stent in 2008, known to Dr. Foote, has cardiac cath in 2014 that showed nonobstructive CAD.   ECG NSR with PVCs  Cont ASA, Lipitor, Metoprolol    *HTN  Cont Metoprolol  Hold lisinopril due to MYNOR, if hypertensive, can add Norvasc as needed     *Hypothyroid  THS wnl   Continue Synthroid    *DVT ppx  UFH    *Advanced directives / goals of care   DNR/DNI-Comfort care/ limited abx & ivf as necessary/ no feeding tube/ no rehospitalization   MOLST filled and placed in chart  Palliative care consult placed / hospice eval placed     Dispo:  d/c to [facility] when medically cleared.

## 2019-08-27 NOTE — PROGRESS NOTE ADULT - SUBJECTIVE AND OBJECTIVE BOX
Patient is a 89 year old woman who was admitted with left leg paresis. Patient's daughter states the patient was seen by family 2 days ago prior to admission on Thursday and was walking without difficulty. The morning of admission, Saturday, she was found on the floor in her bedroom, sitting against the radiator with weakness of the left leg. She was not able to lift the leg in the air. She is not certain of how she fell. She denies LOC. She denies weakness of the other extremities. She denies numbness, bowell or bladder complaints. She denies pain involving the low back, leg, She denies HA or other neurological complaints. Yesterday she noted left and right low back pain without radiation. Today, the left and right low back pain continues without radiation but relieved with tylenol. She states ambulating with PT with walker.    PMH:  CAD-S/P Stents-Dr. Foote           HTN, HLD-Dr. Polo-[facility]           Hypothyroidism           Renal CA-S/P RT and chemo 20 years ago            History of fall and pelvic fracture years ago not requiring surgery           S/P Hysterectomy 40 years ago    SH: Allergy to HCTZ, Levofloxacin, Cefuroxime        Smoking history 35 years ago. No history of ETOH abuse.        Lives alone.    [facility]: [place]            Breast CA    Exam: Awake, slowed mentation, appropriate           CN II-XII intact            Motor tone and strength -LUE-5/5                                                 LLE- , iliopsoas-3-/5, quad-4/5, hamstrings-5/5, dorsiflexion foot-5/5, plantar flexion foot-5/5                                                 RUE and RLE-5/5          Reflexes 2+ except 1+ ankle jerks

## 2019-08-27 NOTE — DISCHARGE NOTE NURSING/CASE MANAGEMENT/SOCIAL WORK - PATIENT PORTAL LINK FT
You can access the FollowMyHealth Patient Portal offered by [facility] by registering at the following website: [email or web address]. By joining [employer]’s FollowMyHealth portal, you will also be able to view your health information using other applications (apps) compatible with our system.

## 2019-08-27 NOTE — PROGRESS NOTE ADULT - ASSESSMENT
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  -syncope/fall : CT head negative for infarct or hemorrhage,  neurology consulted  for LLE weakness, PT evaluation   CT lumbar shows Left lateral disc herniations are noted at L1-2 through L4-5  - Neurology consult-  family not pursuing further testing at this time.   Pt oob in recliner, awake, alert, pleasant, no c/o pain this Am  reports tylenol efffective, cont. prn      Pt has GOC established, MOLST completed upon admission , DNR/DNI   Pt will be going to [facility] today,  family has hospice contact info. if needed when pt returns home.

## 2019-08-27 NOTE — PROGRESS NOTE ADULT - ATTENDING COMMENTS
I have personally seen and examined patient on the above date.  I discussed the case with Yuni Vora NP and I agree with findings and plan as detailed per note above, which I have amended where appropriate.    Syncope/Fall  - feels better and pain controlled on tylenol  - ambulatory dysfunction secondary to back pain  - for [facility] placement at [facility]    Urinary Retention with bilateral hydronephrosis  - urology consult  - straight cath PRN
I have personally seen and examined patient on the above date.  I discussed the case with Ashlee Escobar NP and I agree with findings and plan as detailed per note above, which I have amended where appropriate.
I have personally seen and examined patient on the above date.  I discussed the case with ALICE Vora and I agree with findings and plan as detailed per note above, which I have amended where appropriate.

## 2019-08-27 NOTE — PROGRESS NOTE ADULT - ASSESSMENT
Patient is a 89 year old woman who was admitted with left leg paresis. Patient's daughter states the patient was seen by family 2 days ago prior to admission on Thursday and was walking without difficulty. The morning of admission, Saturday, she was found on the floor in her bedroom, sitting against the radiator with weakness of the left leg. She was not able to lift the leg in the air. She is not certain of how she fell. She denies LOC. She denies weakness of the other extremities. She denies numbness, bowell or bladder complaints. She denies pain involving the low back, leg, She denies HA or other neurological complaints.  Patient's daughter the day of admission stated improvement in left leg weakness since arrived at the hospital as previously not able to elevate the left leg and then briefly able to lift the left leg.  She has noted left and right low back pain which  is relieved with tylenol. She has been ambulating with PT with walker.    1) CT Pelvis  unremarkable left iliopsoas musculature. Right retroperitoneal and flank fluid.  2) plain x-ray and CT LS Spine  no fracture, multilevel spondylotic and degenerative disc changes , left lateral disc herniations noted at L1-2 through L4-5  3) Repeat CT Head 8/25  . no hemorrhage, chronic microvascular ischemic changes.  4) Discussed with patient and patient's daughter consideration of MRI LS Spine to evaluate left lumbar radiculopathy,  and outpatient EMG/NCV study to evaluate left lumbar radiculopathy and femoral neuropathy Patient and patient's daughter as continues to improve would like to undergo  rehabilitation and not consider those studies at present.  5) Discharge pending to subacute rehabilitation.

## 2019-08-29 LAB
CULTURE RESULTS: SIGNIFICANT CHANGE UP
CULTURE RESULTS: SIGNIFICANT CHANGE UP
SPECIMEN SOURCE: SIGNIFICANT CHANGE UP
SPECIMEN SOURCE: SIGNIFICANT CHANGE UP

## 2019-09-29 ENCOUNTER — RX RENEWAL (OUTPATIENT)
Age: 84
End: 2019-09-29

## 2019-10-11 ENCOUNTER — APPOINTMENT (OUTPATIENT)
Dept: FAMILY MEDICINE | Facility: CLINIC | Age: 84
End: 2019-10-11
Payer: MEDICARE

## 2019-10-11 VITALS
OXYGEN SATURATION: 100 % | DIASTOLIC BLOOD PRESSURE: 70 MMHG | SYSTOLIC BLOOD PRESSURE: 110 MMHG | HEART RATE: 81 BPM | TEMPERATURE: 97.4 F | RESPIRATION RATE: 14 BRPM

## 2019-10-11 VITALS — WEIGHT: 110 LBS | BODY MASS INDEX: 20.12 KG/M2

## 2019-10-11 DIAGNOSIS — N28.1 CYST OF KIDNEY, ACQUIRED: ICD-10-CM

## 2019-10-11 DIAGNOSIS — R92.2 INCONCLUSIVE MAMMOGRAM: ICD-10-CM

## 2019-10-11 DIAGNOSIS — F51.04 PSYCHOPHYSIOLOGIC INSOMNIA: ICD-10-CM

## 2019-10-11 DIAGNOSIS — Z23 ENCOUNTER FOR IMMUNIZATION: ICD-10-CM

## 2019-10-11 PROCEDURE — 99214 OFFICE O/P EST MOD 30 MIN: CPT

## 2019-10-11 RX ORDER — ZOSTER VACCINE RECOMBINANT, ADJUVANTED 50 MCG/0.5
50 KIT INTRAMUSCULAR
Qty: 1 | Refills: 1 | Status: DISCONTINUED | COMMUNITY
Start: 2019-05-03 | End: 2019-10-11

## 2019-10-11 RX ORDER — IPRATROPIUM BROMIDE AND ALBUTEROL 20; 100 UG/1; UG/1
20-100 SPRAY, METERED RESPIRATORY (INHALATION)
Qty: 4 | Refills: 2 | Status: DISCONTINUED | COMMUNITY
Start: 2018-06-13 | End: 2019-10-11

## 2019-10-11 NOTE — HISTORY OF PRESENT ILLNESS
[Family Member] : family member [Other: _____] : [unfilled] [FreeTextEntry1] : hospital and rehab discharge f/u [de-identified] : 89 y/o PMHX HTN HLD hypothyroidism CAD s/p stent cath recent TTE EF; 45-50% Mod TR severe Pulm HTN  recently hospitalized found on the floor at home by son syncope/fall admitted to hospital r/o ACS weakness found to have 2.2 lesion lower pole left kidney had straight cath for urinary retention UTI. pt subsequently transferred to [facility] for rehabilitation now here for f/u. Pt now at home not back to baseline. Pt's car was sold not allow to drive since family worried after admission. Pt now here denies CP SOB palpitations dizziness  pt does still use walker since still uneasy on her feet getting home PT last time yesterday. Pt at home able to do ADLs needs assistance IADLs. pt now cooking and doing all foods needs assistance shopping\par \par Daughter report lost weight while in rehab and now gaining 107 and now 110

## 2019-10-11 NOTE — PHYSICAL EXAM
[Regular Rhythm] : with a regular rhythm [Normal S1, S2] : normal S1 and S2 [Normal] : soft, non-tender, non-distended, no masses palpated, no HSM and normal bowel sounds [No Spinal Tenderness] : no spinal tenderness [Coordination Grossly Intact] : coordination grossly intact [No CVA Tenderness] : no CVA  tenderness [de-identified] : 2+ edema both legs  [de-identified] : unstable gait uses roller walker

## 2019-10-11 NOTE — COUNSELING
[Good understanding] : Patient has a good understanding of disease, goals and obesity follow-up plan [Behavioral health counseling provided] : Behavioral health counseling provided

## 2019-10-11 NOTE — ASSESSMENT
[FreeTextEntry1] : spent time d/w daughter and mother finding anemia renal lesion left kidney 2.2 \par Does not want it investigated \par denies have f/u urology for urinary retention placed on Flomax still taking it\par had flu vaccine at [facility]\par

## 2019-10-24 ENCOUNTER — NON-APPOINTMENT (OUTPATIENT)
Age: 84
End: 2019-10-24

## 2019-10-24 ENCOUNTER — APPOINTMENT (OUTPATIENT)
Dept: CARDIOLOGY | Facility: CLINIC | Age: 84
End: 2019-10-24
Payer: MEDICARE

## 2019-10-24 VITALS
BODY MASS INDEX: 20.24 KG/M2 | SYSTOLIC BLOOD PRESSURE: 147 MMHG | RESPIRATION RATE: 16 BRPM | HEIGHT: 62 IN | WEIGHT: 110 LBS | HEART RATE: 79 BPM | DIASTOLIC BLOOD PRESSURE: 78 MMHG | OXYGEN SATURATION: 100 %

## 2019-10-24 VITALS — DIASTOLIC BLOOD PRESSURE: 76 MMHG | SYSTOLIC BLOOD PRESSURE: 136 MMHG

## 2019-10-24 PROCEDURE — 99214 OFFICE O/P EST MOD 30 MIN: CPT | Mod: 25

## 2019-10-24 PROCEDURE — 93000 ELECTROCARDIOGRAM COMPLETE: CPT

## 2019-10-24 NOTE — HISTORY OF PRESENT ILLNESS
[FreeTextEntry1] : Hospital no rehabilitation stay after a fall no syncope acute kidney injury and some hydronephrosis. Taken off of her ACE inhibitor. At home blood pressures have been fairly normal according to daughter denies chest pain shortness of breath palpitations.

## 2019-10-24 NOTE — REVIEW OF SYSTEMS
[Recent Weight Loss (___ Lbs)] : recent [unfilled] ~Ulb weight loss [Eyeglasses] : currently wearing eyeglasses [Loss Of Hearing] : hearing loss [Joint Stiffness] : joint stiffness [Joint Pain] : joint pain [see HPI] : see HPI [Negative] : Heme/Lymph [Feeling Fatigued] : not feeling fatigued [Cough] : no cough

## 2019-10-24 NOTE — DISCUSSION/SUMMARY
[Patient] : the patient [Risks] : risks [Benefits] : benefits [Alternatives] : alternatives [___ Month(s)] : [unfilled] month(s) [FreeTextEntry1] : Patient blood pressure at home normal she is asymptomatic has no evidence of heart failure is 89 years of age. Will reassess in 4-6 months consider for repeat echo and reassess medication none

## 2019-10-24 NOTE — REASON FOR VISIT
[Follow-Up - From Hospitalization] : follow-up of a recent hospitalization for [Family Member] : family member [FreeTextEntry1] : \par \par \par \par \par \par \par \par \par

## 2019-10-24 NOTE — PHYSICAL EXAM
[General Appearance - Well Developed] : well developed [Well Groomed] : well groomed [Normal Appearance] : normal appearance [No Deformities] : no deformities [General Appearance - In No Acute Distress] : no acute distress [General Appearance - Well Nourished] : well nourished [Normal Conjunctiva] : the conjunctiva exhibited no abnormalities [Eyelids - No Xanthelasma] : the eyelids demonstrated no xanthelasmas [No Oral Pallor] : no oral pallor [Normal Oral Mucosa] : normal oral mucosa [No Oral Cyanosis] : no oral cyanosis [Respiration, Rhythm And Depth] : normal respiratory rhythm and effort [Exaggerated Use Of Accessory Muscles For Inspiration] : no accessory muscle use [Auscultation Breath Sounds / Voice Sounds] : lungs were clear to auscultation bilaterally [Heart Sounds] : normal S1 and S2 [Murmurs] : no murmurs present [Heart Rate And Rhythm] : heart rate and rhythm were normal [Arterial Pulses Normal] : the arterial pulses were normal [Abdomen Soft] : soft [Edema] : no peripheral edema present [Abdomen Tenderness] : non-tender [Abdomen Mass (___ Cm)] : no abdominal mass palpated [Nail Clubbing] : no clubbing of the fingernails [Cyanosis, Localized] : no localized cyanosis [] : no ischemic changes [No Venous Stasis] : no venous stasis [Skin Color & Pigmentation] : normal skin color and pigmentation [Affect] : the affect was normal [No Skin Ulcers] : no skin ulcer [Mood] : the mood was normal [FreeTextEntry1] : No JVD. No bruits.

## 2019-10-24 NOTE — ASSESSMENT
[FreeTextEntry1] : 89-year-old woman with CAD, residual nonobstructive disease with last angiogram asymptomatic. Asymptomatic PVC. Hypertension.\par \par Status post hospital stay for fall. Reported MR and LV dysfunction with pulmonary hypertension

## 2019-10-29 ENCOUNTER — MEDICATION RENEWAL (OUTPATIENT)
Age: 84
End: 2019-10-29

## 2019-11-03 ENCOUNTER — RX RENEWAL (OUTPATIENT)
Age: 84
End: 2019-11-03

## 2019-12-04 ENCOUNTER — MEDICATION RENEWAL (OUTPATIENT)
Age: 84
End: 2019-12-04

## 2019-12-19 DIAGNOSIS — M62.81 MUSCLE WEAKNESS (GENERALIZED): ICD-10-CM

## 2020-02-26 ENCOUNTER — RX RENEWAL (OUTPATIENT)
Age: 85
End: 2020-02-26

## 2020-03-19 ENCOUNTER — RX RENEWAL (OUTPATIENT)
Age: 85
End: 2020-03-19

## 2020-04-06 ENCOUNTER — RX RENEWAL (OUTPATIENT)
Age: 85
End: 2020-04-06

## 2020-06-22 ENCOUNTER — APPOINTMENT (OUTPATIENT)
Dept: CARDIOLOGY | Facility: CLINIC | Age: 85
End: 2020-06-22
Payer: MEDICARE

## 2020-06-22 ENCOUNTER — NON-APPOINTMENT (OUTPATIENT)
Age: 85
End: 2020-06-22

## 2020-06-22 VITALS
WEIGHT: 128 LBS | HEIGHT: 62 IN | HEART RATE: 85 BPM | RESPIRATION RATE: 16 BRPM | OXYGEN SATURATION: 99 % | SYSTOLIC BLOOD PRESSURE: 138 MMHG | DIASTOLIC BLOOD PRESSURE: 80 MMHG | BODY MASS INDEX: 23.55 KG/M2

## 2020-06-22 PROCEDURE — 93306 TTE W/DOPPLER COMPLETE: CPT

## 2020-06-22 PROCEDURE — 93000 ELECTROCARDIOGRAM COMPLETE: CPT

## 2020-06-22 PROCEDURE — 99214 OFFICE O/P EST MOD 30 MIN: CPT

## 2020-06-22 NOTE — PHYSICAL EXAM
[Well Groomed] : well groomed [Normal Appearance] : normal appearance [General Appearance - Well Developed] : well developed [General Appearance - Well Nourished] : well nourished [No Deformities] : no deformities [Normal Conjunctiva] : the conjunctiva exhibited no abnormalities [General Appearance - In No Acute Distress] : no acute distress [Normal Oral Mucosa] : normal oral mucosa [Eyelids - No Xanthelasma] : the eyelids demonstrated no xanthelasmas [No Oral Pallor] : no oral pallor [No Oral Cyanosis] : no oral cyanosis [Respiration, Rhythm And Depth] : normal respiratory rhythm and effort [Exaggerated Use Of Accessory Muscles For Inspiration] : no accessory muscle use [Heart Rate And Rhythm] : heart rate and rhythm were normal [Auscultation Breath Sounds / Voice Sounds] : lungs were clear to auscultation bilaterally [Arterial Pulses Normal] : the arterial pulses were normal [Murmurs] : no murmurs present [Heart Sounds] : normal S1 and S2 [Abdomen Soft] : soft [Edema] : no peripheral edema present [Abdomen Tenderness] : non-tender [Abdomen Mass (___ Cm)] : no abdominal mass palpated [Nail Clubbing] : no clubbing of the fingernails [Skin Color & Pigmentation] : normal skin color and pigmentation [Cyanosis, Localized] : no localized cyanosis [] : no ischemic changes [No Venous Stasis] : no venous stasis [Affect] : the affect was normal [No Skin Ulcers] : no skin ulcer [Mood] : the mood was normal [FreeTextEntry1] : No JVD. No bruits.

## 2020-06-22 NOTE — DISCUSSION/SUMMARY
[Patient] : the patient [Risks] : risks [Benefits] : benefits [Alternatives] : alternatives [FreeTextEntry1] : discussed with patient seems to be doing well clinically acceptable asymptomatic. Has CAD and mitral regurgitation Will obtain echo next visit followup in about 6 months for ENT.

## 2020-06-22 NOTE — REVIEW OF SYSTEMS
[Recent Weight Loss (___ Lbs)] : recent [unfilled] ~Ulb weight loss [Eyeglasses] : currently wearing eyeglasses [Loss Of Hearing] : hearing loss [Joint Stiffness] : joint stiffness [Joint Pain] : joint pain [see HPI] : see HPI [Negative] : Endocrine [Feeling Fatigued] : not feeling fatigued [Cough] : no cough

## 2020-06-30 ENCOUNTER — RX RENEWAL (OUTPATIENT)
Age: 85
End: 2020-06-30

## 2020-09-12 ENCOUNTER — RX RENEWAL (OUTPATIENT)
Age: 85
End: 2020-09-12

## 2020-12-14 ENCOUNTER — APPOINTMENT (OUTPATIENT)
Dept: CARDIOLOGY | Facility: CLINIC | Age: 85
End: 2020-12-14
Payer: MEDICARE

## 2020-12-14 ENCOUNTER — NON-APPOINTMENT (OUTPATIENT)
Age: 85
End: 2020-12-14

## 2020-12-14 VITALS
RESPIRATION RATE: 16 BRPM | SYSTOLIC BLOOD PRESSURE: 151 MMHG | OXYGEN SATURATION: 98 % | HEIGHT: 62 IN | DIASTOLIC BLOOD PRESSURE: 86 MMHG | BODY MASS INDEX: 23.92 KG/M2 | HEART RATE: 88 BPM | WEIGHT: 130 LBS

## 2020-12-14 VITALS — SYSTOLIC BLOOD PRESSURE: 151 MMHG | DIASTOLIC BLOOD PRESSURE: 87 MMHG

## 2020-12-14 DIAGNOSIS — I34.0 NONRHEUMATIC MITRAL (VALVE) INSUFFICIENCY: ICD-10-CM

## 2020-12-14 PROCEDURE — 93306 TTE W/DOPPLER COMPLETE: CPT

## 2020-12-14 PROCEDURE — 99214 OFFICE O/P EST MOD 30 MIN: CPT

## 2020-12-14 PROCEDURE — 93000 ELECTROCARDIOGRAM COMPLETE: CPT

## 2020-12-14 NOTE — REVIEW OF SYSTEMS
[Feeling Fatigued] : not feeling fatigued [Recent Weight Loss (___ Lbs)] : no recent weight loss [Eyeglasses] : currently wearing eyeglasses [Loss Of Hearing] : hearing loss [Cough] : no cough [Joint Pain] : joint pain [Joint Stiffness] : joint stiffness [see HPI] : see HPI [Negative] : Heme/Lymph

## 2020-12-14 NOTE — PHYSICAL EXAM
[General Appearance - Well Developed] : well developed [Normal Appearance] : normal appearance [Well Groomed] : well groomed [General Appearance - Well Nourished] : well nourished [No Deformities] : no deformities [General Appearance - In No Acute Distress] : no acute distress [Normal Conjunctiva] : the conjunctiva exhibited no abnormalities [Eyelids - No Xanthelasma] : the eyelids demonstrated no xanthelasmas [FreeTextEntry1] : No JVD. No bruits. [Respiration, Rhythm And Depth] : normal respiratory rhythm and effort [Exaggerated Use Of Accessory Muscles For Inspiration] : no accessory muscle use [Auscultation Breath Sounds / Voice Sounds] : lungs were clear to auscultation bilaterally [Heart Rate And Rhythm] : heart rate and rhythm were normal [Heart Sounds] : normal S1 and S2 [Murmurs] : no murmurs present [Arterial Pulses Normal] : the arterial pulses were normal [Edema] : no peripheral edema present [Abdomen Soft] : soft [Abdomen Tenderness] : non-tender [Abdomen Mass (___ Cm)] : no abdominal mass palpated [Nail Clubbing] : no clubbing of the fingernails [Cyanosis, Localized] : no localized cyanosis [] : no ischemic changes [Skin Color & Pigmentation] : normal skin color and pigmentation [No Venous Stasis] : no venous stasis [No Skin Ulcers] : no skin ulcer [Affect] : the affect was normal [Mood] : the mood was normal

## 2020-12-14 NOTE — ASSESSMENT
[FreeTextEntry1] : echo reviewed findings similar to 6 months ago with MR TR and pulmonary hypertension

## 2020-12-14 NOTE — HISTORY OF PRESENT ILLNESS
[FreeTextEntry1] : followup and echo today regarding MR/ ASHD.\par \par Feels okay no chest pain dyspnea palpitations no edema

## 2020-12-14 NOTE — REASON FOR VISIT
[Coronary Artery Disease] : coronary artery disease [Mitral Regurgitation] : mitral regurgitation [FreeTextEntry1] : \par \par \par \par \par \par \par \par \par  [Other: _____] : [unfilled]

## 2020-12-14 NOTE — DISCUSSION/SUMMARY
[Patient] : the patient [Risks] : risks [Benefits] : benefits [Alternatives] : alternatives [___ Month(s)] : [unfilled] month(s) [FreeTextEntry1] : discussed with patient seems to be doing well clinically- asymptomatic. Has CAD and mitral regurgitation , elevated blood pressure. Given her age and asymptomatic status we'll continue to observe although discussed with patient and daughter possibility of invasive approach including mitral clip

## 2020-12-17 ENCOUNTER — RX RENEWAL (OUTPATIENT)
Age: 85
End: 2020-12-17

## 2021-03-01 NOTE — PATIENT PROFILE ADULT - FALL HARM RISK TYPE OF ASSESSMENT
Rounded on pt., skin cool and clammy. Blood glucose 159. Pt. Alert and responding appropriately. Brief clean and dry. Positioned for comfort. Safety measures in place.  [facility] admission

## 2021-03-05 ENCOUNTER — APPOINTMENT (OUTPATIENT)
Dept: FAMILY MEDICINE | Facility: CLINIC | Age: 86
End: 2021-03-05
Payer: MEDICARE

## 2021-03-05 ENCOUNTER — LABORATORY RESULT (OUTPATIENT)
Age: 86
End: 2021-03-05

## 2021-03-05 VITALS
SYSTOLIC BLOOD PRESSURE: 140 MMHG | RESPIRATION RATE: 14 BRPM | HEART RATE: 80 BPM | DIASTOLIC BLOOD PRESSURE: 90 MMHG | HEIGHT: 62 IN | TEMPERATURE: 95.5 F | WEIGHT: 129 LBS | BODY MASS INDEX: 23.74 KG/M2 | OXYGEN SATURATION: 99 %

## 2021-03-05 DIAGNOSIS — Z87.898 PERSONAL HISTORY OF OTHER SPECIFIED CONDITIONS: ICD-10-CM

## 2021-03-05 DIAGNOSIS — Z87.39 PERSONAL HISTORY OF OTHER DISEASES OF THE MUSCULOSKELETAL SYSTEM AND CONNECTIVE TISSUE: ICD-10-CM

## 2021-03-05 PROCEDURE — G0439: CPT

## 2021-03-05 NOTE — HEALTH RISK ASSESSMENT
[Very Good] : ~his/her~  mood as very good [No falls in past year] : Patient reported no falls in the past year [0] : 2) Feeling down, depressed, or hopeless: Not at all (0) [Patient declined mammogram] : Patient declined mammogram [Patient declined bone density test] : Patient declined bone density test [Patient declined colonoscopy] : Patient declined colonoscopy [HIV test declined] : HIV test declined [Hepatitis C test declined] : Hepatitis C test declined [None] : None [Alone] : lives alone [Fully functional (bathing, dressing, toileting, transferring, walking, feeding)] : Fully functional (bathing, dressing, toileting, transferring, walking, feeding) [Fully functional (using the telephone, shopping, preparing meals, housekeeping, doing laundry, using] : Fully functional and needs no help or supervision to perform IADLs (using the telephone, shopping, preparing meals, housekeeping, doing laundry, using transportation, managing medications and managing finances) [Reports changes in hearing] : Reports changes in hearing [Smoke Detector] : smoke detector [Carbon Monoxide Detector] : carbon monoxide detector [Safety elements used in home] : safety elements used in home [Seat Belt] :  uses seat belt [Name: ___] : Health Care Proxy's Name: [unfilled]  [I will adhere to the patient's wishes as expressed in the advance directive except as noted below.] : I will adhere to the patient's wishes as expressed in the advance directive except as noted below [] : No [de-identified] : no [de-identified] : stopped many years ago [[id number]] : 0 [Change in mental status noted] : No change in mental status noted [Sexually Active] : not sexually active [High Risk Behavior] : no high risk behavior [Reports changes in vision] : Reports no changes in vision [Reports changes in dental health] : Reports no changes in dental health [Guns at Home] : no guns at home [Sunscreen] : does not use sunscreen [de-identified] : 2 family home [place] daughter visits 4 times a week [de-identified] : hearing aids [de-identified] : full dentures [de-identified] : does not go on the sun [FreeTextEntry4] : will update information per daughter

## 2021-03-05 NOTE — PHYSICAL EXAM

## 2021-03-05 NOTE — HISTORY OF PRESENT ILLNESS
[FreeTextEntry1] : CPE [de-identified] : 89 y/o PMHX CAD HTN HLD COPD CKD Anemia here for annual CPE denies bowel  or bladder issues. pt lives by herself able to do all ADLs IADLs but now per daughter  Michaelle worried since mom is getting forgetful and as well at times believes the kid across the street knock on her window. Also her nephew ? stealing her peanut butter. Pt no h/o mental health issues does not have sleeps and eats well per daughter. At that time took her to her house x 2 days and pt was fine there but wanted to go back to her home. Pt now here at times does have little escape of urine and daughter also notes hearing is getting worse and is going to make apt for her with audiology for new hearing aids. Pt as well while at daughters home slipped and fell but did not sustain trauma bruising and denies pain

## 2021-03-06 LAB
25(OH)D3 SERPL-MCNC: 48.8 NG/ML
ALBUMIN SERPL ELPH-MCNC: 3.7 G/DL
ALP BLD-CCNC: 161 U/L
ALT SERPL-CCNC: 20 U/L
ANION GAP SERPL CALC-SCNC: 11 MMOL/L
AST SERPL-CCNC: 31 U/L
BASOPHILS # BLD AUTO: 0.08 K/UL
BASOPHILS NFR BLD AUTO: 1.2 %
BILIRUB SERPL-MCNC: 0.4 MG/DL
BUN SERPL-MCNC: 20 MG/DL
CALCIUM SERPL-MCNC: 9.7 MG/DL
CHLORIDE SERPL-SCNC: 106 MMOL/L
CHOLEST SERPL-MCNC: 175 MG/DL
CO2 SERPL-SCNC: 24 MMOL/L
CREAT SERPL-MCNC: 1.02 MG/DL
EOSINOPHIL # BLD AUTO: 0.27 K/UL
EOSINOPHIL NFR BLD AUTO: 4.2 %
ESTIMATED AVERAGE GLUCOSE: 114 MG/DL
FOLATE SERPL-MCNC: >20 NG/ML
GLUCOSE SERPL-MCNC: 108 MG/DL
HBA1C MFR BLD HPLC: 5.6 %
HCT VFR BLD CALC: 38.8 %
HDLC SERPL-MCNC: 64 MG/DL
HGB BLD-MCNC: 12.3 G/DL
IMM GRANULOCYTES NFR BLD AUTO: 0.2 %
LDLC SERPL CALC-MCNC: 94 MG/DL
LYMPHOCYTES # BLD AUTO: 2.03 K/UL
LYMPHOCYTES NFR BLD AUTO: 31.2 %
MAN DIFF?: NORMAL
MCHC RBC-ENTMCNC: 30.3 PG
MCHC RBC-ENTMCNC: 31.7 GM/DL
MCV RBC AUTO: 95.6 FL
MONOCYTES # BLD AUTO: 0.47 K/UL
MONOCYTES NFR BLD AUTO: 7.2 %
NEUTROPHILS # BLD AUTO: 3.64 K/UL
NEUTROPHILS NFR BLD AUTO: 56 %
NONHDLC SERPL-MCNC: 111 MG/DL
PLATELET # BLD AUTO: 284 K/UL
POTASSIUM SERPL-SCNC: 4.5 MMOL/L
PROT SERPL-MCNC: 6.9 G/DL
RBC # BLD: 4.06 M/UL
RBC # FLD: 15.3 %
SODIUM SERPL-SCNC: 141 MMOL/L
TRIGL SERPL-MCNC: 83 MG/DL
TSH SERPL-ACNC: 7.59 UIU/ML
VIT B12 SERPL-MCNC: 442 PG/ML
WBC # FLD AUTO: 6.5 K/UL

## 2021-03-30 ENCOUNTER — RX RENEWAL (OUTPATIENT)
Age: 86
End: 2021-03-30

## 2021-06-21 ENCOUNTER — APPOINTMENT (OUTPATIENT)
Dept: CARDIOLOGY | Facility: CLINIC | Age: 86
End: 2021-06-21

## 2021-07-26 ENCOUNTER — APPOINTMENT (OUTPATIENT)
Dept: CARDIOLOGY | Facility: CLINIC | Age: 86
End: 2021-07-26
Payer: MEDICARE

## 2021-07-26 ENCOUNTER — NON-APPOINTMENT (OUTPATIENT)
Age: 86
End: 2021-07-26

## 2021-07-26 VITALS
WEIGHT: 128 LBS | SYSTOLIC BLOOD PRESSURE: 139 MMHG | TEMPERATURE: 96.4 F | OXYGEN SATURATION: 97 % | HEIGHT: 62 IN | DIASTOLIC BLOOD PRESSURE: 82 MMHG | RESPIRATION RATE: 16 BRPM | HEART RATE: 78 BPM | BODY MASS INDEX: 23.55 KG/M2

## 2021-07-26 PROCEDURE — 99214 OFFICE O/P EST MOD 30 MIN: CPT

## 2021-07-26 PROCEDURE — 93000 ELECTROCARDIOGRAM COMPLETE: CPT

## 2021-07-26 NOTE — CARDIOLOGY SUMMARY
[de-identified] : July 26, 2021 sinus rhythm with PVCs [de-identified] : 2013 mild anterolateral ischemia [de-identified] : 2020 normal LV function mitral and tricuspid regurg [de-identified] : 2014 moderate nonobstructive disease 2008 LAD stent

## 2021-07-26 NOTE — ASSESSMENT
[FreeTextEntry1] : Elderly lady with history of CAD no angina or heart failure.  Clinically stable.  PVCs.  History of prior stenting.  Satisfactory LDL

## 2021-07-26 NOTE — DISCUSSION/SUMMARY
[Patient] : the patient [Risks] : risks [Alternatives] : alternatives [Benefits] : benefits [FreeTextEntry1] : Reviewed blood testing.  Would continue current cardiac medication.  Follow-up with PMD see me in 6 months.

## 2021-07-26 NOTE — HISTORY OF PRESENT ILLNESS
[FreeTextEntry1] : Follow-up visit history of ASHD and valvular disease.\par \par Says she feels well denies chest pain shortness of breath lightheadedness dizziness or edema.  Had recent blood testing

## 2021-07-27 ENCOUNTER — RX RENEWAL (OUTPATIENT)
Age: 86
End: 2021-07-27

## 2021-08-27 ENCOUNTER — INPATIENT (INPATIENT)
Facility: HOSPITAL | Age: 86
LOS: 2 days | Discharge: HOME HEALTH | DRG: 380 | End: 2021-08-30
Attending: INTERNAL MEDICINE | Admitting: STUDENT IN AN ORGANIZED HEALTH CARE EDUCATION/TRAINING PROGRAM
Payer: MEDICARE

## 2021-08-27 VITALS
HEART RATE: 113 BPM | OXYGEN SATURATION: 98 % | TEMPERATURE: 98 F | HEIGHT: 64 IN | WEIGHT: 119.93 LBS | DIASTOLIC BLOOD PRESSURE: 84 MMHG | SYSTOLIC BLOOD PRESSURE: 136 MMHG | RESPIRATION RATE: 22 BRPM

## 2021-08-27 DIAGNOSIS — K31.1 ADULT HYPERTROPHIC PYLORIC STENOSIS: ICD-10-CM

## 2021-08-27 DIAGNOSIS — Z90.710 ACQUIRED ABSENCE OF BOTH CERVIX AND UTERUS: Chronic | ICD-10-CM

## 2021-08-27 DIAGNOSIS — I25.10 ATHEROSCLEROTIC HEART DISEASE OF NATIVE CORONARY ARTERY WITHOUT ANGINA PECTORIS: ICD-10-CM

## 2021-08-27 DIAGNOSIS — F32.9 MAJOR DEPRESSIVE DISORDER, SINGLE EPISODE, UNSPECIFIED: ICD-10-CM

## 2021-08-27 DIAGNOSIS — I10 ESSENTIAL (PRIMARY) HYPERTENSION: ICD-10-CM

## 2021-08-27 DIAGNOSIS — E03.9 HYPOTHYROIDISM, UNSPECIFIED: ICD-10-CM

## 2021-08-27 DIAGNOSIS — E87.0 HYPEROSMOLALITY AND HYPERNATREMIA: ICD-10-CM

## 2021-08-27 DIAGNOSIS — K92.0 HEMATEMESIS: ICD-10-CM

## 2021-08-27 DIAGNOSIS — E78.5 HYPERLIPIDEMIA, UNSPECIFIED: ICD-10-CM

## 2021-08-27 DIAGNOSIS — Z98.89 OTHER SPECIFIED POSTPROCEDURAL STATES: Chronic | ICD-10-CM

## 2021-08-27 DIAGNOSIS — N17.9 ACUTE KIDNEY FAILURE, UNSPECIFIED: ICD-10-CM

## 2021-08-27 DIAGNOSIS — E86.0 DEHYDRATION: ICD-10-CM

## 2021-08-27 DIAGNOSIS — Z98.49 CATARACT EXTRACTION STATUS, UNSPECIFIED EYE: Chronic | ICD-10-CM

## 2021-08-27 DIAGNOSIS — K44.9 DIAPHRAGMATIC HERNIA WITHOUT OBSTRUCTION OR GANGRENE: ICD-10-CM

## 2021-08-27 DIAGNOSIS — R73.9 HYPERGLYCEMIA, UNSPECIFIED: ICD-10-CM

## 2021-08-27 LAB
ALBUMIN SERPL ELPH-MCNC: 3.7 G/DL — SIGNIFICANT CHANGE UP (ref 3.3–5)
ALP SERPL-CCNC: 132 U/L — HIGH (ref 40–120)
ALT FLD-CCNC: 26 U/L — SIGNIFICANT CHANGE UP (ref 10–45)
ANION GAP SERPL CALC-SCNC: 12 MMOL/L — SIGNIFICANT CHANGE UP (ref 5–17)
APPEARANCE UR: CLEAR — SIGNIFICANT CHANGE UP
AST SERPL-CCNC: 32 U/L — SIGNIFICANT CHANGE UP (ref 10–40)
BACTERIA # UR AUTO: ABNORMAL /HPF
BASOPHILS # BLD AUTO: 0.01 K/UL — SIGNIFICANT CHANGE UP (ref 0–0.2)
BASOPHILS NFR BLD AUTO: 0.1 % — SIGNIFICANT CHANGE UP (ref 0–2)
BILIRUB SERPL-MCNC: 0.8 MG/DL — SIGNIFICANT CHANGE UP (ref 0.2–1.2)
BILIRUB UR-MCNC: NEGATIVE — SIGNIFICANT CHANGE UP
BLD GP AB SCN SERPL QL: SIGNIFICANT CHANGE UP
BUN SERPL-MCNC: 30 MG/DL — HIGH (ref 7–23)
CALCIUM SERPL-MCNC: 9.7 MG/DL — SIGNIFICANT CHANGE UP (ref 8.4–10.5)
CHLORIDE SERPL-SCNC: 104 MMOL/L — SIGNIFICANT CHANGE UP (ref 96–108)
CO2 SERPL-SCNC: 30 MMOL/L — SIGNIFICANT CHANGE UP (ref 22–31)
COLOR SPEC: YELLOW — SIGNIFICANT CHANGE UP
CREAT SERPL-MCNC: 1.42 MG/DL — HIGH (ref 0.5–1.3)
DIFF PNL FLD: ABNORMAL
EOSINOPHIL # BLD AUTO: 0 K/UL — SIGNIFICANT CHANGE UP (ref 0–0.5)
EOSINOPHIL NFR BLD AUTO: 0 % — SIGNIFICANT CHANGE UP (ref 0–6)
EPI CELLS # UR: SIGNIFICANT CHANGE UP
GLUCOSE SERPL-MCNC: 226 MG/DL — HIGH (ref 70–99)
GLUCOSE UR QL: 50 MG/DL
HCT VFR BLD CALC: 38.9 % — SIGNIFICANT CHANGE UP (ref 34.5–45)
HGB BLD-MCNC: 13.1 G/DL — SIGNIFICANT CHANGE UP (ref 11.5–15.5)
IMM GRANULOCYTES NFR BLD AUTO: 0.4 % — SIGNIFICANT CHANGE UP (ref 0–1.5)
KETONES UR-MCNC: ABNORMAL
LEUKOCYTE ESTERASE UR-ACNC: ABNORMAL
LIDOCAIN IGE QN: 105 U/L — SIGNIFICANT CHANGE UP (ref 73–393)
LYMPHOCYTES # BLD AUTO: 0.76 K/UL — LOW (ref 1–3.3)
LYMPHOCYTES # BLD AUTO: 7.3 % — LOW (ref 13–44)
MCHC RBC-ENTMCNC: 30.5 PG — SIGNIFICANT CHANGE UP (ref 27–34)
MCHC RBC-ENTMCNC: 33.7 GM/DL — SIGNIFICANT CHANGE UP (ref 32–36)
MCV RBC AUTO: 90.5 FL — SIGNIFICANT CHANGE UP (ref 80–100)
MONOCYTES # BLD AUTO: 0.42 K/UL — SIGNIFICANT CHANGE UP (ref 0–0.9)
MONOCYTES NFR BLD AUTO: 4 % — SIGNIFICANT CHANGE UP (ref 2–14)
NEUTROPHILS # BLD AUTO: 9.17 K/UL — HIGH (ref 1.8–7.4)
NEUTROPHILS NFR BLD AUTO: 88.2 % — HIGH (ref 43–77)
NITRITE UR-MCNC: NEGATIVE — SIGNIFICANT CHANGE UP
NRBC # BLD: 0 /100 WBCS — SIGNIFICANT CHANGE UP (ref 0–0)
OB PNL STL: NEGATIVE — SIGNIFICANT CHANGE UP
PH UR: 8 — SIGNIFICANT CHANGE UP (ref 5–8)
PLATELET # BLD AUTO: 249 K/UL — SIGNIFICANT CHANGE UP (ref 150–400)
POTASSIUM SERPL-MCNC: 3.5 MMOL/L — SIGNIFICANT CHANGE UP (ref 3.5–5.3)
POTASSIUM SERPL-SCNC: 3.5 MMOL/L — SIGNIFICANT CHANGE UP (ref 3.5–5.3)
PROT SERPL-MCNC: 7.8 G/DL — SIGNIFICANT CHANGE UP (ref 6–8.3)
PROT UR-MCNC: 100
RBC # BLD: 4.3 M/UL — SIGNIFICANT CHANGE UP (ref 3.8–5.2)
RBC # FLD: 15.1 % — HIGH (ref 10.3–14.5)
RBC CASTS # UR COMP ASSIST: SIGNIFICANT CHANGE UP /HPF (ref 0–4)
SARS-COV-2 RNA SPEC QL NAA+PROBE: SIGNIFICANT CHANGE UP
SODIUM SERPL-SCNC: 146 MMOL/L — HIGH (ref 135–145)
SP GR SPEC: 1.01 — SIGNIFICANT CHANGE UP (ref 1.01–1.02)
TROPONIN I SERPL-MCNC: <.017 NG/ML — LOW (ref 0.02–0.06)
UROBILINOGEN FLD QL: 1
WBC # BLD: 10.4 K/UL — SIGNIFICANT CHANGE UP (ref 3.8–10.5)
WBC # FLD AUTO: 10.4 K/UL — SIGNIFICANT CHANGE UP (ref 3.8–10.5)
WBC UR QL: SIGNIFICANT CHANGE UP /HPF (ref 0–5)

## 2021-08-27 PROCEDURE — 93010 ELECTROCARDIOGRAM REPORT: CPT

## 2021-08-27 PROCEDURE — 99285 EMERGENCY DEPT VISIT HI MDM: CPT

## 2021-08-27 PROCEDURE — 99223 1ST HOSP IP/OBS HIGH 75: CPT

## 2021-08-27 PROCEDURE — G1004: CPT

## 2021-08-27 PROCEDURE — 71045 X-RAY EXAM CHEST 1 VIEW: CPT | Mod: 26

## 2021-08-27 PROCEDURE — 74176 CT ABD & PELVIS W/O CONTRAST: CPT | Mod: 26,MG

## 2021-08-27 RX ORDER — METOPROLOL TARTRATE 50 MG
25 TABLET ORAL
Refills: 0 | Status: DISCONTINUED | OUTPATIENT
Start: 2021-08-27 | End: 2021-08-30

## 2021-08-27 RX ORDER — ACETAMINOPHEN 500 MG
650 TABLET ORAL EVERY 6 HOURS
Refills: 0 | Status: DISCONTINUED | OUTPATIENT
Start: 2021-08-27 | End: 2021-08-30

## 2021-08-27 RX ORDER — PANTOPRAZOLE SODIUM 20 MG/1
8 TABLET, DELAYED RELEASE ORAL
Qty: 80 | Refills: 0 | Status: DISCONTINUED | OUTPATIENT
Start: 2021-08-27 | End: 2021-08-27

## 2021-08-27 RX ORDER — PANTOPRAZOLE SODIUM 20 MG/1
80 TABLET, DELAYED RELEASE ORAL ONCE
Refills: 0 | Status: COMPLETED | OUTPATIENT
Start: 2021-08-27 | End: 2021-08-27

## 2021-08-27 RX ORDER — ATORVASTATIN CALCIUM 80 MG/1
40 TABLET, FILM COATED ORAL AT BEDTIME
Refills: 0 | Status: DISCONTINUED | OUTPATIENT
Start: 2021-08-27 | End: 2021-08-30

## 2021-08-27 RX ORDER — LEVOTHYROXINE SODIUM 125 MCG
50 TABLET ORAL DAILY
Refills: 0 | Status: DISCONTINUED | OUTPATIENT
Start: 2021-08-27 | End: 2021-08-30

## 2021-08-27 RX ORDER — SODIUM CHLORIDE 9 MG/ML
1000 INJECTION INTRAMUSCULAR; INTRAVENOUS; SUBCUTANEOUS ONCE
Refills: 0 | Status: COMPLETED | OUTPATIENT
Start: 2021-08-27 | End: 2021-08-27

## 2021-08-27 RX ORDER — ONDANSETRON 8 MG/1
4 TABLET, FILM COATED ORAL ONCE
Refills: 0 | Status: COMPLETED | OUTPATIENT
Start: 2021-08-27 | End: 2021-08-27

## 2021-08-27 RX ORDER — MIRTAZAPINE 45 MG/1
7.5 TABLET, ORALLY DISINTEGRATING ORAL AT BEDTIME
Refills: 0 | Status: DISCONTINUED | OUTPATIENT
Start: 2021-08-27 | End: 2021-08-30

## 2021-08-27 RX ORDER — AMLODIPINE BESYLATE 2.5 MG/1
5 TABLET ORAL DAILY
Refills: 0 | Status: DISCONTINUED | OUTPATIENT
Start: 2021-08-27 | End: 2021-08-30

## 2021-08-27 RX ORDER — ASPIRIN/CALCIUM CARB/MAGNESIUM 324 MG
81 TABLET ORAL DAILY
Refills: 0 | Status: DISCONTINUED | OUTPATIENT
Start: 2021-08-27 | End: 2021-08-30

## 2021-08-27 RX ORDER — TAMSULOSIN HYDROCHLORIDE 0.4 MG/1
0.4 CAPSULE ORAL AT BEDTIME
Refills: 0 | Status: DISCONTINUED | OUTPATIENT
Start: 2021-08-27 | End: 2021-08-30

## 2021-08-27 RX ORDER — SODIUM CHLORIDE 9 MG/ML
1000 INJECTION, SOLUTION INTRAVENOUS
Refills: 0 | Status: DISCONTINUED | OUTPATIENT
Start: 2021-08-27 | End: 2021-08-28

## 2021-08-27 RX ORDER — LANOLIN ALCOHOL/MO/W.PET/CERES
3 CREAM (GRAM) TOPICAL AT BEDTIME
Refills: 0 | Status: DISCONTINUED | OUTPATIENT
Start: 2021-08-27 | End: 2021-08-30

## 2021-08-27 RX ORDER — PANTOPRAZOLE SODIUM 20 MG/1
40 TABLET, DELAYED RELEASE ORAL ONCE
Refills: 0 | Status: DISCONTINUED | OUTPATIENT
Start: 2021-08-27 | End: 2021-08-27

## 2021-08-27 RX ORDER — ONDANSETRON 8 MG/1
4 TABLET, FILM COATED ORAL EVERY 8 HOURS
Refills: 0 | Status: DISCONTINUED | OUTPATIENT
Start: 2021-08-27 | End: 2021-08-30

## 2021-08-27 RX ADMIN — ATORVASTATIN CALCIUM 40 MILLIGRAM(S): 80 TABLET, FILM COATED ORAL at 21:50

## 2021-08-27 RX ADMIN — Medication 25 MILLIGRAM(S): at 19:37

## 2021-08-27 RX ADMIN — SODIUM CHLORIDE 1000 MILLILITER(S): 9 INJECTION INTRAMUSCULAR; INTRAVENOUS; SUBCUTANEOUS at 14:20

## 2021-08-27 RX ADMIN — TAMSULOSIN HYDROCHLORIDE 0.4 MILLIGRAM(S): 0.4 CAPSULE ORAL at 21:51

## 2021-08-27 RX ADMIN — PANTOPRAZOLE SODIUM 80 MILLIGRAM(S): 20 TABLET, DELAYED RELEASE ORAL at 13:48

## 2021-08-27 RX ADMIN — PANTOPRAZOLE SODIUM 10 MG/HR: 20 TABLET, DELAYED RELEASE ORAL at 13:51

## 2021-08-27 RX ADMIN — MIRTAZAPINE 7.5 MILLIGRAM(S): 45 TABLET, ORALLY DISINTEGRATING ORAL at 21:51

## 2021-08-27 RX ADMIN — AMLODIPINE BESYLATE 5 MILLIGRAM(S): 2.5 TABLET ORAL at 21:50

## 2021-08-27 RX ADMIN — SODIUM CHLORIDE 100 MILLILITER(S): 9 INJECTION, SOLUTION INTRAVENOUS at 19:37

## 2021-08-27 RX ADMIN — SODIUM CHLORIDE 100 MILLILITER(S): 9 INJECTION, SOLUTION INTRAVENOUS at 21:50

## 2021-08-27 RX ADMIN — ONDANSETRON 4 MILLIGRAM(S): 8 TABLET, FILM COATED ORAL at 13:20

## 2021-08-27 RX ADMIN — PANTOPRAZOLE SODIUM 10 MG/HR: 20 TABLET, DELAYED RELEASE ORAL at 18:36

## 2021-08-27 RX ADMIN — PANTOPRAZOLE SODIUM 10 MG/HR: 20 TABLET, DELAYED RELEASE ORAL at 15:51

## 2021-08-27 RX ADMIN — SODIUM CHLORIDE 1000 MILLILITER(S): 9 INJECTION INTRAMUSCULAR; INTRAVENOUS; SUBCUTANEOUS at 13:20

## 2021-08-27 NOTE — H&P ADULT - NSHPLABSRESULTS_GEN_ALL_CORE
LABS:                        13.1   10.40 )-----------( 249      ( 27 Aug 2021 13:21 )             38.9     08-27    146<H>  |  104  |  30<H>  ----------------------------<  226<H>  3.5   |  30  |  1.42<H>    Ca    9.7      27 Aug 2021 13:21    TPro  7.8  /  Alb  3.7  /  TBili  0.8  /  DBili  x   /  AST  32  /  ALT  26  /  AlkPhos  132<H>  08-27         CAPILLARY BLOOD GLUCOSE    RADIOLOGY & ADDITIONAL TESTS:    < from: CT Abdomen and Pelvis No Cont (08.27.21 @ 14:38) >    IMPRESSION:  Distended stomach with an apparent transition point at the gastric antrum which is at the mouthof the large hiatal hernia, suggestive of a gastric outlet obstruction.    0.7 cm slightly hyperdense lesion in the left kidney; this may represent a hyperdense cyst or a solid malignant neoplasm. If clinically indicated, abdominal MR without and with IV contrast may be pursued for further evaluation.    < end of copied text >          Consultant(s) Notes Reviewed:  [x ] YES  [ ] NO  Care Discussed with Consultants/Other Providers [ x] YES  [ ] NO  discussed w/ ER provider and Dr. Carrasco from Gen Surg    Imaging Personally Reviewed:  [x ] YES  [ ] NO LABS:                        13.1   10.40 )-----------( 249      ( 27 Aug 2021 13:21 )             38.9     08-27    146<H>  |  104  |  30<H>  ----------------------------<  226<H>  3.5   |  30  |  1.42<H>    Ca    9.7      27 Aug 2021 13:21    TPro  7.8  /  Alb  3.7  /  TBili  0.8  /  DBili  x   /  AST  32  /  ALT  26  /  AlkPhos  132<H>  08-27         CAPILLARY BLOOD GLUCOSE    RADIOLOGY & ADDITIONAL TESTS:    < from: CT Abdomen and Pelvis No Cont (08.27.21 @ 14:38) >    IMPRESSION:  Distended stomach with an apparent transition point at the gastric antrum which is at the mouthof the large hiatal hernia, suggestive of a gastric outlet obstruction.    0.7 cm slightly hyperdense lesion in the left kidney; this may represent a hyperdense cyst or a solid malignant neoplasm. If clinically indicated, abdominal MR without and with IV contrast may be pursued for further evaluation.    < end of copied text >    Read ECG personally with St depression in II, unchanged from previous. Negative troponin no chest pain.       Consultant(s) Notes Reviewed:  [x ] YES  [ ] NO  Care Discussed with Consultants/Other Providers [ x] YES  [ ] NO  discussed w/ ER provider and Dr. Carrasco from Gen Surg    Imaging Personally Reviewed:  [x ] YES  [ ] NO

## 2021-08-27 NOTE — ED PROVIDER NOTE - PROGRESS NOTE DETAILS
Case discussed with Dr. Carrasco, surgery, for large hiatal hernia causing gastric outlet obstruction, recommend admission to medicine, will consult.

## 2021-08-27 NOTE — H&P ADULT - ASSESSMENT
*MYNOR on CKD stage 3b    *Hypothermia: T 96.5 on admission-resolved     *CAD: history of stent in 2008, known to Dr. Foote, has cardiac cath in 2014 that showed nonobstructive CAD.   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 Sx now resolved. Surgery consulted. Keeping NPO.     Also w/ coffee ground emesis, likely chary villalobos tear from retching.

## 2021-08-27 NOTE — H&P ADULT - PROBLEM SELECTOR PLAN 1
- nausea/vomiting now resolved w/ zofran. Cont PRN zofran.   - Surgery consult called, discussed w/ Dr. Carrasco  - keep NPO overnight, can have sips/chips  - cont to f/u surgery recs.

## 2021-08-27 NOTE — ED ADULT NURSE NOTE - NSIMPLEMENTINTERV_GEN_ALL_ED
Implemented All Fall with Harm Risk Interventions:  [place] to call system. Call bell, personal items and telephone within reach. Instruct patient to call for assistance. Room bathroom lighting operational. Non-slip footwear when patient is off stretcher. Physically safe environment: no spills, clutter or unnecessary equipment. Stretcher in lowest position, wheels locked, appropriate side rails in place. Provide visual cue, wrist band, yellow gown, etc. Monitor gait and stability. Monitor for mental status changes and reorient to person, place, and time. Review medications for side effects contributing to fall risk. Reinforce activity limits and safety measures with patient and family. Provide visual clues: red socks.

## 2021-08-27 NOTE — ED PROVIDER NOTE - OBJECTIVE STATEMENT
89 yo female with h/o htn, hld, hypothyroidism, + baby aspirin presents to the ED with her daughter c/o nausea and vomiting since last night. As per daughter, vomit is dark brown in color. no h/o abd surgery. Denies fever, chills, chest pain, sob, abd pain, diarrhea, last BM last night - no blood or black stool, urinary sxs, flank pain.    pmd: Dr. Polo

## 2021-08-27 NOTE — ED ADULT TRIAGE NOTE - BSA (M2)
[facility]  Office visit  Jesse Brink  1956  [phone]    VISIT DATE:  4/25/2019    PCP: Terri Barreto APRN  [address] [facility] [address]    CC:  Chief Complaint   Patient presents with   • Coronary Artery Disease   • Benign essential HTN       PROBLEM LIST:  1. Coronary artery disease  2. Transient ischemic attack with normal echocardiogram, carotid duplex, and CT scan of the brain by verbal report.  3. Benign hypertension.  4. History of tobacco, quitting 28 years ago.  5. Hyperglycemia, diet controlled.  6. Rheumatoid arthritis.  7. Obesity.  8. Family history of heart disease.  9. Surgical history:  a. Pilonidal cyst, 1977.  b. Benign fibroid removed, 1980.  c. Anal fistula repair, 1999 and 2008.    Previous cardiac studies and procedures  January 2019  Cardiac catheterization  · Multivessel obstructive coronary disease: Proximal RCA .  Mid LAD .  95% proximal first diagonal, 95% first obtuse marginal branch, 70-80% proximal second obtuse marginal branch.  · Mildly elevated LVEDP  · No aortic stenosis.  Echo   · Left ventricular wall thickness is consistent with mild concentric hypertrophy.  · Estimated EF appears to be in the range of 36 - 40%  · Left ventricular diastolic dysfunction (grade I a) consistent with impaired relaxation.  · The following left ventricular wall segments are hypokinetic: mid anterior, apical anterior, apical lateral, apical inferior, apical septal and apex hypokinetic.  · Regional contracility augments post PVC.  Carotid duplex  · Right internal carotid artery stenosis of 0-49%.  · Left internal carotid artery stenosis of 0-49%.    1. Coronary artery bypass graft x 5  -LIMA to LAD.    -Greater saphenous vein to RCA  -Greater saphenous vein to OM1  -Greater saphenous vein to OM2  -Greater saphenous vein to D1  2. Left atrial appendage ligation (35 mm Atricure clip placement)      ASSESSMENT:   Diagnosis Plan   1. CAD in native  "artery     2. Ischemic cardiomyopathy     3. Benign essential HTN     4. Mixed hyperlipidemia         PLAN:  Coronary artery disease: Status post surgical revascularization.  Continue cardiac rehab.  Repeat echo in 3 months.  Titrating afterload reduction.    Hypertension: Goal less than 130/90.  12.5 mg p.o. twice daily.  Keep home blood pressure log, will drop off at [place] office in 4 weeks for me to review.  Still not at goal, switching metoprolol to carvedilol.    Palpitations: Cnsistent with symptomatic premature ventricular contractions.   Continue beta-blockade.      Hyperlipidemia: Continue high intensity statin therapy, goal LDL less than 70.    History of stroke: Continue aggressive risk factor modification.  Continue current medical therapy.    Subjective  Gradually improving functional capacity.  Has started cardiac rehab and is progressing well.  Blood pressures intermittently running higher than 140/90 mmHg, reviewed cardiac rehab summary and home blood pressure log.  He is compliant with medical therapy.  Rhythm strips at cardiac rehab reveal PVCs in a trigeminal pattern.    PHYSICAL EXAMINATION:  Vitals:    04/25/19 0914   BP: 170/80   BP Location: Right arm   Patient Position: Sitting   Pulse: (!) 48   SpO2: 98%   Weight: 99.7 kg (219 lb 12.8 oz)   Height: 175.3 cm (69\")     General Appearance:    Alert, cooperative, no distress, appears stated age   Head:    Normocephalic, without obvious abnormality, atraumatic   Eyes:    conjunctiva/corneas clear   Nose:   Nares normal, septum midline, mucosa normal, no drainage   Throat:   Lips, teeth and gums normal   Neck:   Supple, symmetrical, trachea midline, no carotid    bruit or JVD   Lungs:     Clear to auscultation bilaterally, respirations unlabored   Chest Wall:    No tenderness or deformity    Heart:    Regular rate and rhythm, S1 and S2 normal, no murmur, rub   or gallop, normal carotid impulse bilaterally without bruit.   Abdomen:     Soft, " non-tender   Extremities:   Extremities normal, atraumatic, no cyanosis or edema   Pulses:   2+ and symmetric all extremities   Skin:   Skin color, texture, turgor normal, no rashes or lesions       Diagnostic Data:  Procedures  Lab Results   Component Value Date    TRIG 168 (H) 01/18/2019    HDL 29 (L) 01/18/2019     Lab Results   Component Value Date    GLUCOSE 115 (H) 02/05/2019    BUN 17 02/05/2019    CREATININE 0.97 02/05/2019     02/05/2019    K 4.3 02/05/2019     02/05/2019    CO2 31.0 02/05/2019     Lab Results   Component Value Date    HGBA1C 6.60 (H) 01/30/2019     Lab Results   Component Value Date    WBC 9.72 02/05/2019    HGB 13.0 (L) 02/05/2019    HCT 39.3 02/05/2019     02/05/2019       Allergies  Allergies   Allergen Reactions   • Humira [Adalimumab] Other (See Comments)     HA, intractable ocular migraine       Current Medications    Current Outpatient Medications:   •  amLODIPine (NORVASC) 5 MG tablet, Take 1 tablet by mouth every night at bedtime., Disp: 90 tablet, Rfl: 4  •  aspirin 325 MG EC tablet, Take 1 tablet by mouth Daily., Disp: 30 tablet, Rfl: 11  •  baclofen (LIORESAL) 10 MG tablet, Take 10 mg by mouth As Needed for Muscle Spasms., Disp: , Rfl:   •  Cholecalciferol (VITAMIN D3) 2000 UNITS capsule, Take 2,000 Units by mouth Daily., Disp: , Rfl:   •  Docusate Calcium (STOOL SOFTENER PO), Take 1 tablet by mouth 2 (Two) Times a Day., Disp: , Rfl:   •  Empagliflozin (JARDIANCE) 25 MG tablet, Take 25 mg by mouth Daily., Disp: , Rfl:   •  ENBREL SURECLICK 50 MG/ML solution auto-injector, Inject 50 mg under the skin into the appropriate area as directed 1 (One) Time Per Week., Disp: , Rfl:   •  gabapentin (NEURONTIN) 300 MG capsule, Take 300 mg by mouth 2 (Two) Times a Day., Disp: , Rfl:   •  losartan (COZAAR) 100 MG tablet, Take 1 tablet by mouth Daily., Disp: 135 tablet, Rfl: 1  •  potassium chloride (K-DUR) 10 MEQ CR tablet, Take 10 mEq by mouth Daily., Disp: , Rfl:   •   1.57 rosuvastatin (CRESTOR) 40 MG tablet, Take 1 tablet by mouth Daily., Disp: 90 tablet, Rfl: 1  •  sertraline (ZOLOFT) 50 MG tablet, Take 50 mg by mouth Daily., Disp: , Rfl:   •  carvedilol (COREG) 12.5 MG tablet, Take 1 tablet by mouth 2 (Two) Times a Day., Disp: 180 tablet, Rfl: 3  No current facility-administered medications for this visit.     Facility-Administered Medications Ordered in Other Visits:   •  Chlorhexidine Gluconate Cloth 2 % pads 1 application, 1 application, Topical, Q12H PRN, Marie Barnes PA-C ROS  Review of Systems   Cardiovascular: Positive for irregular heartbeat. Negative for chest pain, claudication, dyspnea on exertion and palpitations.   Respiratory: Negative for cough and shortness of breath.          SOCIAL HX  Social History     Socioeconomic History   • Marital status:      Spouse name: Not on file   • Number of children: 2   • Years of education: Not on file   • Highest education level: Not on file   Occupational History   • Occupation:      Comment: Retired   Tobacco Use   • Smoking status: Former Smoker     Packs/day: 1.00     Types: Cigarettes     Last attempt to quit: 3/5/1984     Years since quittin.1   • Smokeless tobacco: Never Used   Substance and Sexual Activity   • Alcohol use: Yes     Alcohol/week: 0.6 oz     Types: 1 Cans of beer per week     Comment: occas   • Drug use: No   • Sexual activity: Defer   Social History Narrative    Lives in [place].       FAMILY HX  Family History   Problem Relation Age of Onset   • Heart disease Other    • Diabetes Other    • Cancer Other    • Hypertension Other    • Heart disease Mother    • Diabetes Father    • Hypertension Father    • Cancer Father              Dillon Neville III, MD, [facility]

## 2021-08-27 NOTE — H&P ADULT - NSHPSOCIALHISTORY_GEN_ALL_CORE
pt denies tobacco, alcohol, and drug use. pt denies tobacco, alcohol, and drug use.  pt lives alone.

## 2021-08-27 NOTE — H&P ADULT - PROBLEM SELECTOR PLAN 10
Per previous documentation, stent placed in 2008. Cardiac cath in 2014 that showed nonobstructive CAD.   - cont aspirin and statin

## 2021-08-27 NOTE — ED PROVIDER NOTE - ATTENDING CONTRIBUTION TO CARE
Ricardo with [facility]. 67 yo male with h/o mesothelioma on home O2 as needed, HTN, HLD, preDM, abdominal ascites recently drained on Aug 19th (for the first time) presents to the ED c/o worsening abdominal distension, pain and SOB. Patient tried calling his doctor but was unable to get an appointment and was instructed to take tylenol for fever. Denies fever, chills, chest pain, diarrhea, urinary sxs, flank pain. PE: as above. A/P: ekg, labs, cxr, ct abd, ua/cx, fluids, meds, reassess.    Ct with finding of large hiatal hernia causing gastric outlet obstruction, recommend admission to medicine. Surgeon will consult. Dr Duvall saw pt.    I performed a face to face bedside interview with patient regarding history of present illness, review of symptoms and past medical history. I completed an independent physical exam.  I have discussed the patient's plan of care with Physician Assistant (PA). I agree with note as stated above, having amended the EMR as needed to reflect my findings.   This includes History of Present Illness, HIV, Past Medical/Surgical/Family/Social History, Allergies and Home Medications, Review of Systems, Physical Exam, and any Progress Notes during the time I functioned as the attending physician for this patient.

## 2021-08-27 NOTE — CONSULT NOTE ADULT - SUBJECTIVE AND OBJECTIVE BOX
This 89 year old woman, reportedly, was vomiting all night. This morning, her son vivited her and noticed dark fluid that she had recently vomited; she was brought to the ED this afternoon. A CTscan demonstrated a "large hiatal hernia with a dilated stomach and a probable obstructing area in the antrum. After receiving Zofran in the ED, the patient was no longer nauseated.      PAST MEDICAL & SURGICAL HISTORY:  HTN  HLD  Hypothyroid    S/P Rectal CA - chemo/RT only  Hysterectomy  2008 - angioplasty and stents    PFSH: All noncontributory    MEDICATIONS REVIEWED:acetaminophen   Tablet .. 650 milliGRAM(s) Oral every 6 hours PRN  aluminum hydroxide/magnesium hydroxide/simethicone Suspension 30 milliLiter(s) Oral every 4 hours PRN  amLODIPine   Tablet 5 milliGRAM(s) Oral daily  aspirin enteric coated 81 milliGRAM(s) Oral daily  atorvastatin 40 milliGRAM(s) Oral at bedtime  lactated ringers. 1000 milliLiter(s) IV Continuous <Continuous>  levothyroxine 50 MICROGram(s) Oral daily  melatonin 3 milliGRAM(s) Oral at bedtime PRN  metoprolol tartrate 25 milliGRAM(s) Oral two times a day  mirtazapine 7.5 milliGRAM(s) Oral at bedtime  ondansetron Injectable 4 milliGRAM(s) IV Push every 8 hours PRN  tamsulosin 0.4 milliGRAM(s) Oral at bedtime      ALLERGIES:cefuroxime (Dystonic RXN)  hydrochlorothiazide (Anaphylaxis; Angioedema)  levofloxacin (Flushing)  sulfamethoxazole (Unknown)      PHYSICAL EXAMINATION:  RESPIRATORY: Clear to auscultation bilaterally, respirations non-labored.  CARDIAC: RR, normal S1S2, no murmurs.  ABDOMEN: soft, BS present, no masses, no hernias, no peritoneal signs, no KLS, nontender.  VASCULAR: Equal and normal pulses.  MUSCULOSKELETAL: Full ROM, no deformities.      T(C): 36.8 (08-28-21 @ 05:20), Max: 37.7 (08-27-21 @ 20:09)  HR: 69 (08-28-21 @ 05:20) (69 - 113)  BP: 116/71 (08-28-21 @ 05:20) (115/72 - 150/75)  RR: 15 (08-28-21 @ 05:20) (15 - 22)  SpO2: 97% (08-28-21 @ 05:20) (93% - 98%)                          10.2   11.02 )-----------( 184      ( 28 Aug 2021 05:15 )             30.2       08-28    145  |  108  |  28<H>  ----------------------------<  101<H>  3.6   |  30  |  1.07    Ca    8.2<L>      28 Aug 2021 05:15    TPro  5.8<L>  /  Alb  2.7<L>  /  TBili  0.8  /  DBili  x   /  AST  28  /  ALT  21  /  AlkPhos  91  08-28

## 2021-08-27 NOTE — H&P ADULT - PROBLEM SELECTOR PLAN 2
seen on CT scan as likely cause of gastric outlet obstruction. likely chronic.   - Gen Surg consult as above.

## 2021-08-27 NOTE — CONSULT NOTE ADULT - TIME BILLING
Discussion with patient and son regarding all options and risks; all lab values and imaging studies reviewed; discussed with Medicine

## 2021-08-27 NOTE — H&P ADULT - NSHPPHYSICALEXAM_GEN_ALL_CORE
T(C): 36.6 (08-27-21 @ 12:42), Max: 36.6 (08-27-21 @ 12:42)  HR: 100 (08-27-21 @ 15:52) (100 - 113)  BP: 150/75 (08-27-21 @ 15:52) (136/84 - 150/75)  RR: 16 (08-27-21 @ 15:52) (16 - 22)  SpO2: 93% (08-27-21 @ 15:52) (93% - 98%)  Wt(kg): --Vital Signs Last 24 Hrs  T(C): 36.6 (27 Aug 2021 12:42), Max: 36.6 (27 Aug 2021 12:42)  T(F): 97.9 (27 Aug 2021 12:42), Max: 97.9 (27 Aug 2021 12:42)  HR: 100 (27 Aug 2021 15:52) (100 - 113)  BP: 150/75 (27 Aug 2021 15:52) (136/84 - 150/75)  BP(mean): --  RR: 16 (27 Aug 2021 15:52) (16 - 22)  SpO2: 93% (27 Aug 2021 15:52) (93% - 98%)    PHYSICAL EXAM:  GENERAL: NAD, well-groomed, well-developed  HEAD:  Atraumatic, Normocephalic  EYES: EOMI, PERRLA, conjunctiva and sclera clear  ENMT: No tonsillar erythema, exudates, or enlargement; Moist mucous membranes, Good dentition, No lesions  NECK: Supple, No JVD, Normal thyroid  NERVOUS SYSTEM:  Alert & Oriented X3, Good concentration; Motor Strength 5/5 B/L upper and lower extremities; DTRs 2+ intact and symmetric  CHEST/LUNG: Clear to percussion bilaterally; No rales, rhonchi, wheezing, or rubs  HEART: Regular rate and rhythm; No murmurs, rubs, or gallops  ABDOMEN: Soft, Nontender, Nondistended; Bowel sounds present  EXTREMITIES:  2+ Peripheral Pulses, No clubbing, cyanosis, or edema  LYMPH: No lymphadenopathy noted  SKIN: No rashes or lesions T(C): 36.6 (08-27-21 @ 12:42), Max: 36.6 (08-27-21 @ 12:42)  HR: 100 (08-27-21 @ 15:52) (100 - 113)  BP: 150/75 (08-27-21 @ 15:52) (136/84 - 150/75)  RR: 16 (08-27-21 @ 15:52) (16 - 22)  SpO2: 93% (08-27-21 @ 15:52) (93% - 98%)  Wt(kg): --Vital Signs Last 24 Hrs  T(C): 36.6 (27 Aug 2021 12:42), Max: 36.6 (27 Aug 2021 12:42)  T(F): 97.9 (27 Aug 2021 12:42), Max: 97.9 (27 Aug 2021 12:42)  HR: 100 (27 Aug 2021 15:52) (100 - 113)  BP: 150/75 (27 Aug 2021 15:52) (136/84 - 150/75)  BP(mean): --  RR: 16 (27 Aug 2021 15:52) (16 - 22)  SpO2: 93% (27 Aug 2021 15:52) (93% - 98%)    PHYSICAL EXAM:  GENERAL: elderly, appears younger than stated age. NAD, well-groomed, well-developed  HEAD:  Atraumatic, Normocephalic  EYES: EOMI, PERRLA, conjunctiva and sclera clear  ENMT: No tonsillar erythema, exudates, or enlargement; Moist mucous membranes, wearing false teeth, No lesions  NECK: Supple, No JVD, Normal thyroid  NERVOUS SYSTEM:  Alert & Oriented X3, Good concentration; Motor Strength 5/5 B/L upper and lower extremities; DTRs 2+ intact and symmetric  CHEST/LUNG: Clear to percussion bilaterally; No rales, rhonchi, wheezing, or rubs  HEART: Regular rate and rhythm; No murmurs, rubs, or gallops  ABDOMEN: Soft, Nontender on deep and light palpation, Nondistended; Bowel sounds present  EXTREMITIES:  capillary refill intact.  No clubbing, cyanosis, or edema  LYMPH: No lymphadenopathy noted  SKIN: No rashes or lesions

## 2021-08-27 NOTE — ED PROVIDER NOTE - GASTROINTESTINAL, MLM
Abdomen soft, non-tender, no guarding. no abd tenderness. no rebound or guarding. rectal: brown stool, no melena or gross blood.

## 2021-08-27 NOTE — H&P ADULT - PROBLEM SELECTOR PLAN 4
- likely prerenal in etiology from dehydration from vomiting.   - IVF overnight CKD st. IIIb  - likely prerenal in etiology from dehydration from vomiting.   - IVF overnight

## 2021-08-27 NOTE — ED PROVIDER NOTE - NEUROLOGICAL, MLM
Keep working on range of motion on left elbow.    If you cannot get it fully extended in the next few weeks, let me know and we can get an elbow x-ray.    Annual next visit.  
Alert and oriented, no focal deficits, no motor or sensory deficits.
supervision

## 2021-08-27 NOTE — H&P ADULT - HISTORY OF PRESENT ILLNESS
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   ER spoke to Dr. Carrasco who recommended admission to watch patient.  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 Pt is poor historian because she has a poor memory, so son is at bedside and helps provide history. He went over to hs mom's house this AM and noted that she had dark brown vomit, which he brought in to the hospital and looked like coffee grounds. Patient has been having normal bowel movements, no melena, no hematochezia. Denies urinary sx.     ER course:  CT abdomen performed which showed distended stomach, large hiatal hernia, suggestive of gastric outlet obstruction.  ER spoke to Dr. Carrasco who recommended admission to watch patient.   Patient's sx resolved w/ zofran and 1 L NS bolus.  Pt given IV 80 mg protonix and protonix infusion.

## 2021-08-27 NOTE — H&P ADULT - NSHPREVIEWOFSYSTEMS_GEN_ALL_CORE
const: no f/c  GI: + n/v/coffee ground emesis. no hematochezia or melena  chest: no CP or palpitations  Pulm: no SOB or cough  Neuro: no weakness. Poor memory.

## 2021-08-27 NOTE — PATIENT PROFILE ADULT - NSASFALLNEEDSASSIST_GEN_A_NUR
No current facility-administered medications for this visit. Data Reviewed:   CBC and Renal reviewed  Last CBC  Lab Results   Component Value Date    WBC 7.7 09/27/2019    RBC 4.34 09/27/2019    HGB 14.1 09/27/2019    MCV 97.6 09/27/2019     09/27/2019     Last Renal  Lab Results   Component Value Date     10/25/2019    K 5.2 10/25/2019    CL 98 10/25/2019    CO2 28 10/25/2019    CO2 27 09/27/2019    CO2 26 12/03/2018    BUN 19 10/25/2019    CREATININE 0.9 10/25/2019    GLUCOSE 191 10/25/2019    CALCIUM 9.5 10/25/2019       Last ABG  POC Blood Gas: No results found for: POCPH, POCPCO2, POCPO2, POCHCO3, NBEA, WVIU4TEV  No results for input(s): PH, PCO2, PO2, HCO3, BE, O2SAT in the last 72 hours. Radiology Review:  Pertinent images / reports were reviewed as a part of this visit. CT Chest w/ contrast: No results found for this or any previous visit. CT Chest w/o contrast: No results found for this or any previous visit. CTPA: No results found for this or any previous visit. CXR PA/LAT:   Results for orders placed in visit on 11/21/13   XR Chest Standard TWO VW    Narrative Site: [facility] [phone]  Rad #: [id number]  Unit #: [id number]  Location: [facility]  Account #: [de-identified]  Req #: [id number]  Order #: [id number]  Procedure: XR CHEST PA AND LATERAL  Exam Date/Time: 11/21/2013 08:00 AM  Admitting Diagnosis: s/p cabg, mild hypoxia  Reason for Exam: s/p cabg, mild hypoxia  Dictated by: Libia Sosa  ED    D: 11/21/2013 08:25 AM  T:  This document is confidential medical information. Unauthorized disclosure or   use of this information is prohibited by law. If you are not the intended recipient of this document, please advise us by   calling immediately [phone]. Impression/Conclusion below    FRONTAL AND LATERAL CHEST X-RAY:    CLINICAL INDICATION: s/p cabg, mild hypoxia    COMPARISON: November 13, 2013.     FINDINGS:    HEART: here is evidence of prior yes

## 2021-08-27 NOTE — H&P ADULT - PROBLEM SELECTOR PLAN 3
likely from constant retching causing chary villalobos tear. Fecal occult negative. no melena or hematochezia. hemoglobin wnl. Given one dose IV pantoprazole 80 mg in ER.   - probable self limited. Cont to monitor. discussed w/ son at bedside.  - f/u H/H this evening.

## 2021-08-27 NOTE — ED ADULT NURSE REASSESSMENT NOTE - NS ED NURSE REASSESS COMMENT FT1
Pt is alert, not in any pain or nausea. IVF infusing well. Vitals stable. Kept dry and clean. Meds given and is awaiting transport.

## 2021-08-27 NOTE — ED PROVIDER NOTE - IV ALTEPLASE ADMIN OUTSIDE HIDDEN
I will SWITCH the dose or number of times a day I take the medications listed below when I get home from the hospital:    Ecotrin Adult Low Strength 81 mg oral delayed release tablet  -- 1 tab(s) by mouth once a day show

## 2021-08-27 NOTE — ED PROVIDER NOTE - CLINICAL SUMMARY MEDICAL DECISION MAKING FREE TEXT BOX
69 yo male with h/o mesothelioma on home O2 as needed, HTN, HLD, preDM, abdominal ascites recently drained on Aug 19th (for the first time) presents to the ED c/o worsening abdominal distension, pain and SOB. Patient tried calling his doctor but was unable to get an appointment and was instructed to take tylenol for fever. Denies fever, chills, chest pain, diarrhea, urinary sxs, flank pain. PE: as above. A/P: ekg, labs, cxr, ct abd, ua/cx, fluids, meds, reassess. 67 yo male with h/o mesothelioma on home O2 as needed, HTN, HLD, preDM, abdominal ascites recently drained on Aug 19th (for the first time) presents to the ED c/o worsening abdominal distension, pain and SOB. Patient tried calling his doctor but was unable to get an appointment and was instructed to take tylenol for fever. Denies fever, chills, chest pain, diarrhea, urinary sxs, flank pain. PE: as above. A/P: ekg, labs, cxr, ct abd, ua/cx, fluids, meds, reassess.    Ct with finding of large hiatal hernia causing gastric outlet obstruction, recommend admission to medicine. Surgeon will consult. Dr Duvall saw pt.

## 2021-08-27 NOTE — CONSULT NOTE ADULT - ASSESSMENT
IMPRESSION: Hiatal hernia with antral obstruction - probably chronic - no evidence of ischemia    PLAN: As the patient is presently without nausea, vomiting, abdominal or chest pain, an NG tube will not inserted at this time. The patient will simply remain NPO until the morning. If the nausea returns, an NG will be inserted.           Hopefully, can avoid any invasive procedures and start PO fluids in the morning

## 2021-08-28 LAB
A1C WITH ESTIMATED AVERAGE GLUCOSE RESULT: 5.8 % — HIGH (ref 4–5.6)
ALBUMIN SERPL ELPH-MCNC: 2.7 G/DL — LOW (ref 3.3–5)
ALP SERPL-CCNC: 91 U/L — SIGNIFICANT CHANGE UP (ref 40–120)
ALT FLD-CCNC: 21 U/L — SIGNIFICANT CHANGE UP (ref 10–45)
ANION GAP SERPL CALC-SCNC: 7 MMOL/L — SIGNIFICANT CHANGE UP (ref 5–17)
AST SERPL-CCNC: 28 U/L — SIGNIFICANT CHANGE UP (ref 10–40)
BILIRUB SERPL-MCNC: 0.8 MG/DL — SIGNIFICANT CHANGE UP (ref 0.2–1.2)
BUN SERPL-MCNC: 28 MG/DL — HIGH (ref 7–23)
CALCIUM SERPL-MCNC: 8.2 MG/DL — LOW (ref 8.4–10.5)
CHLORIDE SERPL-SCNC: 108 MMOL/L — SIGNIFICANT CHANGE UP (ref 96–108)
CO2 SERPL-SCNC: 30 MMOL/L — SIGNIFICANT CHANGE UP (ref 22–31)
COVID-19 SPIKE DOMAIN AB INTERP: POSITIVE
COVID-19 SPIKE DOMAIN ANTIBODY RESULT: 117 U/ML — HIGH
CREAT SERPL-MCNC: 1.07 MG/DL — SIGNIFICANT CHANGE UP (ref 0.5–1.3)
ESTIMATED AVERAGE GLUCOSE: 120 MG/DL — HIGH (ref 68–114)
GLUCOSE SERPL-MCNC: 101 MG/DL — HIGH (ref 70–99)
HCT VFR BLD CALC: 30.2 % — LOW (ref 34.5–45)
HCT VFR BLD CALC: 33.1 % — LOW (ref 34.5–45)
HGB BLD-MCNC: 10.2 G/DL — LOW (ref 11.5–15.5)
HGB BLD-MCNC: 10.8 G/DL — LOW (ref 11.5–15.5)
MCHC RBC-ENTMCNC: 30.7 PG — SIGNIFICANT CHANGE UP (ref 27–34)
MCHC RBC-ENTMCNC: 33.8 GM/DL — SIGNIFICANT CHANGE UP (ref 32–36)
MCV RBC AUTO: 91 FL — SIGNIFICANT CHANGE UP (ref 80–100)
NRBC # BLD: 0 /100 WBCS — SIGNIFICANT CHANGE UP (ref 0–0)
PLATELET # BLD AUTO: 184 K/UL — SIGNIFICANT CHANGE UP (ref 150–400)
POTASSIUM SERPL-MCNC: 3.6 MMOL/L — SIGNIFICANT CHANGE UP (ref 3.5–5.3)
POTASSIUM SERPL-SCNC: 3.6 MMOL/L — SIGNIFICANT CHANGE UP (ref 3.5–5.3)
PROT SERPL-MCNC: 5.8 G/DL — LOW (ref 6–8.3)
RBC # BLD: 3.32 M/UL — LOW (ref 3.8–5.2)
RBC # FLD: 15.4 % — HIGH (ref 10.3–14.5)
SARS-COV-2 IGG+IGM SERPL QL IA: 117 U/ML — HIGH
SARS-COV-2 IGG+IGM SERPL QL IA: POSITIVE
SODIUM SERPL-SCNC: 145 MMOL/L — SIGNIFICANT CHANGE UP (ref 135–145)
WBC # BLD: 11.02 K/UL — HIGH (ref 3.8–10.5)
WBC # FLD AUTO: 11.02 K/UL — HIGH (ref 3.8–10.5)

## 2021-08-28 PROCEDURE — 99233 SBSQ HOSP IP/OBS HIGH 50: CPT

## 2021-08-28 RX ORDER — SODIUM CHLORIDE 9 MG/ML
1000 INJECTION, SOLUTION INTRAVENOUS
Refills: 0 | Status: DISCONTINUED | OUTPATIENT
Start: 2021-08-28 | End: 2021-08-30

## 2021-08-28 RX ORDER — PANTOPRAZOLE SODIUM 20 MG/1
40 TABLET, DELAYED RELEASE ORAL
Refills: 0 | Status: DISCONTINUED | OUTPATIENT
Start: 2021-08-28 | End: 2021-08-30

## 2021-08-28 RX ADMIN — Medication 25 MILLIGRAM(S): at 18:21

## 2021-08-28 RX ADMIN — PANTOPRAZOLE SODIUM 40 MILLIGRAM(S): 20 TABLET, DELAYED RELEASE ORAL at 18:21

## 2021-08-28 RX ADMIN — SODIUM CHLORIDE 75 MILLILITER(S): 9 INJECTION, SOLUTION INTRAVENOUS at 18:21

## 2021-08-28 RX ADMIN — MIRTAZAPINE 7.5 MILLIGRAM(S): 45 TABLET, ORALLY DISINTEGRATING ORAL at 21:46

## 2021-08-28 RX ADMIN — Medication 81 MILLIGRAM(S): at 13:56

## 2021-08-28 RX ADMIN — ATORVASTATIN CALCIUM 40 MILLIGRAM(S): 80 TABLET, FILM COATED ORAL at 21:45

## 2021-08-28 RX ADMIN — AMLODIPINE BESYLATE 5 MILLIGRAM(S): 2.5 TABLET ORAL at 06:46

## 2021-08-28 RX ADMIN — TAMSULOSIN HYDROCHLORIDE 0.4 MILLIGRAM(S): 0.4 CAPSULE ORAL at 21:46

## 2021-08-28 RX ADMIN — SODIUM CHLORIDE 75 MILLILITER(S): 9 INJECTION, SOLUTION INTRAVENOUS at 21:46

## 2021-08-28 RX ADMIN — Medication 50 MICROGRAM(S): at 06:31

## 2021-08-28 RX ADMIN — Medication 25 MILLIGRAM(S): at 06:31

## 2021-08-28 NOTE — PROGRESS NOTE ADULT - ASSESSMENT
IMPRESSION: Hiatal hernia with chronic, partial obstruction in antrum of stomach.                      DNR    PLAN: As the patient remains asymptomatic since admission yesterday, will begin clear fluid diet.           CXR IMPRESSION: Hiatal hernia with chronic (probably), partial obstruction in antrum of stomach.                          PLAN: As the patient remains asymptomatic since admission yesterday, will begin clear fluid diet.           Monitor daily CXR

## 2021-08-28 NOTE — PROGRESS NOTE ADULT - ASSESSMENT
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 Sx now resolved. Surgery consulted. Keeping NPO.     Also w/ coffee ground emesis, likely chary villalobos tear from retching.    Problem/Plan - 1:  ·  Problem: Gastric outlet obstruction.   ·  Plan: - nausea/vomiting now resolved w/ zofran. Cont PRN zofran.   - Surgery consult called, discussed w/ Dr. Carrasco  - Will start clear liquid diet today, advance as tolerates  - Will decrease IVF to 75ml/hr  - cont to f/u surgery recs.    Problem/Plan - 2:  ·  Problem: Hernia, hiatal.   ·  Plan: seen on CT scan as likely cause of gastric outlet obstruction. likely chronic.   - Gen Surg consult as above.    Problem/Plan - 3:  ·  Problem: Acute blood loss anemia with Coffee ground emesis, stable  ·  Plan: likely from constant retching causing chary villaloobs tear. Fecal occult negative. no melena or hematochezia. hemoglobin wnl. Given one dose IV pantoprazole 80 mg in ER.   - Will give protonix 40mg BID   - f/u H/H in AM    Problem/Plan - 4:  ·  Problem: Acute kidney injury superimposed on CKD, stable  ·  Plan: CKD st. IIIb  - likely prerenal in etiology from dehydration from vomiting.   - IVF x 24hrs    Problem/Plan - 5:  ·  Problem: Acute hypernatremia, resolved  ·  Plan: likely d/t dehydration  - f/u AM labs.    Problem/Plan - 6:  ·  Problem: Dehydration.   ·  Plan: due to vomiting and inability to tolerate PO.  - gentle IVF overnight.    Problem/Plan - 7:  ·  Problem: Hypothyroid.   ·  Plan: cont home levothyroxine 50 mcg.    Problem/Plan - 8:  ·  Problem: Depression.   ·  Plan: cont home mirtazapine.    Problem/Plan - 9:  ·  Problem: Hypertension.   ·  Plan: - cont home metoprolol w/ hold parameters.    Problem/Plan - 10:  ·  Problem: Coronary artery disease.   ·  Plan; Per previous documentation, stent placed in 2008. Cardiac cath in 2014 that showed nonobstructive CAD.   - cont aspirin and statin.    Problem/Plan - 11:  ·  Problem: Hyperlipidemia.   ·  Plan: cont atorvastatin 40 mg.    Problem/Plan - 12:  ·  Problem: Hyperglycemia, unspecified.   ·  Plan: A1C 5.8      Additional Information:  Additional Information: dvt ppx: hold lovenox d/t coffee ground. SCDs.   Code status: patient's son reports full code. Prior MOLST in chart but unable to find.  8/28: Spoke with son, Brayden [phone], all questions answered, in agreement with above.  D/c plan to home +/-home care    daughterMichaelle [phone]; [phone]  dispo: likely d/c 24-48hrs if sx are resolved and tolerating a diet.

## 2021-08-29 ENCOUNTER — TRANSCRIPTION ENCOUNTER (OUTPATIENT)
Age: 86
End: 2021-08-29

## 2021-08-29 LAB
ALBUMIN SERPL ELPH-MCNC: 2.7 G/DL — LOW (ref 3.3–5)
ALP SERPL-CCNC: 97 U/L — SIGNIFICANT CHANGE UP (ref 40–120)
ALT FLD-CCNC: 24 U/L — SIGNIFICANT CHANGE UP (ref 10–45)
ANION GAP SERPL CALC-SCNC: 4 MMOL/L — LOW (ref 5–17)
AST SERPL-CCNC: 35 U/L — SIGNIFICANT CHANGE UP (ref 10–40)
BASOPHILS # BLD AUTO: 0.04 K/UL — SIGNIFICANT CHANGE UP (ref 0–0.2)
BASOPHILS NFR BLD AUTO: 0.5 % — SIGNIFICANT CHANGE UP (ref 0–2)
BILIRUB SERPL-MCNC: 0.8 MG/DL — SIGNIFICANT CHANGE UP (ref 0.2–1.2)
BUN SERPL-MCNC: 20 MG/DL — SIGNIFICANT CHANGE UP (ref 7–23)
CALCIUM SERPL-MCNC: 8.2 MG/DL — LOW (ref 8.4–10.5)
CHLORIDE SERPL-SCNC: 107 MMOL/L — SIGNIFICANT CHANGE UP (ref 96–108)
CO2 SERPL-SCNC: 31 MMOL/L — SIGNIFICANT CHANGE UP (ref 22–31)
CREAT SERPL-MCNC: 1.12 MG/DL — SIGNIFICANT CHANGE UP (ref 0.5–1.3)
CULTURE RESULTS: SIGNIFICANT CHANGE UP
EOSINOPHIL # BLD AUTO: 0.24 K/UL — SIGNIFICANT CHANGE UP (ref 0–0.5)
EOSINOPHIL NFR BLD AUTO: 2.9 % — SIGNIFICANT CHANGE UP (ref 0–6)
GLUCOSE SERPL-MCNC: 104 MG/DL — HIGH (ref 70–99)
HCT VFR BLD CALC: 35.1 % — SIGNIFICANT CHANGE UP (ref 34.5–45)
HGB BLD-MCNC: 11.6 G/DL — SIGNIFICANT CHANGE UP (ref 11.5–15.5)
IMM GRANULOCYTES NFR BLD AUTO: 0.1 % — SIGNIFICANT CHANGE UP (ref 0–1.5)
LYMPHOCYTES # BLD AUTO: 1.81 K/UL — SIGNIFICANT CHANGE UP (ref 1–3.3)
LYMPHOCYTES # BLD AUTO: 21.6 % — SIGNIFICANT CHANGE UP (ref 13–44)
MAGNESIUM SERPL-MCNC: 1.5 MG/DL — LOW (ref 1.6–2.6)
MCHC RBC-ENTMCNC: 29.7 PG — SIGNIFICANT CHANGE UP (ref 27–34)
MCHC RBC-ENTMCNC: 33 GM/DL — SIGNIFICANT CHANGE UP (ref 32–36)
MCV RBC AUTO: 90 FL — SIGNIFICANT CHANGE UP (ref 80–100)
MONOCYTES # BLD AUTO: 0.56 K/UL — SIGNIFICANT CHANGE UP (ref 0–0.9)
MONOCYTES NFR BLD AUTO: 6.7 % — SIGNIFICANT CHANGE UP (ref 2–14)
NEUTROPHILS # BLD AUTO: 5.73 K/UL — SIGNIFICANT CHANGE UP (ref 1.8–7.4)
NEUTROPHILS NFR BLD AUTO: 68.2 % — SIGNIFICANT CHANGE UP (ref 43–77)
NRBC # BLD: 0 /100 WBCS — SIGNIFICANT CHANGE UP (ref 0–0)
PLATELET # BLD AUTO: 186 K/UL — SIGNIFICANT CHANGE UP (ref 150–400)
POTASSIUM SERPL-MCNC: 3.3 MMOL/L — LOW (ref 3.5–5.3)
POTASSIUM SERPL-SCNC: 3.3 MMOL/L — LOW (ref 3.5–5.3)
PROT SERPL-MCNC: 5.8 G/DL — LOW (ref 6–8.3)
RBC # BLD: 3.9 M/UL — SIGNIFICANT CHANGE UP (ref 3.8–5.2)
RBC # FLD: 14.6 % — HIGH (ref 10.3–14.5)
SODIUM SERPL-SCNC: 142 MMOL/L — SIGNIFICANT CHANGE UP (ref 135–145)
SPECIMEN SOURCE: SIGNIFICANT CHANGE UP
WBC # BLD: 8.39 K/UL — SIGNIFICANT CHANGE UP (ref 3.8–10.5)
WBC # FLD AUTO: 8.39 K/UL — SIGNIFICANT CHANGE UP (ref 3.8–10.5)

## 2021-08-29 PROCEDURE — 99232 SBSQ HOSP IP/OBS MODERATE 35: CPT

## 2021-08-29 PROCEDURE — 71045 X-RAY EXAM CHEST 1 VIEW: CPT | Mod: 26

## 2021-08-29 PROCEDURE — 99233 SBSQ HOSP IP/OBS HIGH 50: CPT

## 2021-08-29 RX ORDER — POTASSIUM CHLORIDE 20 MEQ
40 PACKET (EA) ORAL EVERY 4 HOURS
Refills: 0 | Status: COMPLETED | OUTPATIENT
Start: 2021-08-29 | End: 2021-08-29

## 2021-08-29 RX ORDER — MAGNESIUM SULFATE 500 MG/ML
2 VIAL (ML) INJECTION ONCE
Refills: 0 | Status: COMPLETED | OUTPATIENT
Start: 2021-08-29 | End: 2021-08-29

## 2021-08-29 RX ADMIN — Medication 40 MILLIEQUIVALENT(S): at 11:54

## 2021-08-29 RX ADMIN — TAMSULOSIN HYDROCHLORIDE 0.4 MILLIGRAM(S): 0.4 CAPSULE ORAL at 21:03

## 2021-08-29 RX ADMIN — Medication 50 GRAM(S): at 11:55

## 2021-08-29 RX ADMIN — SODIUM CHLORIDE 75 MILLILITER(S): 9 INJECTION, SOLUTION INTRAVENOUS at 21:04

## 2021-08-29 RX ADMIN — PANTOPRAZOLE SODIUM 40 MILLIGRAM(S): 20 TABLET, DELAYED RELEASE ORAL at 17:20

## 2021-08-29 RX ADMIN — ATORVASTATIN CALCIUM 40 MILLIGRAM(S): 80 TABLET, FILM COATED ORAL at 21:04

## 2021-08-29 RX ADMIN — Medication 50 MICROGRAM(S): at 05:18

## 2021-08-29 RX ADMIN — Medication 25 MILLIGRAM(S): at 17:19

## 2021-08-29 RX ADMIN — PANTOPRAZOLE SODIUM 40 MILLIGRAM(S): 20 TABLET, DELAYED RELEASE ORAL at 05:19

## 2021-08-29 RX ADMIN — SODIUM CHLORIDE 75 MILLILITER(S): 9 INJECTION, SOLUTION INTRAVENOUS at 11:55

## 2021-08-29 RX ADMIN — Medication 40 MILLIEQUIVALENT(S): at 17:19

## 2021-08-29 RX ADMIN — Medication 81 MILLIGRAM(S): at 11:56

## 2021-08-29 RX ADMIN — MIRTAZAPINE 7.5 MILLIGRAM(S): 45 TABLET, ORALLY DISINTEGRATING ORAL at 21:04

## 2021-08-29 RX ADMIN — Medication 25 MILLIGRAM(S): at 05:18

## 2021-08-29 RX ADMIN — AMLODIPINE BESYLATE 5 MILLIGRAM(S): 2.5 TABLET ORAL at 05:19

## 2021-08-29 NOTE — DISCHARGE NOTE PROVIDER - CARE PROVIDER_API CALL
Grant Carrasco)  ColonRectal Surgery; Surgery  [address], [address]  [place], [place] [id number]  Phone: [phone]  Fax: [phone]  Follow Up Time:     Bing Polo)  [address] [address]  Phone: [phone]  Fax: [phone]  Follow Up Time:

## 2021-08-29 NOTE — DISCHARGE NOTE PROVIDER - NSFTFHOMEHTHYNRD_GEN_ALL_CORE
Left pt a message informing them of their results as stated below. Left call back number if they have any further questions.    Yes

## 2021-08-29 NOTE — PROGRESS NOTE ADULT - ASSESSMENT
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 Sx now resolved. Surgery consulted. Keeping NPO.     Also w/ coffee ground emesis, likely chary villalobos tear from retching.    Problem/Plan - 1:  ·  Problem: Gastric outlet obstruction with hx of hiatal hernia, improved  ·  Plan: - nausea/vomiting now resolved w/ zofran. Cont PRN zofran.   - Surgery consult called, discussed w/ Dr. Carrasco  - Will start full liquid diet today with ensure clear TID, advance as tolerates  - C/w IVF to 75ml/hr for 24hrs  - cont to f/u surgery recs.    Problem/Plan - 2:  ·  Problem: Hernia, hiatal.   ·  Plan: seen on CT scan as likely cause of gastric outlet obstruction. likely chronic.   - Gen Surg consult as above.    Problem/Plan - 3:  ·  Problem: Acute blood loss anemia with Coffee ground emesis, stable  ·  Plan: likely from constant retching causing chary villalobos tear. Fecal occult negative. no melena or hematochezia. hemoglobin wnl. Given one dose IV pantoprazole 80 mg in ER.   - Will give protonix 40mg BID   - f/u H/H in AM    Problem/Plan - 4:  ·  Problem: Acute kidney injury superimposed on CKD, stable  ·  Plan: CKD st. IIIb  - likely prerenal in etiology from dehydration from vomiting.   - IVF x 24hrs    Problem/Plan - 5:  ·  Problem: Acute hypernatremia, hypokalemia, hypomagnesemia  ·  Plan: Will replace K and Mg  - f/u AM labs.    Problem/Plan - 6:  ·  Problem: Dehydration.   ·  Plan: due to vomiting and inability to tolerate PO.  - gentle IVF x 24hrs    Problem/Plan - 7:  ·  Problem: Hypothyroid.   ·  Plan: cont home levothyroxine 50 mcg.    Problem/Plan - 8:  ·  Problem: Depression.   ·  Plan: cont home mirtazapine.    Problem/Plan - 9:  ·  Problem: Hypertension.   ·  Plan: - cont home metoprolol w/ hold parameters.    Problem/Plan - 10:  ·  Problem: Coronary artery disease.   ·  Plan; Per previous documentation, stent placed in 2008. Cardiac cath in 2014 that showed nonobstructive CAD.   - cont aspirin and statin.    Problem/Plan - 11:  ·  Problem: Hyperlipidemia.   ·  Plan: cont atorvastatin 40 mg.    Problem/Plan - 12:  ·  Problem: Hyperglycemia, unspecified.   ·  Plan: A1C 5.8      Additional Information:  Additional Information: dvt ppx: hold lovenox d/t coffee ground. SCDs.   Code status: patient's son reports full code. Prior MOLST in chart but unable to find.  8/29: Spoke with son, Brayden [phone], all questions answered, in agreement with above.  DISP: D/c plan to home +/-home care in AM    daughter, Michaelle [phone]; [phone]

## 2021-08-29 NOTE — DISCHARGE NOTE PROVIDER - NSDCMRMEDTOKEN_GEN_ALL_CORE_FT
Actamin 325 mg oral tablet: 2 tab(s) orally every 6 hours, As needed, Temp greater or equal to 38C (100.4F), Mild Pain (1 - 3), Moderate Pain (4 - 6)  amLODIPine 5 mg oral tablet: 1 tab(s) orally once a day  atorvastatin 40 mg oral tablet: 1 tab(s) orally once a day (at bedtime)  Ecotrin 81 mg oral delayed release tablet: 1 tab(s) orally once a day  levothyroxine 50 mcg (0.05 mg) oral tablet: 1 tab(s) orally once a day  Metoprolol Tartrate 25 mg oral tablet: 1 tab(s) orally 2 times a day  mirtazapine 7.5 mg oral tablet: 2 tab(s) orally once a day (at bedtime)  tamsulosin 0.4 mg oral capsule: 1 cap(s) orally once a day (at bedtime)

## 2021-08-29 NOTE — PHYSICAL THERAPY INITIAL EVALUATION ADULT - BED MOBILITY TRAINING, PT EVAL
In 2-3 sessions, patient will pef In 2-3 sessions, patient will perform bed mobility with independence

## 2021-08-29 NOTE — DISCHARGE NOTE PROVIDER - HOSPITAL COURSE
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 Pt is poor historian because she has a poor memory, so son is at bedside and helps provide history. He went over to hs mom's house this AM and noted that she had dark brown vomit, which he brought in to the hospital and looked like coffee grounds. Patient has been having normal bowel movements, no melena, no hematochezia. Denies urinary sx.     ER course:  CT abdomen performed which showed distended stomach, large hiatal hernia, suggestive of gastric outlet obstruction.  ER spoke to Dr. Carrasco who recommended admission to watch patient.   Patient's sx resolved w/ zofran and 1 L NS bolus.  Pt given IV 80 mg protonix and protonix infusion.     Patient admitted to medicine.  Surgery consulted.  She was kept NPO initially and given IVF, zofran prn.  Her symptoms improved gradually.  She was started on a clear liquid diet and slowly advanced to solid foods in addition to ensure.  She is stable for discharge at this time.  She needs to f/u with PCP and surgery.      You were admitted for gastric outlet obstruction  You will need to follow up with your primary care physician.    Discharging Provider:  Benoit Kapoor MD  Contact Info: Cell [phone] - Please call with any questions or concerns.    Outpatient Provider: Dr Polo

## 2021-08-29 NOTE — PHYSICAL THERAPY INITIAL EVALUATION ADULT - ADDITIONAL COMMENTS
Patient lives in a 2 family house, has 3 JANETTE with handrails. Patient's nephew lives next door and has assistance from family for IADLs. Prior to this event, patient was independent with transfers and ambulation using SAC.

## 2021-08-29 NOTE — PROGRESS NOTE ADULT - ASSESSMENT
IMPRESSION: Hiatal hernia with probable, chronic gastric outlet obstruction     PLAN: Advance to full fluids PO with Ensure clear           If diet tolerated, advance to solids in am and possibly discharge later in the day

## 2021-08-29 NOTE — DISCHARGE NOTE PROVIDER - CARE PROVIDERS DIRECT ADDRESSES
[email or web address]

## 2021-08-29 NOTE — PHYSICAL THERAPY INITIAL EVALUATION ADULT - PERTINENT HX OF CURRENT PROBLEM, REHAB EVAL
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

## 2021-08-29 NOTE — DISCHARGE NOTE PROVIDER - NSDCHHBASESERVICE_GEN_ALL_CORE
Emmanuel is a 72y old  Male who presents with a chief complaint of lethargy (30 Jan 2020 13:49)      HPI: History from his wife- reliability excellent; although he has been noted to doze off during the day at home prior to hospitalization, he has not been as he appears now in the hospital, sleeping throughout the day and unable to remain awake long enough to answer questions clearly. His wife sleeps mostly in another room so does not know if he stops breathing but has noted moments during his sleep that he appears to awaken very short of breath, gasping for breath, without attempting to sit up, then gradually improving.     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   BPH (benign prostatic hyperplasia)  Depression  Hyperlipidemia  CVA (cerebral vascular accident)  Diabetes  No significant past surgical history      FAMILY HISTORY:  No pertinent family history in first degree relatives        Social Hx:  Nonsmoker, no drug or alcohol use    MEDICATIONS  (STANDING):  albuterol/ipratropium for Nebulization 3 milliLiter(s) Nebulizer every 6 hours  aspirin enteric coated 81 milliGRAM(s) Oral daily  atorvastatin 80 milliGRAM(s) Oral at bedtime  cholecalciferol 400 Unit(s) Oral daily  clopidogrel Tablet 75 milliGRAM(s) Oral daily  collagenase Ointment 1 Application(s) Topical daily  dextrose 5%. 1000 milliLiter(s) (50 mL/Hr) IV Continuous <Continuous>  donepezil 10 milliGRAM(s) Oral at bedtime  enalapril 10 milliGRAM(s) Oral daily  famotidine    Tablet 20 milliGRAM(s) Oral daily  finasteride 5 milliGRAM(s) Oral daily  gabapentin 300 milliGRAM(s) Oral two times a day  heparin  Injectable 5000 Unit(s) SubCutaneous every 8 hours  insulin glargine Injectable (LANTUS) 4 Unit(s) SubCutaneous at bedtime  insulin lispro (HumaLOG) corrective regimen sliding scale   SubCutaneous three times a day before meals  iron sucrose IVPB 200 milliGRAM(s) IV Intermittent every 24 hours  metoprolol tartrate 25 milliGRAM(s) Oral two times a day  tamsulosin 0.8 milliGRAM(s) Oral at bedtime       Allergies    No Known Allergies    Intolerances        ROS: Pertinent positives in HPI, all other ROS were reviewed and are negative.      Vital Signs Last 24 Hrs    T(C): 36.5 (01-30-20 @ 05:15), Max: 37.3 (01-29-20 @ 19:58)  HR: 63 (01-30-20 @ 09:03) (60 - 77)  BP: 129/73 (01-30-20 @ 05:15) (102/50 - 129/73)  RR: 18 (01-30-20 @ 05:15) (17 - 18)  SpO2: 98% (01-30-20 @ 09:03) (98% - 100%)  Wt(kg): --Vital Signs Last 24 Hrs  T(C): 36.5 (30 Jan 2020 05:15), Max: 37.3 (29 Jan 2020 19:58)  T(F): 97.7 (30 Jan 2020 05:15), Max: 99.2 (29 Jan 2020 19:58)  HR: 63 (30 Jan 2020 09:03) (60 - 77)  BP: 129/73 (30 Jan 2020 05:15) (102/50 - 129/73)  BP(mean): --  RR: 18 (30 Jan 2020 05:15) (17 - 18)  SpO2: 98% (30 Jan 2020 09:03) (98% - 100%)      Constitutional: awake and alert.  HEENT: PERRLA, EOMI,   Neck: Supple.  Respiratory: Breath sounds are clear bilaterally  Cardiovascular: S1 and S2, regular / irregular rhythm  Gastrointestinal: soft, nontender  Extremities:  no edema  Vascular: Carotid Bruit - no  Musculoskeletal: no joint swelling/tenderness, no abnormal movements  Skin: No rashes    Neurological exam:  HF: Lethargic, awakens when called but if attention is not sustained by loud agressive questioning he lapses back into sleep. Speech fluent, No Aphasia or paraphasic errors. Naming /repetition intact . Unable to spell in reverse or recall immediately 3 items presented for  CN: TYRONE, EOMI, VFF, facial sensation normal, no NLFD, tongue midline, Palate moves equally, SCM equal bilaterally  Motor: No pronator drift, Strength cannot be tested reliably because of lethargy but appears to be weaker on the left.(old) , normal bulk , no tremor, rigidity or bradykinesia.    Sens: Intact to light touch / PP/    Reflexes: Symmetric and normal . BJ 2+, BR 2+, KJ 2+, AJ 2+, downgoing toes b/l  Coord:  cannnot cooperate with  FNFA, dysmetria, KEI   Gait/Balance: Cannot test unable to sit or stand without assistance    NIHSS:          Labs:      8.3    6.62  )-----------( 214      ( 30 Jan 2020 07:48 )             26.7     01-30    144  |  111<H>  |  23<H>  ----------------------------<  82  3.9   |  29  |  1.30    Ca    8.7      30 Jan 2020 07:48    TPro  6.1  /  Alb  2.9<L>  /  TBili  0.4  /  DBili  x   /  AST  13  /  ALT  28  /  AlkPhos  77  01-30              Radiology report:  - CT head:  < from: CT Head No Cont (01.27.20 @ 20:33) >  EXAM:  CT BRAIN                            PROCEDURE DATE:  01/27/2020          INTERPRETATION:  INDICATION:  Lethargy  TECHNIQUE:  A non contrast 2.5mm axial CT study of the brain was performed from skull base to vertex. Coronal and sagittal reformations were generated from the axial data.  COMPARISON EXAMINATION:  CT dated 4/17/2018    FINDINGS:      HEMISPHERES:  Chronic small vessel ischemic changes are again noted throughout both hemispheres with atrophy. No acute infarct or hemorrhage is suggested.  VENTRICLES:  Ex vacuo enlargement is noted  POSTERIOR FOSSA:  Chronic ischemic changes are noted in the brainstem and in both cerebellar hemispheres with atrophic change as well.  EXTRACEREBRAL SPACES:  No subdural or epidural collections are noted.  SKULL BASE AND CALVARIUM:  Appears intact.  No fracture or destructive lesion is identified.  SINUSES AND MASTOIDS:  Clear.  MISCELLANEOUS:  No orbital or suprasellar abnormality noted.      IMPRESSION:    1)  chronic ischemic changes are diffusely noted throughout both hemispheres as well as within the brainstem and cerebellum. Similar findings were noted previously. No acute abnormality is suggested.  2)  clear sinuses and mastoids..      JEANNE ROCHA M.D., ATTENDING RADIOLOGIST  This document has been electronically signed. Jan 27 2020  8:20PM          < end of copied text > Nursing/Physical therapy

## 2021-08-30 ENCOUNTER — TRANSCRIPTION ENCOUNTER (OUTPATIENT)
Age: 86
End: 2021-08-30

## 2021-08-30 VITALS
DIASTOLIC BLOOD PRESSURE: 84 MMHG | SYSTOLIC BLOOD PRESSURE: 128 MMHG | RESPIRATION RATE: 16 BRPM | HEART RATE: 85 BPM | OXYGEN SATURATION: 98 % | TEMPERATURE: 98 F

## 2021-08-30 LAB
ANION GAP SERPL CALC-SCNC: 7 MMOL/L — SIGNIFICANT CHANGE UP (ref 5–17)
BUN SERPL-MCNC: 12 MG/DL — SIGNIFICANT CHANGE UP (ref 7–23)
CALCIUM SERPL-MCNC: 8.2 MG/DL — LOW (ref 8.4–10.5)
CHLORIDE SERPL-SCNC: 107 MMOL/L — SIGNIFICANT CHANGE UP (ref 96–108)
CO2 SERPL-SCNC: 27 MMOL/L — SIGNIFICANT CHANGE UP (ref 22–31)
CREAT SERPL-MCNC: 0.88 MG/DL — SIGNIFICANT CHANGE UP (ref 0.5–1.3)
GLUCOSE SERPL-MCNC: 121 MG/DL — HIGH (ref 70–99)
HCT VFR BLD CALC: 35.5 % — SIGNIFICANT CHANGE UP (ref 34.5–45)
HGB BLD-MCNC: 12.2 G/DL — SIGNIFICANT CHANGE UP (ref 11.5–15.5)
MAGNESIUM SERPL-MCNC: 2 MG/DL — SIGNIFICANT CHANGE UP (ref 1.6–2.6)
MCHC RBC-ENTMCNC: 30.6 PG — SIGNIFICANT CHANGE UP (ref 27–34)
MCHC RBC-ENTMCNC: 34.4 GM/DL — SIGNIFICANT CHANGE UP (ref 32–36)
MCV RBC AUTO: 89 FL — SIGNIFICANT CHANGE UP (ref 80–100)
NRBC # BLD: 0 /100 WBCS — SIGNIFICANT CHANGE UP (ref 0–0)
PLATELET # BLD AUTO: 197 K/UL — SIGNIFICANT CHANGE UP (ref 150–400)
POTASSIUM SERPL-MCNC: 4.5 MMOL/L — SIGNIFICANT CHANGE UP (ref 3.5–5.3)
POTASSIUM SERPL-SCNC: 4.5 MMOL/L — SIGNIFICANT CHANGE UP (ref 3.5–5.3)
RBC # BLD: 3.99 M/UL — SIGNIFICANT CHANGE UP (ref 3.8–5.2)
RBC # FLD: 14.3 % — SIGNIFICANT CHANGE UP (ref 10.3–14.5)
SODIUM SERPL-SCNC: 141 MMOL/L — SIGNIFICANT CHANGE UP (ref 135–145)
WBC # BLD: 8.09 K/UL — SIGNIFICANT CHANGE UP (ref 3.8–10.5)
WBC # FLD AUTO: 8.09 K/UL — SIGNIFICANT CHANGE UP (ref 3.8–10.5)

## 2021-08-30 PROCEDURE — 80048 BASIC METABOLIC PNL TOTAL CA: CPT

## 2021-08-30 PROCEDURE — 74176 CT ABD & PELVIS W/O CONTRAST: CPT | Mod: MG

## 2021-08-30 PROCEDURE — 86850 RBC ANTIBODY SCREEN: CPT

## 2021-08-30 PROCEDURE — 85018 HEMOGLOBIN: CPT

## 2021-08-30 PROCEDURE — 80053 COMPREHEN METABOLIC PANEL: CPT

## 2021-08-30 PROCEDURE — 85014 HEMATOCRIT: CPT

## 2021-08-30 PROCEDURE — 96361 HYDRATE IV INFUSION ADD-ON: CPT

## 2021-08-30 PROCEDURE — 85025 COMPLETE CBC W/AUTO DIFF WBC: CPT

## 2021-08-30 PROCEDURE — 99232 SBSQ HOSP IP/OBS MODERATE 35: CPT

## 2021-08-30 PROCEDURE — 81001 URINALYSIS AUTO W/SCOPE: CPT

## 2021-08-30 PROCEDURE — 96376 TX/PRO/DX INJ SAME DRUG ADON: CPT

## 2021-08-30 PROCEDURE — 85027 COMPLETE CBC AUTOMATED: CPT

## 2021-08-30 PROCEDURE — 84484 ASSAY OF TROPONIN QUANT: CPT

## 2021-08-30 PROCEDURE — 96375 TX/PRO/DX INJ NEW DRUG ADDON: CPT

## 2021-08-30 PROCEDURE — 86901 BLOOD TYPING SEROLOGIC RH(D): CPT

## 2021-08-30 PROCEDURE — 86769 SARS-COV-2 COVID-19 ANTIBODY: CPT

## 2021-08-30 PROCEDURE — 97162 PT EVAL MOD COMPLEX 30 MIN: CPT

## 2021-08-30 PROCEDURE — 99239 HOSP IP/OBS DSCHRG MGMT >30: CPT

## 2021-08-30 PROCEDURE — 36415 COLL VENOUS BLD VENIPUNCTURE: CPT

## 2021-08-30 PROCEDURE — 87635 SARS-COV-2 COVID-19 AMP PRB: CPT

## 2021-08-30 PROCEDURE — 96374 THER/PROPH/DIAG INJ IV PUSH: CPT

## 2021-08-30 PROCEDURE — 99285 EMERGENCY DEPT VISIT HI MDM: CPT | Mod: 25

## 2021-08-30 PROCEDURE — 83036 HEMOGLOBIN GLYCOSYLATED A1C: CPT

## 2021-08-30 PROCEDURE — 83690 ASSAY OF LIPASE: CPT

## 2021-08-30 PROCEDURE — 83735 ASSAY OF MAGNESIUM: CPT

## 2021-08-30 PROCEDURE — 86900 BLOOD TYPING SEROLOGIC ABO: CPT

## 2021-08-30 PROCEDURE — 93005 ELECTROCARDIOGRAM TRACING: CPT

## 2021-08-30 PROCEDURE — 82272 OCCULT BLD FECES 1-3 TESTS: CPT

## 2021-08-30 PROCEDURE — 87086 URINE CULTURE/COLONY COUNT: CPT

## 2021-08-30 PROCEDURE — G1004: CPT

## 2021-08-30 PROCEDURE — 71045 X-RAY EXAM CHEST 1 VIEW: CPT

## 2021-08-30 RX ADMIN — SODIUM CHLORIDE 75 MILLILITER(S): 9 INJECTION, SOLUTION INTRAVENOUS at 06:07

## 2021-08-30 RX ADMIN — Medication 25 MILLIGRAM(S): at 06:08

## 2021-08-30 RX ADMIN — Medication 81 MILLIGRAM(S): at 11:27

## 2021-08-30 RX ADMIN — Medication 50 MICROGRAM(S): at 06:07

## 2021-08-30 RX ADMIN — AMLODIPINE BESYLATE 5 MILLIGRAM(S): 2.5 TABLET ORAL at 06:07

## 2021-08-30 RX ADMIN — PANTOPRAZOLE SODIUM 40 MILLIGRAM(S): 20 TABLET, DELAYED RELEASE ORAL at 06:07

## 2021-08-30 NOTE — PROGRESS NOTE ADULT - REASON FOR ADMISSION
vomiting due to gastric outlet obstruction

## 2021-08-30 NOTE — DISCHARGE NOTE NURSING/CASE MANAGEMENT/SOCIAL WORK - NSDCPEFALRISK_GEN_ALL_CORE
For information on Fall & injury Prevention, visit [email or web address]

## 2021-08-30 NOTE — PROGRESS NOTE ADULT - ASSESSMENT
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 Sx now resolved. Surgery consulted. Keeping NPO.     Also w/ coffee ground emesis, likely chary villalobos tear from retching.    Problem/Plan - 1:  ·  Problem: Gastric outlet obstruction with hx of hiatal hernia, improved and resolved  ·  Plan: - nausea/vomiting now resolved w/ zofran. Cont PRN zofran.   - Surgery consult called, discussed w/ Dr. Carrasco  - Tolerating soft diet today, stable for discharge home  - f/u surgery in one week    Problem/Plan - 2:  ·  Problem: Hernia, hiatal.   ·  Plan: seen on CT scan as likely cause of gastric outlet obstruction. likely chronic.   - Gen Surg consult as above.    Problem/Plan - 3:  ·  Problem: Acute blood loss anemia with Coffee ground emesis, stable  ·  Plan: likely from constant retching causing chary villalobos tear. Fecal occult negative. no melena or hematochezia. hemoglobin wnl. Given one dose IV pantoprazole 80 mg in ER.   - Will give protonix 40mg BID   - f/u H/H in AM    Problem/Plan - 4:  ·  Problem: Acute kidney injury superimposed on CKD, stable  ·  Plan: CKD st. IIIb  - likely prerenal in etiology from dehydration from vomiting.   - IVF x 24hrs    Problem/Plan - 5:  ·  Problem: Acute hypernatremia, hypokalemia, hypomagnesemia  ·  Plan: Will replace K and Mg  - f/u AM labs.    Problem/Plan - 6:  ·  Problem: Dehydration.   ·  Plan: due to vomiting and inability to tolerate PO.  - gentle IVF x 24hrs    Problem/Plan - 7:  ·  Problem: Hypothyroid.   ·  Plan: cont home levothyroxine 50 mcg.    Problem/Plan - 8:  ·  Problem: Depression.   ·  Plan: cont home mirtazapine.    Problem/Plan - 9:  ·  Problem: Hypertension.   ·  Plan: - cont home metoprolol w/ hold parameters.    Problem/Plan - 10:  ·  Problem: Coronary artery disease.   ·  Plan; Per previous documentation, stent placed in 2008. Cardiac cath in 2014 that showed nonobstructive CAD.   - cont aspirin and statin.    Problem/Plan - 11:  ·  Problem: Hyperlipidemia.   ·  Plan: cont atorvastatin 40 mg.    Problem/Plan - 12:  ·  Problem: Hyperglycemia, unspecified.   ·  Plan: A1C 5.8      Additional Information:  Additional Information: dvt ppx: hold lovenox d/t coffee ground. SCDs.   Code status: patient's son reports full code. Prior MOLST in chart but unable to find.  8/30: Spoke with son, Bradyen [phone], all questions answered, in agreement with above.  DISP: D/c plan to home today    daughter, Michaelle [phone]; [phone]

## 2021-08-30 NOTE — PROGRESS NOTE ADULT - SUBJECTIVE AND OBJECTIVE BOX
Patient is a 89y old  Female who presents with a chief complaint of vomiting due to gastric outlet obstruction (28 Aug 2021 11:27)    No events overnight  States she feels better  Denies chest pain, SOB  Does not remember last BM  Patient seen and examined at bedside.    ALLERGIES:  cefuroxime (Dystonic RXN)  hydrochlorothiazide (Anaphylaxis; Angioedema)  levofloxacin (Flushing)  sulfamethoxazole (Unknown)    MEDICATIONS  (STANDING):  amLODIPine   Tablet 5 milliGRAM(s) Oral daily  aspirin enteric coated 81 milliGRAM(s) Oral daily  atorvastatin 40 milliGRAM(s) Oral at bedtime  lactated ringers. 1000 milliLiter(s) (100 mL/Hr) IV Continuous <Continuous>  levothyroxine 50 MICROGram(s) Oral daily  metoprolol tartrate 25 milliGRAM(s) Oral two times a day  mirtazapine 7.5 milliGRAM(s) Oral at bedtime  tamsulosin 0.4 milliGRAM(s) Oral at bedtime    MEDICATIONS  (PRN):  acetaminophen   Tablet .. 650 milliGRAM(s) Oral every 6 hours PRN Temp greater or equal to 38.5C (101.3F), Mild Pain (1 - 3)  aluminum hydroxide/magnesium hydroxide/simethicone Suspension 30 milliLiter(s) Oral every 4 hours PRN Dyspepsia  melatonin 3 milliGRAM(s) Oral at bedtime PRN Insomnia  ondansetron Injectable 4 milliGRAM(s) IV Push every 8 hours PRN Nausea and/or Vomiting    Vital Signs Last 24 Hrs  T(F): 98.2 (28 Aug 2021 05:20), Max: 99.9 (27 Aug 2021 20:09)  HR: 69 (28 Aug 2021 05:20) (69 - 100)  BP: 116/71 (28 Aug 2021 05:20) (115/72 - 150/75)  RR: 15 (28 Aug 2021 05:20) (15 - 16)  SpO2: 97% (28 Aug 2021 05:20) (93% - 97%)  I&O's Summary    27 Aug 2021 07:01  -  28 Aug 2021 07:00  --------------------------------------------------------  IN: 1100 mL / OUT: 0 mL / NET: 1100 mL      BMI (kg/m2): 21.6 (21 @ 20:09)  PHYSICAL EXAM:  General: NAD, A/O x 1, speaking in full sentences, Pala, hearing aide in place, comfortable  ENT: MMM, no scleral icterus  Neck: Supple, No JVD, no thyroidomegaly  Lungs: Clear to auscultation bilaterally, no wheezes, no rales, no rhonchi, good inspiratory effort  Cardio: RRR, S1/S2, No murmurs  Abdomen: Soft, Nontender, Nondistended; Bowel sounds present  Extremities: No calf tenderness, No pitting edema, no skin changes    LABS:                        10.2   11.02 )-----------( 184      ( 28 Aug 2021 05:15 )             30.2           145  |  108  |  28  ----------------------------<  101  3.6   |  30  |  1.07    Ca    8.2      28 Aug 2021 05:15    TPro  5.8  /  Alb  2.7  /  TBili  0.8  /  DBili  x   /  AST    /  ALT  21  /  AlkPhos  91       eGFR if Non African American: 45 mL/min/1.73M2 (21 @ 05:15)  eGFR if African American: 52 mL/min/1.73M2 (21 @ 05:15)         CARDIAC MARKERS ( 27 Aug 2021 13:21 )  <.017 ng/mL / x     / x     / x     / x        Urinalysis Basic - ( 27 Aug 2021 18:40 )    Color: Yellow / Appearance: Clear / S.010 / pH: x  Gluc: x / Ketone: Moderate  / Bili: Negative / Urobili: 1   Blood: x / Protein: 100 / Nitrite: Negative   Leuk Esterase: Trace / RBC: 0-4 /HPF / WBC 0-2 /HPF   Sq Epi: x / Non Sq Epi: Neg.-Few / Bacteria: Many /HPF        COVID-19 PCR: NotDetec (21 @ 16:45)    COVID-19 PCR: NotDetec (21 @ 16:45)      
Patient is a 89y old  Female who presents with a chief complaint of vomiting due to gastric outlet obstruction (28 Aug 2021 13:01)    reports feeling well  Denies N/V, abd pain  Tolerating liquid diet  Patient seen and examined at bedside.    ALLERGIES:  cefuroxime (Dystonic RXN)  hydrochlorothiazide (Anaphylaxis; Angioedema)  levofloxacin (Flushing)  sulfamethoxazole (Unknown)    MEDICATIONS  (STANDING):  amLODIPine   Tablet 5 milliGRAM(s) Oral daily  aspirin enteric coated 81 milliGRAM(s) Oral daily  atorvastatin 40 milliGRAM(s) Oral at bedtime  dextrose 5% + sodium chloride 0.45%. 1000 milliLiter(s) (75 mL/Hr) IV Continuous <Continuous>  levothyroxine 50 MICROGram(s) Oral daily  metoprolol tartrate 25 milliGRAM(s) Oral two times a day  mirtazapine 7.5 milliGRAM(s) Oral at bedtime  pantoprazole    Tablet 40 milliGRAM(s) Oral two times a day  tamsulosin 0.4 milliGRAM(s) Oral at bedtime    MEDICATIONS  (PRN):  acetaminophen   Tablet .. 650 milliGRAM(s) Oral every 6 hours PRN Temp greater or equal to 38.5C (101.3F), Mild Pain (1 - 3)  aluminum hydroxide/magnesium hydroxide/simethicone Suspension 30 milliLiter(s) Oral every 4 hours PRN Dyspepsia  melatonin 3 milliGRAM(s) Oral at bedtime PRN Insomnia  ondansetron Injectable 4 milliGRAM(s) IV Push every 8 hours PRN Nausea and/or Vomiting    Vital Signs Last 24 Hrs  T(F): 97.8 (29 Aug 2021 05:00), Max: 98.4 (28 Aug 2021 19:45)  HR: 68 (29 Aug 2021 05:00) (68 - 82)  BP: 122/67 (29 Aug 2021 05:00) (122/67 - 123/84)  RR: 16 (29 Aug 2021 05:00) (16 - 16)  SpO2: 96% (29 Aug 2021 05:00) (96% - 96%)  I&O's Summary    28 Aug 2021 07:01  -  29 Aug 2021 07:00  --------------------------------------------------------  IN: 1575 mL / OUT: 575 mL / NET: 1000 mL      BMI (kg/m2): 21.6 (21 @ 20:09)  PHYSICAL EXAM:  General: NAD, A/O x 1, hard of hearing  ENT: MMM, no scleral icterus  Neck: Supple, No JVD, no thyroidomegaly  Lungs: Clear to auscultation bilaterally, no wheezes, no rales, no rhonchi, good inspiratory effort  Cardio: RRR, S1/S2, No murmurs  Abdomen: Soft, Nontender, Nondistended; Bowel sounds present  Extremities: No calf tenderness, No pitting edema, no skin changes    LABS:                        11.6   8.39  )-----------( 186      ( 29 Aug 2021 05:24 )             35.1           142  |  107  |  20  ----------------------------<  104  3.3   |  31  |  1.12    Ca    8.2      29 Aug 2021 05:24  Mg     1.5         TPro  5.8  /  Alb  2.7  /  TBili  0.8  /  DBili  x   /  AST  35  /  ALT  24  /  AlkPhos  97       eGFR if Non African American: 42 mL/min/1.73M2 (21 @ 05:24)  eGFR if : 49 mL/min/1.73M2 (21 @ 05:24)         CARDIAC MARKERS ( 27 Aug 2021 13:21 )  <.017 ng/mL / x     / x     / x     / x        Urinalysis Basic - ( 27 Aug 2021 18:40 )    Color: Yellow / Appearance: Clear / S.010 / pH: x  Gluc: x / Ketone: Moderate  / Bili: Negative / Urobili: 1   Blood: x / Protein: 100 / Nitrite: Negative   Leuk Esterase: Trace / RBC: 0-4 /HPF / WBC 0-2 /HPF   Sq Epi: x / Non Sq Epi: Neg.-Few / Bacteria: Many /HPF    Culture - Urine (collected 27 Aug 2021 18:40)  Source: Clean Catch Clean Catch (Midstream)  Final Report (29 Aug 2021 07:42):    >=3 organisms. Probable collection contamination.      COVID-19 PCR: NotDetec (21 @ 16:45)  COVID-19 PCR: NotDetec (21 @ 16:45)        RADIOLOGY & ADDITIONAL TESTS:  Xray Chest 1 View AP/PA (21 @ 14:02) >  IMPRESSION: No acute finding or change.    Care Discussed with Consultants/Other Providers:   Dr Carrasco:  Full iquid diet today, solid food in AM
Patient is a 89y old  Female who presents with a chief complaint of vomiting due to gastric outlet obstruction (29 Aug 2021 10:12)    Tolerated diet, denies N/V/abd pain  Denies chest pain, SOB  Ambulatory  Patient seen and examined at bedside.    ALLERGIES:  cefuroxime (Dystonic RXN)  hydrochlorothiazide (Anaphylaxis; Angioedema)  levofloxacin (Flushing)  sulfamethoxazole (Unknown)    MEDICATIONS  (STANDING):  amLODIPine   Tablet 5 milliGRAM(s) Oral daily  aspirin enteric coated 81 milliGRAM(s) Oral daily  atorvastatin 40 milliGRAM(s) Oral at bedtime  dextrose 5% + sodium chloride 0.45%. 1000 milliLiter(s) (75 mL/Hr) IV Continuous <Continuous>  levothyroxine 50 MICROGram(s) Oral daily  metoprolol tartrate 25 milliGRAM(s) Oral two times a day  mirtazapine 7.5 milliGRAM(s) Oral at bedtime  pantoprazole    Tablet 40 milliGRAM(s) Oral two times a day  tamsulosin 0.4 milliGRAM(s) Oral at bedtime    MEDICATIONS  (PRN):  acetaminophen   Tablet .. 650 milliGRAM(s) Oral every 6 hours PRN Temp greater or equal to 38.5C (101.3F), Mild Pain (1 - 3)  aluminum hydroxide/magnesium hydroxide/simethicone Suspension 30 milliLiter(s) Oral every 4 hours PRN Dyspepsia  melatonin 3 milliGRAM(s) Oral at bedtime PRN Insomnia  ondansetron Injectable 4 milliGRAM(s) IV Push every 8 hours PRN Nausea and/or Vomiting    Vital Signs Last 24 Hrs  T(F): 98.1 (30 Aug 2021 06:00), Max: 98.1 (30 Aug 2021 06:00)  HR: 85 (30 Aug 2021 06:00) (73 - 85)  BP: 128/84 (30 Aug 2021 06:00) (122/72 - 128/84)  RR: 16 (30 Aug 2021 06:00) (16 - 16)  SpO2: 98% (30 Aug 2021 06:00) (97% - 98%)  I&O's Summary    29 Aug 2021 07:01  -  30 Aug 2021 07:00  --------------------------------------------------------  IN: 1575 mL / OUT: 1000 mL / NET: 575 mL    30 Aug 2021 07:01  -  30 Aug 2021 10:40  --------------------------------------------------------  IN: 400 mL / OUT: 0 mL / NET: 400 mL      BMI (kg/m2): 21.6 (21 @ 20:09)  PHYSICAL EXAM:  General: NAD, A/O x 3  ENT: MMM, no scleral icterus  Neck: Supple, No JVD, no thyroidomegaly  Lungs: Clear to auscultation bilaterally, no wheezes, no rales, no rhonchi, good inspiratory effort  Cardio: RRR, S1/S2, No murmurs  Abdomen: Soft, Nontender, Nondistended; Bowel sounds present  Extremities: No calf tenderness, No pitting edema, no skin changes    LABS:                        12.2   8.09  )-----------( 197      ( 30 Aug 2021 07:21 )             35.5       08-30    141  |  107  |  12  ----------------------------<  121  4.5   |  27  |  0.88    Ca    8.2      30 Aug 2021 07:21  Mg     2.0         TPro  5.8  /  Alb  2.7  /  TBili  0.8  /  DBili  x   /  AST  35  /  ALT  24  /  AlkPhos  97  08-     eGFR if Non African American: 56 mL/min/1.73M2 (21 @ 07:21)  eGFR if : 65 mL/min/1.73M2 (21 @ 07:21)         CARDIAC MARKERS ( 27 Aug 2021 13:21 )  <.017 ng/mL / x     / x     / x     / x          Urinalysis Basic - ( 27 Aug 2021 18:40 )    Color: Yellow / Appearance: Clear / S.010 / pH: x  Gluc: x / Ketone: Moderate  / Bili: Negative / Urobili: 1   Blood: x / Protein: 100 / Nitrite: Negative   Leuk Esterase: Trace / RBC: 0-4 /HPF / WBC 0-2 /HPF   Sq Epi: x / Non Sq Epi: Neg.-Few / Bacteria: Many /HPF        Culture - Urine (collected 27 Aug 2021 18:40)  Source: Clean Catch Clean Catch (Midstream)  Final Report (29 Aug 2021 07:42):    >=3 organisms. Probable collection contamination.      COVID-19 PCR: NotDetec (21 @ 16:45)    COVID-19 PCR: NotDetec (21 @ 16:45)        
The patient continues to feel well, She is tolerating a regular diet and is passing flatus. She remains afebrile with a normal WBC.     PFSH: All noncontributory    MEDICATIONS REVIEWED:acetaminophen   Tablet .. 650 milliGRAM(s) Oral every 6 hours PRN  aluminum hydroxide/magnesium hydroxide/simethicone Suspension 30 milliLiter(s) Oral every 4 hours PRN  amLODIPine   Tablet 5 milliGRAM(s) Oral daily  aspirin enteric coated 81 milliGRAM(s) Oral daily  atorvastatin 40 milliGRAM(s) Oral at bedtime  dextrose 5% + sodium chloride 0.45%. 1000 milliLiter(s) IV Continuous <Continuous>  levothyroxine 50 MICROGram(s) Oral daily  melatonin 3 milliGRAM(s) Oral at bedtime PRN  metoprolol tartrate 25 milliGRAM(s) Oral two times a day  mirtazapine 7.5 milliGRAM(s) Oral at bedtime  ondansetron Injectable 4 milliGRAM(s) IV Push every 8 hours PRN  pantoprazole    Tablet 40 milliGRAM(s) Oral two times a day  tamsulosin 0.4 milliGRAM(s) Oral at bedtime      ALLERGIES:cefuroxime (Dystonic RXN)  hydrochlorothiazide (Anaphylaxis; Angioedema)  levofloxacin (Flushing)  sulfamethoxazole (Unknown)          PHYSICAL EXAMINATION:  RESPIRATORY: Clear to auscultation bilaterally, respirations non-labored.  CARDIAC: RR, normal S1S2, no murmurs.  ABDOMEN: soft, BS present, no masses, no hernias, no peritoneal signs, no KLS, nontender.  VASCULAR: Equal and normal pulses.  MUSCULOSKELETAL: Full ROM, no deformities.      T(C): 36.7 (08-30-21 @ 06:00), Max: 36.7 (08-29-21 @ 16:55)  HR: 85 (08-30-21 @ 06:00) (73 - 85)  BP: 128/84 (08-30-21 @ 06:00) (122/72 - 128/84)  RR: 16 (08-30-21 @ 06:00) (16 - 16)  SpO2: 98% (08-30-21 @ 06:00) (97% - 98%)                          12.2   8.09  )-----------( 197      ( 30 Aug 2021 07:21 )             35.5       08-30    141  |  107  |  12  ----------------------------<  121<H>  4.5   |  27  |  0.88    Ca    8.2<L>      30 Aug 2021 07:21  Mg     2.0     08-30    TPro  5.8<L>  /  Alb  2.7<L>  /  TBili  0.8  /  DBili  x   /  AST  35  /  ALT  24  /  AlkPhos  97  08-29      
The patient continues to feel well, as she denies nausea, vomiting nor any abdominal or chest pain since admission. The WBC is now normal, and she remains afebrile. The patient has been tolerating a clear fluid diet.    PFSH: All noncontributory    MEDICATIONS REVIEWED:acetaminophen   Tablet .. 650 milliGRAM(s) Oral every 6 hours PRN  aluminum hydroxide/magnesium hydroxide/simethicone Suspension 30 milliLiter(s) Oral every 4 hours PRN  amLODIPine   Tablet 5 milliGRAM(s) Oral daily  aspirin enteric coated 81 milliGRAM(s) Oral daily  atorvastatin 40 milliGRAM(s) Oral at bedtime  dextrose 5% + sodium chloride 0.45%. 1000 milliLiter(s) IV Continuous <Continuous>  levothyroxine 50 MICROGram(s) Oral daily  magnesium sulfate  IVPB 2 Gram(s) IV Intermittent once  melatonin 3 milliGRAM(s) Oral at bedtime PRN  metoprolol tartrate 25 milliGRAM(s) Oral two times a day  mirtazapine 7.5 milliGRAM(s) Oral at bedtime  ondansetron Injectable 4 milliGRAM(s) IV Push every 8 hours PRN  pantoprazole    Tablet 40 milliGRAM(s) Oral two times a day  potassium chloride    Tablet ER 40 milliEquivalent(s) Oral every 4 hours  tamsulosin 0.4 milliGRAM(s) Oral at bedtime      ALLERGIES:cefuroxime (Dystonic RXN)  hydrochlorothiazide (Anaphylaxis; Angioedema)  levofloxacin (Flushing)  sulfamethoxazole (Unknown)      PHYSICAL EXAMINATION:  RESPIRATORY: Clear to auscultation bilaterally, respirations non-labored.  CARDIAC: RR, normal S1S2, no murmurs.  ABDOMEN: soft, BS present, no masses, no hernias, no peritoneal signs, no KLS, nontender.  VASCULAR: Equal and normal pulses.  MUSCULOSKELETAL: Full ROM, no deformities.      T(C): 36.6 (08-29-21 @ 05:00), Max: 36.9 (08-28-21 @ 19:45)  HR: 68 (08-29-21 @ 05:00) (68 - 82)  BP: 122/67 (08-29-21 @ 05:00) (122/67 - 123/84)  RR: 16 (08-29-21 @ 05:00) (16 - 16)  SpO2: 96% (08-29-21 @ 05:00) (96% - 96%)                          11.6   8.39  )-----------( 186      ( 29 Aug 2021 05:24 )             35.1       08-29    142  |  107  |  20  ----------------------------<  104<H>  3.3<L>   |  31  |  1.12    Ca    8.2<L>      29 Aug 2021 05:24  Mg     1.5     08-29    TPro  5.8<L>  /  Alb  2.7<L>  /  TBili  0.8  /  DBili  x   /  AST  35  /  ALT  24  /  AlkPhos  97  08-29      
The patient remains without nausea or vomiting since arriving in the ED yesterday. The patient denies abdominal pain, fever or chills. The WBC is slightly elevated (11.02), and she remains afebrile.  PFSH: All noncontributory    MEDICATIONS REVIEWED:acetaminophen   Tablet .. 650 milliGRAM(s) Oral every 6 hours PRN  aluminum hydroxide/magnesium hydroxide/simethicone Suspension 30 milliLiter(s) Oral every 4 hours PRN  amLODIPine   Tablet 5 milliGRAM(s) Oral daily  aspirin enteric coated 81 milliGRAM(s) Oral daily  atorvastatin 40 milliGRAM(s) Oral at bedtime  lactated ringers. 1000 milliLiter(s) IV Continuous <Continuous>  levothyroxine 50 MICROGram(s) Oral daily  melatonin 3 milliGRAM(s) Oral at bedtime PRN  metoprolol tartrate 25 milliGRAM(s) Oral two times a day  mirtazapine 7.5 milliGRAM(s) Oral at bedtime  ondansetron Injectable 4 milliGRAM(s) IV Push every 8 hours PRN  tamsulosin 0.4 milliGRAM(s) Oral at bedtime      ALLERGIES:cefuroxime (Dystonic RXN)  hydrochlorothiazide (Anaphylaxis; Angioedema)  levofloxacin (Flushing)  sulfamethoxazole (Unknown)      PHYSICAL EXAMINATION:  RESPIRATORY: Clear to auscultation bilaterally, respirations non-labored.  CARDIAC: RR, normal S1S2, no murmurs.  ABDOMEN: soft, BS present, no masses, no hernias, no peritoneal signs, no KLS, nontender.  VASCULAR: Equal and normal pulses.  MUSCULOSKELETAL: Full ROM, no deformities.      T(C): 36.8 (08-28-21 @ 05:20), Max: 37.7 (08-27-21 @ 20:09)  HR: 69 (08-28-21 @ 05:20) (69 - 113)  BP: 116/71 (08-28-21 @ 05:20) (115/72 - 150/75)  RR: 15 (08-28-21 @ 05:20) (15 - 22)  SpO2: 97% (08-28-21 @ 05:20) (93% - 98%)                          10.2   11.02 )-----------( 184      ( 28 Aug 2021 05:15 )             30.2       08-28    145  |  108  |  28<H>  ----------------------------<  101<H>  3.6   |  30  |  1.07    Ca    8.2<L>      28 Aug 2021 05:15    TPro  5.8<L>  /  Alb  2.7<L>  /  TBili  0.8  /  DBili  x   /  AST  28  /  ALT  21  /  AlkPhos  91  08-28

## 2021-08-30 NOTE — DISCHARGE NOTE NURSING/CASE MANAGEMENT/SOCIAL WORK - PATIENT PORTAL LINK FT
You can access the FollowMyHealth Patient Portal offered by [facility] by registering at the following website: [email or web address]. By joining [employer]’s FollowMyHealth portal, you will also be able to view your health information using other applications (apps) compatible with our system.

## 2021-08-30 NOTE — PROGRESS NOTE ADULT - TIME BILLING
Discussed all options and risks; all lab values and imaging studies reviewed; discussed with Medicine
discussion regarding all options and risks; all lab values and imaging studies reviewed; discussed with Medicine
Discussion with patient regarding all options and risks; all lab values and imaging studies reviewed; discussed with Medicine

## 2021-08-30 NOTE — PROGRESS NOTE ADULT - ASSESSMENT
IMPRESSION: Hiatal hernia with chronic gastric outlet obstruction    PLAN: continue solids Po and discharge later today            Appointment in my office in one week

## 2021-09-02 ENCOUNTER — NON-APPOINTMENT (OUTPATIENT)
Age: 86
End: 2021-09-02

## 2021-09-03 ENCOUNTER — APPOINTMENT (OUTPATIENT)
Dept: FAMILY MEDICINE | Facility: CLINIC | Age: 86
End: 2021-09-03
Payer: MEDICARE

## 2021-09-03 VITALS
TEMPERATURE: 97.3 F | SYSTOLIC BLOOD PRESSURE: 138 MMHG | HEART RATE: 72 BPM | RESPIRATION RATE: 16 BRPM | OXYGEN SATURATION: 98 % | DIASTOLIC BLOOD PRESSURE: 74 MMHG
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
## 2021-09-03 PROCEDURE — 99213 OFFICE O/P EST LOW 20 MIN: CPT

## 2021-09-07 ENCOUNTER — RX RENEWAL (OUTPATIENT)
Age: 86
End: 2021-09-07
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
## 2021-09-07 NOTE — PHYSICAL EXAM
[Well-Appearing] : well-appearing [No Acute Distress] : no acute distress [Normal S1, S2] : normal S1 and S2 [Soft] : abdomen soft [Non-distended] : non-distended [Non Tender] : non-tender [No CVA Tenderness] : no CVA  tenderness [No Spinal Tenderness] : no spinal tenderness [Normal] : no rash [de-identified] : in NAD Pleasant denoies any active isdsues

## 2021-09-07 NOTE — HISTORY OF PRESENT ILLNESS
[Family Member] : family member [FreeTextEntry1] : hospital discharge f/u [de-identified] : 89 y/o HTN HLD  CAD COPD CKD hypothyroidism here for f/u hospital discharge for N$V found to have gastric outlet obstruction was f/u Dr Carrasco surgery while in hospital and self resolved. Pt now here notes is doing well feels fine and little leg weakness now using walker at home usually walks w/o assistance denies CP SOB palpitations dizziness

## 2021-09-07 NOTE — HEALTH RISK ASSESSMENT
[Intercurrent hospitalizations] : was admitted to the hospital  [de-identified] : gastric outlet obstruciton  [de-identified] : General surgey DR Carrasco

## 2021-09-10 ENCOUNTER — APPOINTMENT (OUTPATIENT)
Dept: SURGERY | Facility: CLINIC | Age: 86
End: 2021-09-10
Payer: MEDICARE

## 2021-09-10 DIAGNOSIS — E87.1 HYPO-OSMOLALITY AND HYPONATREMIA: ICD-10-CM

## 2021-09-10 DIAGNOSIS — Z82.49 FAMILY HISTORY OF ISCHEMIC HEART DISEASE AND OTHER DISEASES OF THE CIRCULATORY SYSTEM: ICD-10-CM

## 2021-09-10 DIAGNOSIS — C20 MALIGNANT NEOPLASM OF RECTUM: ICD-10-CM

## 2021-09-10 DIAGNOSIS — Z87.09 PERSONAL HISTORY OF OTHER DISEASES OF THE RESPIRATORY SYSTEM: ICD-10-CM

## 2021-09-10 PROCEDURE — 99214 OFFICE O/P EST MOD 30 MIN: CPT

## 2021-09-10 NOTE — HISTORY OF PRESENT ILLNESS
[de-identified] : The patient was recently hospitalized for a large HH with gastric outlet obstruction. Presently, she is feeling well, as she is eating well and denies any abdominal pain. The patient denies fever or chills.

## 2021-09-10 NOTE — ASSESSMENT
[FreeTextEntry1] : Long discussion regarding all options and risks\par Discussed with Medicine\par All lab values and imaging studies reviewed\par To continue her present diet\par Return PRN

## 2021-09-10 NOTE — PHYSICAL EXAM
[Normal Breath Sounds] : Normal breath sounds [Wheezing] : wheezing was heard [Normal Heart Sounds] : normal heart sounds [Normal Rate and Rhythm] : normal rate and rhythm [Abdomen Tenderness] : ~T ~M No abdominal tenderness [de-identified] : nl [de-identified] : nl [de-identified] : nl

## 2021-09-19 ENCOUNTER — NON-APPOINTMENT (OUTPATIENT)
Age: 86
End: 2021-09-19

## 2021-10-04 NOTE — ED ADULT TRIAGE NOTE - SOURCE OF INFORMATION
Blood culture set (#2) drawn from right arm with (butterfly). Bottle tops scrubbed with alcohol pads. Site prepped with [employer] swab, 15 seconds per side, and allowed to dry for 30 seconds prior to venipuncture. Red waste tube drawn prior to collection of specimen.        Mireya Montgomery  10/04/21 0112 Daughter/Son/Patient

## 2021-10-11 ENCOUNTER — INPATIENT (INPATIENT)
Facility: HOSPITAL | Age: 86
LOS: 3 days | Discharge: HOME | DRG: 392 | End: 2021-10-15
Attending: HOSPITALIST | Admitting: STUDENT IN AN ORGANIZED HEALTH CARE EDUCATION/TRAINING PROGRAM
Payer: MEDICARE

## 2021-10-11 VITALS
WEIGHT: 121.92 LBS | SYSTOLIC BLOOD PRESSURE: 138 MMHG | RESPIRATION RATE: 16 BRPM | DIASTOLIC BLOOD PRESSURE: 88 MMHG | HEIGHT: 64 IN | TEMPERATURE: 97 F | HEART RATE: 115 BPM | OXYGEN SATURATION: 97 %

## 2021-10-11 DIAGNOSIS — Z98.49 CATARACT EXTRACTION STATUS, UNSPECIFIED EYE: Chronic | ICD-10-CM

## 2021-10-11 DIAGNOSIS — R11.2 NAUSEA WITH VOMITING, UNSPECIFIED: ICD-10-CM

## 2021-10-11 DIAGNOSIS — Z90.710 ACQUIRED ABSENCE OF BOTH CERVIX AND UTERUS: Chronic | ICD-10-CM

## 2021-10-11 DIAGNOSIS — Z98.89 OTHER SPECIFIED POSTPROCEDURAL STATES: Chronic | ICD-10-CM

## 2021-10-11 LAB
ALBUMIN SERPL ELPH-MCNC: 3.5 G/DL — SIGNIFICANT CHANGE UP (ref 3.3–5)
ALP SERPL-CCNC: 132 U/L — HIGH (ref 40–120)
ALT FLD-CCNC: 22 U/L — SIGNIFICANT CHANGE UP (ref 10–45)
ANION GAP SERPL CALC-SCNC: 12 MMOL/L — SIGNIFICANT CHANGE UP (ref 5–17)
AST SERPL-CCNC: 30 U/L — SIGNIFICANT CHANGE UP (ref 10–40)
BASOPHILS # BLD AUTO: 0.03 K/UL — SIGNIFICANT CHANGE UP (ref 0–0.2)
BASOPHILS NFR BLD AUTO: 0.2 % — SIGNIFICANT CHANGE UP (ref 0–2)
BILIRUB SERPL-MCNC: 0.9 MG/DL — SIGNIFICANT CHANGE UP (ref 0.2–1.2)
BUN SERPL-MCNC: 20 MG/DL — SIGNIFICANT CHANGE UP (ref 7–23)
CALCIUM SERPL-MCNC: 9.6 MG/DL — SIGNIFICANT CHANGE UP (ref 8.4–10.5)
CHLORIDE SERPL-SCNC: 101 MMOL/L — SIGNIFICANT CHANGE UP (ref 96–108)
CO2 SERPL-SCNC: 29 MMOL/L — SIGNIFICANT CHANGE UP (ref 22–31)
CREAT SERPL-MCNC: 1.36 MG/DL — HIGH (ref 0.5–1.3)
EOSINOPHIL # BLD AUTO: 0.05 K/UL — SIGNIFICANT CHANGE UP (ref 0–0.5)
EOSINOPHIL NFR BLD AUTO: 0.4 % — SIGNIFICANT CHANGE UP (ref 0–6)
GLUCOSE SERPL-MCNC: 201 MG/DL — HIGH (ref 70–99)
HCT VFR BLD CALC: 39.1 % — SIGNIFICANT CHANGE UP (ref 34.5–45)
HGB BLD-MCNC: 13.2 G/DL — SIGNIFICANT CHANGE UP (ref 11.5–15.5)
IMM GRANULOCYTES NFR BLD AUTO: 0.3 % — SIGNIFICANT CHANGE UP (ref 0–1.5)
LIDOCAIN IGE QN: 131 U/L — SIGNIFICANT CHANGE UP (ref 73–393)
LYMPHOCYTES # BLD AUTO: 0.88 K/UL — LOW (ref 1–3.3)
LYMPHOCYTES # BLD AUTO: 7.1 % — LOW (ref 13–44)
MCHC RBC-ENTMCNC: 30.9 PG — SIGNIFICANT CHANGE UP (ref 27–34)
MCHC RBC-ENTMCNC: 33.8 GM/DL — SIGNIFICANT CHANGE UP (ref 32–36)
MCV RBC AUTO: 91.6 FL — SIGNIFICANT CHANGE UP (ref 80–100)
MONOCYTES # BLD AUTO: 0.46 K/UL — SIGNIFICANT CHANGE UP (ref 0–0.9)
MONOCYTES NFR BLD AUTO: 3.7 % — SIGNIFICANT CHANGE UP (ref 2–14)
NEUTROPHILS # BLD AUTO: 10.93 K/UL — HIGH (ref 1.8–7.4)
NEUTROPHILS NFR BLD AUTO: 88.3 % — HIGH (ref 43–77)
NRBC # BLD: 0 /100 WBCS — SIGNIFICANT CHANGE UP (ref 0–0)
PLATELET # BLD AUTO: 249 K/UL — SIGNIFICANT CHANGE UP (ref 150–400)
POTASSIUM SERPL-MCNC: 3.9 MMOL/L — SIGNIFICANT CHANGE UP (ref 3.5–5.3)
POTASSIUM SERPL-SCNC: 3.9 MMOL/L — SIGNIFICANT CHANGE UP (ref 3.5–5.3)
PROT SERPL-MCNC: 7.4 G/DL — SIGNIFICANT CHANGE UP (ref 6–8.3)
RBC # BLD: 4.27 M/UL — SIGNIFICANT CHANGE UP (ref 3.8–5.2)
RBC # FLD: 14.2 % — SIGNIFICANT CHANGE UP (ref 10.3–14.5)
SARS-COV-2 RNA SPEC QL NAA+PROBE: SIGNIFICANT CHANGE UP
SODIUM SERPL-SCNC: 142 MMOL/L — SIGNIFICANT CHANGE UP (ref 135–145)
WBC # BLD: 12.39 K/UL — HIGH (ref 3.8–10.5)
WBC # FLD AUTO: 12.39 K/UL — HIGH (ref 3.8–10.5)

## 2021-10-11 PROCEDURE — 99223 1ST HOSP IP/OBS HIGH 75: CPT

## 2021-10-11 PROCEDURE — 71045 X-RAY EXAM CHEST 1 VIEW: CPT | Mod: 26

## 2021-10-11 PROCEDURE — 99285 EMERGENCY DEPT VISIT HI MDM: CPT

## 2021-10-11 PROCEDURE — 74176 CT ABD & PELVIS W/O CONTRAST: CPT | Mod: 26,MA

## 2021-10-11 RX ORDER — METOPROLOL TARTRATE 50 MG
25 TABLET ORAL
Refills: 0 | Status: DISCONTINUED | OUTPATIENT
Start: 2021-10-11 | End: 2021-10-13

## 2021-10-11 RX ORDER — LANOLIN ALCOHOL/MO/W.PET/CERES
3 CREAM (GRAM) TOPICAL AT BEDTIME
Refills: 0 | Status: DISCONTINUED | OUTPATIENT
Start: 2021-10-11 | End: 2021-10-15

## 2021-10-11 RX ORDER — SODIUM CHLORIDE 9 MG/ML
1000 INJECTION INTRAMUSCULAR; INTRAVENOUS; SUBCUTANEOUS ONCE
Refills: 0 | Status: COMPLETED | OUTPATIENT
Start: 2021-10-11 | End: 2021-10-11

## 2021-10-11 RX ORDER — ONDANSETRON 8 MG/1
4 TABLET, FILM COATED ORAL ONCE
Refills: 0 | Status: COMPLETED | OUTPATIENT
Start: 2021-10-11 | End: 2021-10-11

## 2021-10-11 RX ORDER — HEPARIN SODIUM 5000 [USP'U]/ML
5000 INJECTION INTRAVENOUS; SUBCUTANEOUS EVERY 8 HOURS
Refills: 0 | Status: DISCONTINUED | OUTPATIENT
Start: 2021-10-11 | End: 2021-10-12

## 2021-10-11 RX ORDER — TAMSULOSIN HYDROCHLORIDE 0.4 MG/1
0.4 CAPSULE ORAL AT BEDTIME
Refills: 0 | Status: DISCONTINUED | OUTPATIENT
Start: 2021-10-11 | End: 2021-10-15

## 2021-10-11 RX ORDER — SODIUM CHLORIDE 9 MG/ML
1000 INJECTION, SOLUTION INTRAVENOUS
Refills: 0 | Status: DISCONTINUED | OUTPATIENT
Start: 2021-10-11 | End: 2021-10-14

## 2021-10-11 RX ORDER — ASPIRIN/CALCIUM CARB/MAGNESIUM 324 MG
81 TABLET ORAL DAILY
Refills: 0 | Status: DISCONTINUED | OUTPATIENT
Start: 2021-10-11 | End: 2021-10-15

## 2021-10-11 RX ORDER — LEVOTHYROXINE SODIUM 125 MCG
50 TABLET ORAL DAILY
Refills: 0 | Status: DISCONTINUED | OUTPATIENT
Start: 2021-10-11 | End: 2021-10-11

## 2021-10-11 RX ORDER — LEVOTHYROXINE SODIUM 125 MCG
50 TABLET ORAL DAILY
Refills: 0 | Status: DISCONTINUED | OUTPATIENT
Start: 2021-10-12 | End: 2021-10-15

## 2021-10-11 RX ORDER — AMLODIPINE BESYLATE 2.5 MG/1
5 TABLET ORAL ONCE
Refills: 0 | Status: COMPLETED | OUTPATIENT
Start: 2021-10-12 | End: 2021-10-12

## 2021-10-11 RX ORDER — ACETAMINOPHEN 500 MG
650 TABLET ORAL EVERY 6 HOURS
Refills: 0 | Status: DISCONTINUED | OUTPATIENT
Start: 2021-10-11 | End: 2021-10-15

## 2021-10-11 RX ORDER — ATORVASTATIN CALCIUM 80 MG/1
40 TABLET, FILM COATED ORAL AT BEDTIME
Refills: 0 | Status: DISCONTINUED | OUTPATIENT
Start: 2021-10-11 | End: 2021-10-15

## 2021-10-11 RX ORDER — ONDANSETRON 8 MG/1
4 TABLET, FILM COATED ORAL EVERY 8 HOURS
Refills: 0 | Status: DISCONTINUED | OUTPATIENT
Start: 2021-10-11 | End: 2021-10-15

## 2021-10-11 RX ORDER — METOCLOPRAMIDE HCL 10 MG
10 TABLET ORAL ONCE
Refills: 0 | Status: COMPLETED | OUTPATIENT
Start: 2021-10-11 | End: 2021-10-11

## 2021-10-11 RX ADMIN — TAMSULOSIN HYDROCHLORIDE 0.4 MILLIGRAM(S): 0.4 CAPSULE ORAL at 21:37

## 2021-10-11 RX ADMIN — Medication 25 MILLIGRAM(S): at 21:37

## 2021-10-11 RX ADMIN — ONDANSETRON 4 MILLIGRAM(S): 8 TABLET, FILM COATED ORAL at 13:35

## 2021-10-11 RX ADMIN — ONDANSETRON 4 MILLIGRAM(S): 8 TABLET, FILM COATED ORAL at 14:55

## 2021-10-11 RX ADMIN — SODIUM CHLORIDE 1000 MILLILITER(S): 9 INJECTION INTRAMUSCULAR; INTRAVENOUS; SUBCUTANEOUS at 16:13

## 2021-10-11 RX ADMIN — SODIUM CHLORIDE 1000 MILLILITER(S): 9 INJECTION INTRAMUSCULAR; INTRAVENOUS; SUBCUTANEOUS at 14:35

## 2021-10-11 RX ADMIN — SODIUM CHLORIDE 1000 MILLILITER(S): 9 INJECTION INTRAMUSCULAR; INTRAVENOUS; SUBCUTANEOUS at 13:35

## 2021-10-11 RX ADMIN — ATORVASTATIN CALCIUM 40 MILLIGRAM(S): 80 TABLET, FILM COATED ORAL at 21:37

## 2021-10-11 RX ADMIN — SODIUM CHLORIDE 50 MILLILITER(S): 9 INJECTION, SOLUTION INTRAVENOUS at 18:51

## 2021-10-11 RX ADMIN — HEPARIN SODIUM 5000 UNIT(S): 5000 INJECTION INTRAVENOUS; SUBCUTANEOUS at 21:37

## 2021-10-11 RX ADMIN — Medication 10 MILLIGRAM(S): at 16:09

## 2021-10-11 RX ADMIN — Medication 104 MILLIGRAM(S): at 15:39

## 2021-10-11 NOTE — PATIENT PROFILE ADULT - CAREGIVER ADDRESS
CV: Hemodynamically stable, monitor VS  Pulm: Saturating well on RA. Increase incentive spirometry.  GI: tolerating reg diet  : Harp in place. Adequate UOP overnight. Plan to d/c Harp am today  Heme: CBC reviewed, appropriate postop, no sx of anemia. Continue HSQ/Venodynes for DVT ppx  Neuro: PCA for pain control.     Seen with GYN ONC Team  JAMAR Vasquez, PGY2 CV: Hemodynamically stable, monitor VS  Pulm: Saturating well on RA. Increase incentive spirometry.  GI: tolerating reg diet  : Harp in place. Adequate UOP overnight. Harp in until POD#2, continue to monitor output  Heme: CBC reviewed, appropriate postop, no sx of anemia. Continue HSQ/Venodynes for DVT ppx  Neuro: PCA for pain control. Likely transition to PO meds today    Seen with GYN ONC Team  JAMAR Vasquez, PGY2 [address] [facility] [id number]

## 2021-10-11 NOTE — ED PROVIDER NOTE - ATTENDING CONTRIBUTION TO CARE
I personally evaluated the patient. I reviewed the Resident’s or Physician Assistant’s note (as assigned above), and agree with the findings and plan except as documented in my note.  89 y/o F with h/o HTN, hiatal hernia (following Danilo) pw NBNB nausea and vomiting starting in the middle of the night accompanied with LLQ abdominal discomfort and loose stool this am. Denies fever, chills, cough, chest pain, shortness of breath, dysuria, weakness, numbness, tingling. Rectal temp 98  Surg hx: hysterectomy  Plan: Will obtain basic labs with Lipase, check CXR, CT Ab/pelvis, UA, UCX, give Zofran/fluids and reassess  **update @1550: WBC 12. CT revealing stable large hiatal hernia, mild peripancreatic fluid. Lipase and LFTs WNL. Patient still vomiting despite multiple doses of Zofran and Reglan. I spoke to private Surgeon Dr. Carrasco- recommends admission, will review scans and see patient. Admission accepted by Dr. Saldivar.

## 2021-10-11 NOTE — ED PROVIDER NOTE - OBJECTIVE STATEMENT
89 y/o F with h/o HTN, hiatal hernia (following Danilo) pw NBNB nausea and vomiting starting in the middle of the night accompanied with LLQ abdominal discomfort and loose stool this am. Denies fever, chills, cough, chest pain, shortness of breath, dysuria, weakness, numbness, tingling.   Surg hx: hysterectomy

## 2021-10-11 NOTE — H&P ADULT - NSHPREVIEWOFSYSTEMS_GEN_ALL_CORE
REVIEW OF SYSTEMS:  CONSTITUTIONAL: No fever, weight loss, or fatigue  EYES: No eye pain, visual disturbances, or discharge  ENMT:  No difficulty hearing, tinnitus, vertigo; No sinus or throat pain  NECK: No pain or stiffness  BREASTS: No pain, masses, or nipple discharge  RESPIRATORY: No cough, wheezing, chills or hemoptysis; No shortness of breath  CARDIOVASCULAR: No chest pain, palpitations, dizziness, or leg swelling  GASTROINTESTINAL: No abdominal or epigastric pain. No nausea, vomiting, or hematemesis; No diarrhea or constipation. No melena or hematochezia.  GENITOURINARY: No dysuria, frequency, hematuria, or incontinence  NEUROLOGICAL: No headaches, memory loss, loss of strength, numbness, or tremors  SKIN: No itching, burning, rashes, or lesions   LYMPH NODES: No enlarged glands  ENDOCRINE: No heat or cold intolerance; No hair loss  MUSCULOSKELETAL: No joint pain or swelling; No muscle, back, or extremity pain  PSYCHIATRIC: No depression, anxiety, mood swings, or difficulty sleeping  HEME/LYMPH: No easy bruising, or bleeding gums  ALLERGY AND IMMUNOLOGIC: No hives or eczema    ALL ROS REVIEWED AND NORMAL EXCEPT AS STATED ABOVE REVIEW OF SYSTEMS:  CONSTITUTIONAL: No fever, weight loss, +fatigue  EYES: No eye pain, visual disturbances, or discharge  ENMT:  No difficulty hearing, tinnitus, vertigo; No sinus or throat pain  NECK: No pain or stiffness  BREASTS: No pain, masses, or nipple discharge  RESPIRATORY: No cough, wheezing, chills or hemoptysis; No shortness of breath  CARDIOVASCULAR: No chest pain, palpitations, dizziness, or leg swelling  GASTROINTESTINAL: + lower abdominal pain, no epigastric pain, +NBNB vomiting, +nausea, + loose stools, no hematemesis; No constipation. No melena or hematochezia.  GENITOURINARY: No dysuria, frequency, hematuria, or incontinence  NEUROLOGICAL: No headaches, memory loss, loss of strength, numbness, or tremors  SKIN: No itching, burning, rashes, or lesions   LYMPH NODES: No enlarged glands  ENDOCRINE: No heat or cold intolerance; No hair loss  MUSCULOSKELETAL: No joint pain or swelling; No muscle, back, or extremity pain  PSYCHIATRIC: No depression, anxiety, mood swings, or difficulty sleeping  HEME/LYMPH: No easy bruising, or bleeding gums  ALLERGY AND IMMUNOLOGIC: No hives or eczema    ALL ROS REVIEWED AND NORMAL EXCEPT AS STATED ABOVE

## 2021-10-11 NOTE — ED ADULT TRIAGE NOTE - CHIEF COMPLAINT QUOTE
as per daughter the patient has been vomiting since last night reports PMH of hiatal hernia as per daughter the patient has been vomiting since last night reports PMH of hiatal hernia. ISAR Negative.

## 2021-10-11 NOTE — ED PROVIDER NOTE - CLINICAL SUMMARY MEDICAL DECISION MAKING FREE TEXT BOX
89 y/o F with h/o HTN, hiatal hernia (following Danilo) pw NBNB nausea and vomiting starting in the middle of the night accompanied with LLQ abdominal discomfort and loose stool this am. Denies fever, chills, cough, chest pain, shortness of breath, dysuria, weakness, numbness, tingling. Rectal temp 98  Surg hx: hysterectomy  Plan: Will obtain basic labs with Lipase, check CXR, CT Ab/pelvis, UA, UCX, give Zofran/fluids and reassess 89 y/o F with h/o HTN, hiatal hernia (following Danilo) pw NBNB nausea and vomiting starting in the middle of the night accompanied with LLQ abdominal discomfort and loose stool this am. Denies fever, chills, cough, chest pain, shortness of breath, dysuria, weakness, numbness, tingling. Rectal temp 98  Surg hx: hysterectomy  Plan: Will obtain basic labs with Lipase, check CXR, CT Ab/pelvis, UA, UCX, give Zofran/fluids and reassess  **update @1550: WBC 12. CT revealing stable large hiatal hernia, mild peripancreatic fluid. Lipase and LFTs WNL. Patient still vomiting despite multiple doses of Zofran and Reglan. I spoke to private Surgeon Dr. Carrasco- recommends admission, will review scans and see patient. Admission accepted by Dr. Saldivar.

## 2021-10-11 NOTE — PATIENT PROFILE ADULT - DEAF OR HARD OF HEARING?
Recommend patient see derm for her rash.  Can call [facility] to schedule:  [phone].    Thanks!  Mara    yes

## 2021-10-11 NOTE — H&P ADULT - NSHPPHYSICALEXAM_GEN_ALL_CORE
T(C): 36.2 (10-11-21 @ 12:50), Max: 36.2 (10-11-21 @ 12:50)  HR: 115 (10-11-21 @ 12:50) (115 - 115)  BP: 138/88 (10-11-21 @ 12:50) (138/88 - 138/88)  RR: 16 (10-11-21 @ 12:50) (16 - 16)  SpO2: 97% (10-11-21 @ 12:50) (97% - 97%)  Wt(kg): --Vital Signs Last 24 Hrs  T(C): 36.2 (11 Oct 2021 12:50), Max: 36.2 (11 Oct 2021 12:50)  T(F): 97.1 (11 Oct 2021 12:50), Max: 97.1 (11 Oct 2021 12:50)  HR: 115 (11 Oct 2021 12:50) (115 - 115)  BP: 138/88 (11 Oct 2021 12:50) (138/88 - 138/88)  BP(mean): --  RR: 16 (11 Oct 2021 12:50) (16 - 16)  SpO2: 97% (11 Oct 2021 12:50) (97% - 97%)    PHYSICAL EXAM:  GENERAL: NAD, well-groomed, well-developed  HEAD:  Atraumatic, Normocephalic  EYES: EOMI, PERRLA, conjunctiva and sclera clear  ENMT: No tonsillar erythema, exudates, or enlargement; Moist mucous membranes, Good dentition, No lesions  NECK: Supple, No JVD, Normal thyroid  NERVOUS SYSTEM:  Alert & Oriented X3, Good concentration; Motor Strength 5/5 B/L upper and lower extremities; DTRs 2+ intact and symmetric  CHEST/LUNG: Clear to percussion bilaterally; No rales, rhonchi, wheezing, or rubs  HEART: Regular rate and rhythm; No murmurs, rubs, or gallops  ABDOMEN: Soft, Nontender, Nondistended; Bowel sounds present  EXTREMITIES:  2+ Peripheral Pulses, No clubbing, cyanosis, or edema  LYMPH: No lymphadenopathy noted  SKIN: No rashes or lesions T(C): 36.2 (10-11-21 @ 12:50), Max: 36.2 (10-11-21 @ 12:50)  HR: 115 (10-11-21 @ 12:50) (115 - 115)  BP: 138/88 (10-11-21 @ 12:50) (138/88 - 138/88)  RR: 16 (10-11-21 @ 12:50) (16 - 16)  SpO2: 97% (10-11-21 @ 12:50) (97% - 97%)  Wt(kg): --Vital Signs Last 24 Hrs  T(C): 36.2 (11 Oct 2021 12:50), Max: 36.2 (11 Oct 2021 12:50)  T(F): 97.1 (11 Oct 2021 12:50), Max: 97.1 (11 Oct 2021 12:50)  HR: 115 (11 Oct 2021 12:50) (115 - 115)  BP: 138/88 (11 Oct 2021 12:50) (138/88 - 138/88)  BP(mean): --  RR: 16 (11 Oct 2021 12:50) (16 - 16)  SpO2: 97% (11 Oct 2021 12:50) (97% - 97%)    PHYSICAL EXAM:  GENERAL: NAD, well-groomed, well-developed  HEAD:  Atraumatic, Normocephalic  EYES: EOMI, PERRLA, conjunctiva and sclera clear  ENMT: No tonsillar erythema, exudates, or enlargement; Moist mucous membranes, Good dentition, No lesions  NECK: Supple, No JVD, Normal thyroid  NERVOUS SYSTEM:  Alert & Oriented X3, Good concentration; Motor Strength 5/5 B/L upper and lower extremities; DTRs 2+ intact and symmetric  CHEST/LUNG: Clear to percussion bilaterally; No rales, rhonchi, wheezing, or rubs  HEART: Regular rate and rhythm; No murmurs, rubs, or gallops  ABDOMEN: Soft, not tender to palpation, Nondistended; Bowel sounds present  EXTREMITIES:  2+ Peripheral Pulses, No clubbing, cyanosis, or edema  LYMPH: No lymphadenopathy noted  SKIN: No rashes or lesions

## 2021-10-11 NOTE — H&P ADULT - NSHPLABSRESULTS_GEN_ALL_CORE
LABS:                        13.2   12.39 )-----------( 249      ( 11 Oct 2021 13:30 )             39.1     10-11    142  |  101  |  20  ----------------------------<  201<H>  3.9   |  29  |  1.36<H>    Ca    9.6      11 Oct 2021 13:30    TPro  7.4  /  Alb  3.5  /  TBili  0.9  /  DBili  x   /  AST  30  /  ALT  22  /  AlkPhos  132<H>  10-11         CAPILLARY BLOOD GLUCOSE                RADIOLOGY & ADDITIONAL TESTS:    Consultant(s) Notes Reviewed:  [x ] YES  [ ] NO  Care Discussed with Consultants/Other Providers [ x] YES  [ ] NO  Imaging Personally Reviewed:  [ ] YES  [ ] NO

## 2021-10-11 NOTE — ED PROVIDER NOTE - ABDOMINAL TENDER
PTA


-: negative.





left lower quadrant

## 2021-10-11 NOTE — H&P ADULT - HISTORY OF PRESENT ILLNESS
89 y/o F with h/o HTN, hiatal hernia (following Danilo) pw NBNB nausea and vomiting starting in the middle of the night accompanied with LLQ abdominal discomfort and loose stool this am. Denies fever, chills, cough, chest pain, shortness of breath, dysuria, weakness, numbness, tingling.   Surg hx: hysterectomy 89 y/o F with h/o HTN, hiatal hernia (following Dr. Carrasco), HLD, hypothyroidism presents with NBNB nausea and vomiting starting in the middle of the night accompanied with LLQ abdominal discomfort and loose stool this am. Denies fever, chills, cough, chest pain, shortness of breath, dysuria, weakness, numbness, tingling. Had admission for similar presentation in august 2021.

## 2021-10-11 NOTE — H&P ADULT - ASSESSMENT
89F with pmh of HTN, hiatal hernia (following Danilo) pw NBNB nausea and vomiting starting in the middle of the night accompanied with LLQ abdominal discomfort and loose stool this am. Denies fever, chills, cough, chest pain, shortness of breath, dysuria, weakness, numbness, tingling.   Surg hx: hysterectomy 89F with pmh of HTN, hiatal hernia (following Danilo), HLD, hypothyroidism presenting with NBNB nausea and vomiting. Admitted for intractable nausea and vomiting possibly secondary to hiatal hernia.     Intractable Nausea and vomiting  Leukocytosis - possibly reactive  - afebrile with no source for infection noted on admission  - CXR: neg for acute pathology, personally reviewed  - CT a/p: Colonic diverticulosis without CT evidence of acute diverticulitis. Large hiatal hernia with partially intrathoracic stomach. Mild peripancreatic inflammation, question pancreatitis.  - Lipase negative  - Dr. Carrasco consulted  - IVF  - clear liquid diet    MYNOR on CKD stage III  - IVF  - encourage PO intake  - monitor renal function    HTN  - amlodipine 5 daily  - lopressor 25 BID    HLD  - lipitor 40 qhs    Hypothyroidism  - Levothyroxine 50 mcg daily    Urinary retention  - flomax 0.4mg qhs    DVT ppx  - HSQ  - SCDs  IMPROVE VTE Individual Risk Assessment    RISK                                                                Points    [  ] Previous VTE                                                  3    [  ] Thrombophilia                                               2    [  ] Lower limb paralysis                                      2        (unable to hold up >15 seconds)      [  ] Current Cancer                                              2         (within 6 months)    [  ] Immobilization > 24 hrs                                1    [  ] ICU/CCU stay > 24 hours                              1    [ x ] Age > 60                                                      1    IMPROVE VTE Score ___1______    IMPROVE Score 0-1: Low Risk, No VTE prophylaxis required for most patients, encourage ambulation.   IMPROVE Score 2-3: At risk, pharmacologic VTE prophylaxis is indicated for most patients (in the absence of a contraindication)  IMPROVE Score > or = 4: High Risk, pharmacologic VTE prophylaxis is indicated for most patients (in the absence of a contraindication)

## 2021-10-11 NOTE — ED ADULT NURSE NOTE - NSIMPLEMENTINTERV_GEN_ALL_ED
Implemented All Fall with Harm Risk Interventions:  [place] to call system. Call bell, personal items and telephone within reach. Instruct patient to call for assistance. Room bathroom lighting operational. Non-slip footwear when patient is off stretcher. Physically safe environment: no spills, clutter or unnecessary equipment. Stretcher in lowest position, wheels locked, appropriate side rails in place. Provide visual cue, wrist band, yellow gown, etc. Monitor gait and stability. Monitor for mental status changes and reorient to person, place, and time. Review medications for side effects contributing to fall risk. Reinforce activity limits and safety measures with patient and family. Provide visual clues: red socks.

## 2021-10-11 NOTE — ED ADULT NURSE NOTE - OBJECTIVE STATEMENT
Patient presents to ED with c/o vomiting since this morning. Pt seen in ED in August and was diagnosed with a hiatal hernia. Pt denies abd pain, constipation, diarrhea, fevers, chills, chest pain, SOB.

## 2021-10-12 ENCOUNTER — TRANSCRIPTION ENCOUNTER (OUTPATIENT)
Age: 86
End: 2021-10-12

## 2021-10-12 DIAGNOSIS — R11.2 NAUSEA WITH VOMITING, UNSPECIFIED: ICD-10-CM

## 2021-10-12 LAB
A1C WITH ESTIMATED AVERAGE GLUCOSE RESULT: 5.7 % — HIGH (ref 4–5.6)
ALBUMIN SERPL ELPH-MCNC: 2.6 G/DL — LOW (ref 3.3–5)
ALP SERPL-CCNC: 101 U/L — SIGNIFICANT CHANGE UP (ref 40–120)
ALT FLD-CCNC: 18 U/L — SIGNIFICANT CHANGE UP (ref 10–45)
ANION GAP SERPL CALC-SCNC: 9 MMOL/L — SIGNIFICANT CHANGE UP (ref 5–17)
AST SERPL-CCNC: 35 U/L — SIGNIFICANT CHANGE UP (ref 10–40)
BASOPHILS # BLD AUTO: 0.04 K/UL — SIGNIFICANT CHANGE UP (ref 0–0.2)
BASOPHILS NFR BLD AUTO: 0.4 % — SIGNIFICANT CHANGE UP (ref 0–2)
BILIRUB SERPL-MCNC: 0.7 MG/DL — SIGNIFICANT CHANGE UP (ref 0.2–1.2)
BUN SERPL-MCNC: 26 MG/DL — HIGH (ref 7–23)
CALCIUM SERPL-MCNC: 8 MG/DL — LOW (ref 8.4–10.5)
CHLORIDE SERPL-SCNC: 105 MMOL/L — SIGNIFICANT CHANGE UP (ref 96–108)
CHOLEST SERPL-MCNC: 140 MG/DL — SIGNIFICANT CHANGE UP
CO2 SERPL-SCNC: 24 MMOL/L — SIGNIFICANT CHANGE UP (ref 22–31)
COVID-19 SPIKE DOMAIN AB INTERP: POSITIVE
COVID-19 SPIKE DOMAIN ANTIBODY RESULT: >250 U/ML — HIGH
CREAT SERPL-MCNC: 1.1 MG/DL — SIGNIFICANT CHANGE UP (ref 0.5–1.3)
EOSINOPHIL # BLD AUTO: 0.05 K/UL — SIGNIFICANT CHANGE UP (ref 0–0.5)
EOSINOPHIL NFR BLD AUTO: 0.5 % — SIGNIFICANT CHANGE UP (ref 0–6)
ESTIMATED AVERAGE GLUCOSE: 117 MG/DL — HIGH (ref 68–114)
GLUCOSE SERPL-MCNC: 87 MG/DL — SIGNIFICANT CHANGE UP (ref 70–99)
HCT VFR BLD CALC: 30.2 % — LOW (ref 34.5–45)
HDLC SERPL-MCNC: 49 MG/DL — LOW
HGB BLD-MCNC: 10.2 G/DL — LOW (ref 11.5–15.5)
IMM GRANULOCYTES NFR BLD AUTO: 0.4 % — SIGNIFICANT CHANGE UP (ref 0–1.5)
LIPID PNL WITH DIRECT LDL SERPL: 79 MG/DL — SIGNIFICANT CHANGE UP
LYMPHOCYTES # BLD AUTO: 2.01 K/UL — SIGNIFICANT CHANGE UP (ref 1–3.3)
LYMPHOCYTES # BLD AUTO: 21.1 % — SIGNIFICANT CHANGE UP (ref 13–44)
MCHC RBC-ENTMCNC: 30.9 PG — SIGNIFICANT CHANGE UP (ref 27–34)
MCHC RBC-ENTMCNC: 33.8 GM/DL — SIGNIFICANT CHANGE UP (ref 32–36)
MCV RBC AUTO: 91.5 FL — SIGNIFICANT CHANGE UP (ref 80–100)
MONOCYTES # BLD AUTO: 0.6 K/UL — SIGNIFICANT CHANGE UP (ref 0–0.9)
MONOCYTES NFR BLD AUTO: 6.3 % — SIGNIFICANT CHANGE UP (ref 2–14)
NEUTROPHILS # BLD AUTO: 6.79 K/UL — SIGNIFICANT CHANGE UP (ref 1.8–7.4)
NEUTROPHILS NFR BLD AUTO: 71.3 % — SIGNIFICANT CHANGE UP (ref 43–77)
NON HDL CHOLESTEROL: 91 MG/DL — SIGNIFICANT CHANGE UP
NRBC # BLD: 0 /100 WBCS — SIGNIFICANT CHANGE UP (ref 0–0)
PLATELET # BLD AUTO: 176 K/UL — SIGNIFICANT CHANGE UP (ref 150–400)
POTASSIUM SERPL-MCNC: 3.6 MMOL/L — SIGNIFICANT CHANGE UP (ref 3.5–5.3)
POTASSIUM SERPL-SCNC: 3.6 MMOL/L — SIGNIFICANT CHANGE UP (ref 3.5–5.3)
PROT SERPL-MCNC: 5.8 G/DL — LOW (ref 6–8.3)
RBC # BLD: 3.3 M/UL — LOW (ref 3.8–5.2)
RBC # FLD: 14.7 % — HIGH (ref 10.3–14.5)
SARS-COV-2 IGG+IGM SERPL QL IA: >250 U/ML — HIGH
SARS-COV-2 IGG+IGM SERPL QL IA: POSITIVE
SODIUM SERPL-SCNC: 138 MMOL/L — SIGNIFICANT CHANGE UP (ref 135–145)
TRIGL SERPL-MCNC: 58 MG/DL — SIGNIFICANT CHANGE UP
WBC # BLD: 9.53 K/UL — SIGNIFICANT CHANGE UP (ref 3.8–10.5)
WBC # FLD AUTO: 9.53 K/UL — SIGNIFICANT CHANGE UP (ref 3.8–10.5)

## 2021-10-12 PROCEDURE — 99233 SBSQ HOSP IP/OBS HIGH 50: CPT

## 2021-10-12 RX ORDER — PANTOPRAZOLE SODIUM 20 MG/1
40 TABLET, DELAYED RELEASE ORAL
Refills: 0 | Status: DISCONTINUED | OUTPATIENT
Start: 2021-10-12 | End: 2021-10-15

## 2021-10-12 RX ADMIN — SODIUM CHLORIDE 50 MILLILITER(S): 9 INJECTION, SOLUTION INTRAVENOUS at 21:15

## 2021-10-12 RX ADMIN — AMLODIPINE BESYLATE 5 MILLIGRAM(S): 2.5 TABLET ORAL at 17:18

## 2021-10-12 RX ADMIN — Medication 25 MILLIGRAM(S): at 05:29

## 2021-10-12 RX ADMIN — ATORVASTATIN CALCIUM 40 MILLIGRAM(S): 80 TABLET, FILM COATED ORAL at 21:14

## 2021-10-12 RX ADMIN — HEPARIN SODIUM 5000 UNIT(S): 5000 INJECTION INTRAVENOUS; SUBCUTANEOUS at 05:29

## 2021-10-12 RX ADMIN — Medication 81 MILLIGRAM(S): at 11:06

## 2021-10-12 RX ADMIN — TAMSULOSIN HYDROCHLORIDE 0.4 MILLIGRAM(S): 0.4 CAPSULE ORAL at 21:14

## 2021-10-12 RX ADMIN — Medication 25 MILLIGRAM(S): at 17:18

## 2021-10-12 RX ADMIN — Medication 50 MICROGRAM(S): at 05:29

## 2021-10-12 RX ADMIN — HEPARIN SODIUM 5000 UNIT(S): 5000 INJECTION INTRAVENOUS; SUBCUTANEOUS at 11:06

## 2021-10-12 NOTE — CONSULT NOTE ADULT - ASSESSMENT
89 y/o female pmh HTN, HLD, Hypothyroid, hiatal hernia, rectal cancer ( 25 years ago per daughter) who is admitted with intractable nausea/vomiting.
IMPRESSION: Gastric atony - no evidence of any surgical problem                      Hiatal hernia - chronic     PLAN: Begin PO fluids

## 2021-10-12 NOTE — PROGRESS NOTE ADULT - ASSESSMENT
89F with pmh of HTN, hiatal hernia (following Danilo), HLD, hypothyroidism presenting with NBNB nausea and vomiting. Admitted for intractable nausea and vomiting possibly secondary to hiatal hernia.     Intractable Nausea and vomiting  Leukocytosis - possibly reactive  - afebrile with no source for infection noted on admission  - CXR: neg for acute pathology, personally reviewed  - CT a/p: Colonic diverticulosis without CT evidence of acute diverticulitis. Large hiatal hernia with partially intrathoracic stomach. Mild peripancreatic inflammation, question pancreatitis.  - Lipase negative  - Dr. Carrasco consulted  - IVF  - clear liquid diet, will advance as tolerated    MYNOR on CKD stage III  - cont  IVF until has adequate PO intake @50cc/hr  - encourage PO intake  - monitor renal function    HTN  - amlodipine 5 daily  - lopressor 25 BID    HLD  - lipitor 40 qhs    Hypothyroidism  - Levothyroxine 50 mcg daily    Urinary retention  - flomax 0.4mg qhs    DVT ppx  - HSQ  - SCDs   89F with pmh of HTN, hiatal hernia (following Danilo), HLD, hypothyroidism presenting with NBNB nausea and vomiting. Admitted for intractable nausea and vomiting possibly secondary to hiatal hernia.     Intractable Nausea and vomiting - improving.  Leukocytosis - likely reactive, resolved today.  - afebrile with no source for infection noted on admission  - CXR: neg for acute pathology, personally reviewed  - CT a/p: Colonic diverticulosis without CT evidence of acute diverticulitis. Large hiatal hernia with partially intrathoracic stomach. Mild peripancreatic inflammation, question pancreatitis.  - Lipase negative  - Dr. Carrasco consulted - advance pt's diet as tolerated.   - IVF  - clear liquid diet, will advance as tolerated. likely to solids tomorrow.     MYNOR on CKD stage III  - cont  IVF until has adequate PO intake @50cc/hr  - encourage PO intake  - monitor renal function    HTN  - amlodipine 5 daily  - lopressor 25 BID    HLD  - lipitor 40 qhs    Hypothyroidism  - Levothyroxine 50 mcg daily    Urinary retention  - flomax 0.4mg qhs    DVT ppx  - HSQ  - SCDs   89F with pmh of HTN, hiatal hernia (following aDnilo), HLD, hypothyroidism presenting with NBNB nausea and vomiting. Admitted for intractable nausea and vomiting possibly secondary to hiatal hernia.     Intractable Nausea and vomiting - improving.  Leukocytosis - likely reactive, resolved today.  - afebrile with no source for infection noted on admission  - CXR: neg for acute pathology, personally reviewed  - CT a/p: Colonic diverticulosis without CT evidence of acute diverticulitis. Large hiatal hernia with partially intrathoracic stomach. Mild peripancreatic inflammation, question pancreatitis.  - Lipase negative  - Dr. Carrasco consulted - advance pt's diet as tolerated.   - Consulted GI, spoke with Ashlee JENKINS. Endoscopy likely tomorrow. Start PPI.   - IVF  - clear liquid diet, will advance as tolerated. likely to solids tomorrow.     MYNOR on CKD stage III  - cont  IVF until has adequate PO intake @50cc/hr  - encourage PO intake  - monitor renal function    HTN  - amlodipine 5 daily  - lopressor 25 BID    HLD  - lipitor 40 qhs    Hypothyroidism  - Levothyroxine 50 mcg daily    Urinary retention  - flomax 0.4mg qhs    DVT ppx  - HSQ  - SCDs   89F with pmh of HTN, hiatal hernia (following Danilo), HLD, hypothyroidism presenting with NBNB nausea and vomiting. Admitted for intractable nausea and vomiting possibly secondary to hiatal hernia.     Intractable Nausea and vomiting - improving.  Leukocytosis - likely reactive, resolved today.  - afebrile with no source for infection noted on admission  - CXR: neg for acute pathology, personally reviewed  - CT a/p: Colonic diverticulosis without CT evidence of acute diverticulitis. Large hiatal hernia with partially intrathoracic stomach. Mild peripancreatic inflammation, question pancreatitis.  - Lipase negative  - Dr. Carrasco consulted - advance pt's diet as tolerated.   - Consulted GI, spoke with Ashlee JENKINS. Endoscopy likely tomorrow. Start PPI.   - IVF  - clear liquid diet, will advance as tolerated. likely to solids tomorrow.     MYNOR on CKD stage III  - cont  IVF until has adequate PO intake @50cc/hr  - encourage PO intake  - monitor renal function    HTN  - amlodipine 5 daily  - lopressor 25 BID    HLD  - lipitor 40 qhs    Hypothyroidism  - Levothyroxine 50 mcg daily    Urinary retention  - flomax 0.4mg qhs    DVT ppx  - HSQ  - SCDs    Contact: updated the patient's daughter at the bedside.

## 2021-10-12 NOTE — DISCHARGE NOTE NURSING/CASE MANAGEMENT/SOCIAL WORK - PATIENT PORTAL LINK FT
You can access the FollowMyHealth Patient Portal offered by [facility] by registering at the following website: [email or web address]. By joining [employer]’s FollowMyHealth portal, you will also be able to view your health information using other applications (apps) compatible with our system.

## 2021-10-12 NOTE — CONSULT NOTE ADULT - PROBLEM SELECTOR RECOMMENDATION 9
H/O recent gastric outlet obstruction  recommend EGD   Start PPI daily   NPO after midnight  COVID swab within timeframe for procedure

## 2021-10-12 NOTE — PROGRESS NOTE ADULT - SUBJECTIVE AND OBJECTIVE BOX
Patient is a 89y old  Female who presents with a chief complaint of Intractable nausea and vomiting (12 Oct 2021 08:03)      24 HOUR EVENTS:  No overnight events reported.     SUBJECTIVE:  Patient seen and examined at bedside.     ALLERGIES:  cefuroxime (Dystonic RXN)  hydrochlorothiazide (Anaphylaxis; Angioedema)  levofloxacin (Flushing)  sulfamethoxazole (Unknown)    MEDICATIONS  (STANDING):  amLODIPine   Tablet 5 milliGRAM(s) Oral Once  aspirin enteric coated 81 milliGRAM(s) Oral daily  atorvastatin 40 milliGRAM(s) Oral at bedtime  heparin   Injectable 5000 Unit(s) SubCutaneous every 8 hours  levothyroxine 50 MICROGram(s) Oral daily  metoprolol tartrate 25 milliGRAM(s) Oral two times a day  sodium chloride 0.45%. 1000 milliLiter(s) (50 mL/Hr) IV Continuous <Continuous>  tamsulosin 0.4 milliGRAM(s) Oral at bedtime    MEDICATIONS  (PRN):  acetaminophen   Tablet .. 650 milliGRAM(s) Oral every 6 hours PRN Temp greater or equal to 38C (100.4F), Mild Pain (1 - 3)  aluminum hydroxide/magnesium hydroxide/simethicone Suspension 30 milliLiter(s) Oral every 4 hours PRN Dyspepsia  melatonin 3 milliGRAM(s) Oral at bedtime PRN Insomnia  ondansetron Injectable 4 milliGRAM(s) IV Push every 8 hours PRN Nausea and/or Vomiting    Vital Signs Last 24 Hrs  T(F): 98.2 (12 Oct 2021 11:00), Max: 99 (11 Oct 2021 18:43)  HR: 74 (12 Oct 2021 11:00) (74 - 100)  BP: 102/55 (12 Oct 2021 11:00) (102/55 - 139/73)  RR: 15 (12 Oct 2021 11:00) (15 - 18)  SpO2: 98% (12 Oct 2021 11:00) (93% - 98%)  I&O's Summary    12 Oct 2021 07:01  -  12 Oct 2021 13:34  --------------------------------------------------------  IN: 620 mL / OUT: 0 mL / NET: 620 mL      PHYSICAL EXAM:  General: NAD, A/O x 3  ENT: Moist mucous membranes, no thrush  Neck: Supple, No JVD  Lungs: Clear to auscultation bilaterally, good air entry, non-labored breathing  Cardio: RRR, S1/S2, No murmur  Abdomen: Soft, Nontender, Nondistended; Bowel sounds present  Extremities: No calf tenderness, No pitting edema    LABS:                        10.2   9.53  )-----------( 176      ( 12 Oct 2021 07:10 )             30.2     10-12    138  |  105  |  26  ----------------------------<  87  3.6   |  24  |  1.10    Ca    8.0      12 Oct 2021 07:10    TPro  5.8  /  Alb  2.6  /  TBili  0.7  /  DBili  x   /  AST  35  /  ALT  18  /  AlkPhos  101  10-12      Lipase, Serum: 131 U/L (10-11-21 @ 13:30)    eGFR if Non African American: 43 mL/min/1.73M2 (10-12-21 @ 07:10)  eGFR if African American: 50 mL/min/1.73M2 (10-12-21 @ 07:10)              10-12 Chol 140 mg/dL LDL -- HDL 49 mg/dL Trig 58 mg/dL                      COVID-19 PCR: NotDetec (10-11-21 @ 16:05)    RADIOLOGY & ADDITIONAL TESTS:    Care Discussed with Consultants/Other Providers:    Patient is a 89y old  Female who presents with a chief complaint of Intractable nausea and vomiting (12 Oct 2021 08:03)      24 HOUR EVENTS:  No overnight events reported.     SUBJECTIVE:  Patient seen and examined at bedside. She denies having any current nausea and vomiting. Denies hav    ALLERGIES:  cefuroxime (Dystonic RXN)  hydrochlorothiazide (Anaphylaxis; Angioedema)  levofloxacin (Flushing)  sulfamethoxazole (Unknown)    MEDICATIONS  (STANDING):  amLODIPine   Tablet 5 milliGRAM(s) Oral Once  aspirin enteric coated 81 milliGRAM(s) Oral daily  atorvastatin 40 milliGRAM(s) Oral at bedtime  heparin   Injectable 5000 Unit(s) SubCutaneous every 8 hours  levothyroxine 50 MICROGram(s) Oral daily  metoprolol tartrate 25 milliGRAM(s) Oral two times a day  sodium chloride 0.45%. 1000 milliLiter(s) (50 mL/Hr) IV Continuous <Continuous>  tamsulosin 0.4 milliGRAM(s) Oral at bedtime    MEDICATIONS  (PRN):  acetaminophen   Tablet .. 650 milliGRAM(s) Oral every 6 hours PRN Temp greater or equal to 38C (100.4F), Mild Pain (1 - 3)  aluminum hydroxide/magnesium hydroxide/simethicone Suspension 30 milliLiter(s) Oral every 4 hours PRN Dyspepsia  melatonin 3 milliGRAM(s) Oral at bedtime PRN Insomnia  ondansetron Injectable 4 milliGRAM(s) IV Push every 8 hours PRN Nausea and/or Vomiting    Vital Signs Last 24 Hrs  T(F): 98.2 (12 Oct 2021 11:00), Max: 99 (11 Oct 2021 18:43)  HR: 74 (12 Oct 2021 11:00) (74 - 100)  BP: 102/55 (12 Oct 2021 11:00) (102/55 - 139/73)  RR: 15 (12 Oct 2021 11:00) (15 - 18)  SpO2: 98% (12 Oct 2021 11:00) (93% - 98%)  I&O's Summary    12 Oct 2021 07:01  -  12 Oct 2021 13:34  --------------------------------------------------------  IN: 620 mL / OUT: 0 mL / NET: 620 mL      PHYSICAL EXAM:  General: NAD, A/O x 3  ENT: Moist mucous membranes, no thrush  Neck: Supple, No JVD  Lungs: Clear to auscultation bilaterally, good air entry, non-labored breathing  Cardio: RRR, S1/S2, No murmur  Abdomen: Soft, Nontender, Nondistended; Bowel sounds present  Extremities: No calf tenderness, No pitting edema    LABS:                        10.2   9.53  )-----------( 176      ( 12 Oct 2021 07:10 )             30.2     10-12    138  |  105  |  26  ----------------------------<  87  3.6   |  24  |  1.10    Ca    8.0      12 Oct 2021 07:10    TPro  5.8  /  Alb  2.6  /  TBili  0.7  /  DBili  x   /  AST  35  /  ALT  18  /  AlkPhos  101  10-12      Lipase, Serum: 131 U/L (10-11-21 @ 13:30)    eGFR if Non African American: 43 mL/min/1.73M2 (10-12-21 @ 07:10)  eGFR if African American: 50 mL/min/1.73M2 (10-12-21 @ 07:10)              10-12 Chol 140 mg/dL LDL -- HDL 49 mg/dL Trig 58 mg/dL                      COVID-19 PCR: NotDetec (10-11-21 @ 16:05)    RADIOLOGY & ADDITIONAL TESTS:    Care Discussed with Consultants/Other Providers:    Patient is a 89y old  Female who presents with a chief complaint of Intractable nausea and vomiting (12 Oct 2021 08:03)      24 HOUR EVENTS:  No overnight events reported.   admitted    SUBJECTIVE:  Patient seen and examined at bedside. She denies having any current nausea and vomiting. Denies having current abdominal pain. patient is forgetful - not sure who brought her to the hospital or what happened.     ALLERGIES:  cefuroxime (Dystonic RXN)  hydrochlorothiazide (Anaphylaxis; Angioedema)  levofloxacin (Flushing)  sulfamethoxazole (Unknown)    MEDICATIONS  (STANDING):  amLODIPine   Tablet 5 milliGRAM(s) Oral Once  aspirin enteric coated 81 milliGRAM(s) Oral daily  atorvastatin 40 milliGRAM(s) Oral at bedtime  heparin   Injectable 5000 Unit(s) SubCutaneous every 8 hours  levothyroxine 50 MICROGram(s) Oral daily  metoprolol tartrate 25 milliGRAM(s) Oral two times a day  sodium chloride 0.45%. 1000 milliLiter(s) (50 mL/Hr) IV Continuous <Continuous>  tamsulosin 0.4 milliGRAM(s) Oral at bedtime    MEDICATIONS  (PRN):  acetaminophen   Tablet .. 650 milliGRAM(s) Oral every 6 hours PRN Temp greater or equal to 38C (100.4F), Mild Pain (1 - 3)  aluminum hydroxide/magnesium hydroxide/simethicone Suspension 30 milliLiter(s) Oral every 4 hours PRN Dyspepsia  melatonin 3 milliGRAM(s) Oral at bedtime PRN Insomnia  ondansetron Injectable 4 milliGRAM(s) IV Push every 8 hours PRN Nausea and/or Vomiting    Vital Signs Last 24 Hrs  T(F): 98.2 (12 Oct 2021 11:00), Max: 99 (11 Oct 2021 18:43)  HR: 74 (12 Oct 2021 11:00) (74 - 100)  BP: 102/55 (12 Oct 2021 11:00) (102/55 - 139/73)  RR: 15 (12 Oct 2021 11:00) (15 - 18)  SpO2: 98% (12 Oct 2021 11:00) (93% - 98%)  I&O's Summary    12 Oct 2021 07:01  -  12 Oct 2021 13:34  --------------------------------------------------------  IN: 620 mL / OUT: 0 mL / NET: 620 mL      PHYSICAL EXAM:  General: NAD, A/O to self. not agitated. cooperative. well groomed.   ENT: Moist mucous membranes, no thrush  Neck: Supple, No JVD  Lungs: Clear to auscultation bilaterally, good air entry, non-labored breathing  Cardio: RRR, S1/S2, No murmur  Abdomen: Soft, Nontender, Nondistended; Bowel sounds present  Extremities: No calf tenderness, No pitting edema    LABS:                        10.2   9.53  )-----------( 176      ( 12 Oct 2021 07:10 )             30.2     10-12    138  |  105  |  26  ----------------------------<  87  3.6   |  24  |  1.10    Ca    8.0      12 Oct 2021 07:10    TPro  5.8  /  Alb  2.6  /  TBili  0.7  /  DBili  x   /  AST  35  /  ALT  18  /  AlkPhos  101  10-12      Lipase, Serum: 131 U/L (10-11-21 @ 13:30)    eGFR if Non African American: 43 mL/min/1.73M2 (10-12-21 @ 07:10)  eGFR if African American: 50 mL/min/1.73M2 (10-12-21 @ 07:10)              10-12 Chol 140 mg/dL LDL -- HDL 49 mg/dL Trig 58 mg/dL            COVID-19 PCR: NotDetec (10-11-21 @ 16:05)    RADIOLOGY & ADDITIONAL TESTS:    < from: Xray Chest 1 View AP/PA (10.11.21 @ 13:48) >    IMPRESSION: No evidence for focal infiltrate or lobar consolidation. Hiatal hernia.    < end of copied text >    CXR read by me without evidence of infiltrate.    < from: CT Abdomen and Pelvis No Cont (10.11.21 @ 14:54) >    IMPRESSION:    1. Colonic diverticulosiswithout CT evidence of acute diverticulitis.  2. Large hiatal hernia with partially intrathoracic stomach, similar to prior study.  3. Mild peripancreatic inflammation, question pancreatitis.    < end of copied text >        Care Discussed with Consultants/Other Providers:   Dr. Carrasco, General Surgery  Ashlee, GI NP

## 2021-10-12 NOTE — DISCHARGE NOTE NURSING/CASE MANAGEMENT/SOCIAL WORK - NSDCFUADDAPPT_GEN_ALL_CORE_FT
We made you a hospital follow-up visit with Dr. Polo on 10/21/2021 at 11:30am, office #[phone].

## 2021-10-12 NOTE — CONSULT NOTE ADULT - TIME BILLING
Discussion regarding all options and risks ;all lab values and imaging studies reviewed; discussed with Medicine

## 2021-10-13 LAB
ANION GAP SERPL CALC-SCNC: 7 MMOL/L — SIGNIFICANT CHANGE UP (ref 5–17)
BUN SERPL-MCNC: 16 MG/DL — SIGNIFICANT CHANGE UP (ref 7–23)
CALCIUM SERPL-MCNC: 8.4 MG/DL — SIGNIFICANT CHANGE UP (ref 8.4–10.5)
CHLORIDE SERPL-SCNC: 108 MMOL/L — SIGNIFICANT CHANGE UP (ref 96–108)
CO2 SERPL-SCNC: 27 MMOL/L — SIGNIFICANT CHANGE UP (ref 22–31)
CREAT SERPL-MCNC: 0.94 MG/DL — SIGNIFICANT CHANGE UP (ref 0.5–1.3)
CULTURE RESULTS: SIGNIFICANT CHANGE UP
GLUCOSE SERPL-MCNC: 91 MG/DL — SIGNIFICANT CHANGE UP (ref 70–99)
HCT VFR BLD CALC: 30.9 % — LOW (ref 34.5–45)
HGB BLD-MCNC: 10.3 G/DL — LOW (ref 11.5–15.5)
MCHC RBC-ENTMCNC: 30.7 PG — SIGNIFICANT CHANGE UP (ref 27–34)
MCHC RBC-ENTMCNC: 33.3 GM/DL — SIGNIFICANT CHANGE UP (ref 32–36)
MCV RBC AUTO: 92 FL — SIGNIFICANT CHANGE UP (ref 80–100)
NRBC # BLD: 0 /100 WBCS — SIGNIFICANT CHANGE UP (ref 0–0)
PLATELET # BLD AUTO: 182 K/UL — SIGNIFICANT CHANGE UP (ref 150–400)
POTASSIUM SERPL-MCNC: 4.1 MMOL/L — SIGNIFICANT CHANGE UP (ref 3.5–5.3)
POTASSIUM SERPL-SCNC: 4.1 MMOL/L — SIGNIFICANT CHANGE UP (ref 3.5–5.3)
RBC # BLD: 3.36 M/UL — LOW (ref 3.8–5.2)
RBC # FLD: 14.4 % — SIGNIFICANT CHANGE UP (ref 10.3–14.5)
SODIUM SERPL-SCNC: 142 MMOL/L — SIGNIFICANT CHANGE UP (ref 135–145)
SPECIMEN SOURCE: SIGNIFICANT CHANGE UP
WBC # BLD: 8.08 K/UL — SIGNIFICANT CHANGE UP (ref 3.8–10.5)
WBC # FLD AUTO: 8.08 K/UL — SIGNIFICANT CHANGE UP (ref 3.8–10.5)

## 2021-10-13 PROCEDURE — 99233 SBSQ HOSP IP/OBS HIGH 50: CPT

## 2021-10-13 RX ORDER — METOPROLOL TARTRATE 50 MG
25 TABLET ORAL ONCE
Refills: 0 | Status: COMPLETED | OUTPATIENT
Start: 2021-10-13 | End: 2021-10-13

## 2021-10-13 RX ORDER — METOPROLOL TARTRATE 50 MG
25 TABLET ORAL EVERY 6 HOURS
Refills: 0 | Status: DISCONTINUED | OUTPATIENT
Start: 2021-10-13 | End: 2021-10-15

## 2021-10-13 RX ADMIN — Medication 81 MILLIGRAM(S): at 22:21

## 2021-10-13 RX ADMIN — PANTOPRAZOLE SODIUM 40 MILLIGRAM(S): 20 TABLET, DELAYED RELEASE ORAL at 06:09

## 2021-10-13 RX ADMIN — Medication 25 MILLIGRAM(S): at 05:03

## 2021-10-13 RX ADMIN — ATORVASTATIN CALCIUM 40 MILLIGRAM(S): 80 TABLET, FILM COATED ORAL at 22:21

## 2021-10-13 RX ADMIN — Medication 25 MILLIGRAM(S): at 18:07

## 2021-10-13 RX ADMIN — Medication 50 MICROGRAM(S): at 05:03

## 2021-10-13 RX ADMIN — TAMSULOSIN HYDROCHLORIDE 0.4 MILLIGRAM(S): 0.4 CAPSULE ORAL at 22:21

## 2021-10-13 NOTE — PROGRESS NOTE ADULT - SUBJECTIVE AND OBJECTIVE BOX
Patient is a 89y old  Female who presents with a chief complaint of Intractable nausea and vomiting (12 Oct 2021 15:01)      24 HOUR EVENTS:  No overnight events reported.     SUBJECTIVE:  Patient seen and examined at bedside.     ALLERGIES:  cefuroxime (Dystonic RXN)  hydrochlorothiazide (Anaphylaxis; Angioedema)  levofloxacin (Flushing)  sulfamethoxazole (Unknown)    MEDICATIONS  (STANDING):  aspirin enteric coated 81 milliGRAM(s) Oral daily  atorvastatin 40 milliGRAM(s) Oral at bedtime  levothyroxine 50 MICROGram(s) Oral daily  metoprolol tartrate 25 milliGRAM(s) Oral two times a day  pantoprazole    Tablet 40 milliGRAM(s) Oral before breakfast  sodium chloride 0.45%. 1000 milliLiter(s) (50 mL/Hr) IV Continuous <Continuous>  tamsulosin 0.4 milliGRAM(s) Oral at bedtime    MEDICATIONS  (PRN):  acetaminophen   Tablet .. 650 milliGRAM(s) Oral every 6 hours PRN Temp greater or equal to 38C (100.4F), Mild Pain (1 - 3)  aluminum hydroxide/magnesium hydroxide/simethicone Suspension 30 milliLiter(s) Oral every 4 hours PRN Dyspepsia  melatonin 3 milliGRAM(s) Oral at bedtime PRN Insomnia  ondansetron Injectable 4 milliGRAM(s) IV Push every 8 hours PRN Nausea and/or Vomiting    Vital Signs Last 24 Hrs  T(F): 97.6 (13 Oct 2021 04:58), Max: 98.6 (12 Oct 2021 19:45)  HR: 94 (13 Oct 2021 04:58) (67 - 94)  BP: 111/63 (13 Oct 2021 04:58) (111/63 - 124/73)  RR: 16 (13 Oct 2021 04:58) (15 - 18)  SpO2: 96% (13 Oct 2021 04:58) (96% - 98%)  I&O's Summary    12 Oct 2021 07:01  -  13 Oct 2021 07:00  --------------------------------------------------------  IN: 620 mL / OUT: 0 mL / NET: 620 mL      PHYSICAL EXAM:  General: NAD, A/O x 3  ENT: Moist mucous membranes, no thrush  Neck: Supple, No JVD  Lungs: Clear to auscultation bilaterally, good air entry, non-labored breathing  Cardio: RRR, S1/S2, No murmur  Abdomen: Soft, Nontender, Nondistended; Bowel sounds present  Extremities: No calf tenderness, No pitting edema    LABS:                        10.3   8.08  )-----------( 182      ( 13 Oct 2021 06:20 )             30.9     10-13    142  |  108  |  16  ----------------------------<  91  4.1   |  27  |  0.94    Ca    8.4      13 Oct 2021 06:20    TPro  5.8  /  Alb  2.6  /  TBili  0.7  /  DBili  x   /  AST  35  /  ALT  18  /  AlkPhos  101  10-12      Lipase, Serum: 131 U/L (10-11-21 @ 13:30)    eGFR if Non African American: 52 mL/min/1.73M2 (10-13-21 @ 06:20)  eGFR if African American: 61 mL/min/1.73M2 (10-13-21 @ 06:20)              10-12 Chol 140 mg/dL LDL -- HDL 49 mg/dL Trig 58 mg/dL                      Culture - Urine (collected 11 Oct 2021 12:40)  Source: Clean Catch Clean Catch (Midstream)  Final Report (13 Oct 2021 11:28):    <10,000 CFU/mL Normal Urogenital Bri      COVID-19 PCR: NotDetec (10-11-21 @ 16:05)    RADIOLOGY & ADDITIONAL TESTS:    Care Discussed with Consultants/Other Providers:    Patient is a 89y old  Female who presents with a chief complaint of Intractable nausea and vomiting (12 Oct 2021 15:01)      24 HOUR EVENTS:  No overnight events reported.     SUBJECTIVE:  Patient seen and examined at bedside.   No acute complaints.    ALLERGIES:  cefuroxime (Dystonic RXN)  hydrochlorothiazide (Anaphylaxis; Angioedema)  levofloxacin (Flushing)  sulfamethoxazole (Unknown)    MEDICATIONS  (STANDING):  aspirin enteric coated 81 milliGRAM(s) Oral daily  atorvastatin 40 milliGRAM(s) Oral at bedtime  levothyroxine 50 MICROGram(s) Oral daily  metoprolol tartrate 25 milliGRAM(s) Oral two times a day  pantoprazole    Tablet 40 milliGRAM(s) Oral before breakfast  sodium chloride 0.45%. 1000 milliLiter(s) (50 mL/Hr) IV Continuous <Continuous>  tamsulosin 0.4 milliGRAM(s) Oral at bedtime    MEDICATIONS  (PRN):  acetaminophen   Tablet .. 650 milliGRAM(s) Oral every 6 hours PRN Temp greater or equal to 38C (100.4F), Mild Pain (1 - 3)  aluminum hydroxide/magnesium hydroxide/simethicone Suspension 30 milliLiter(s) Oral every 4 hours PRN Dyspepsia  melatonin 3 milliGRAM(s) Oral at bedtime PRN Insomnia  ondansetron Injectable 4 milliGRAM(s) IV Push every 8 hours PRN Nausea and/or Vomiting    Vital Signs Last 24 Hrs  T(F): 97.6 (13 Oct 2021 04:58), Max: 98.6 (12 Oct 2021 19:45)  HR: 94 (13 Oct 2021 04:58) (67 - 94)  BP: 111/63 (13 Oct 2021 04:58) (111/63 - 124/73)  RR: 16 (13 Oct 2021 04:58) (15 - 18)  SpO2: 96% (13 Oct 2021 04:58) (96% - 98%)  I&O's Summary    12 Oct 2021 07:01  -  13 Oct 2021 07:00  --------------------------------------------------------  IN: 620 mL / OUT: 0 mL / NET: 620 mL      PHYSICAL EXAM:  General: NAD, A/O to self, intermittently forgetful.   ENT: Moist mucous membranes, no thrush  Neck: Supple, No JVD  Lungs: Clear to auscultation bilaterally, good air entry, non-labored breathing  Cardio: RRR, S1/S2, No murmur  Abdomen: Soft, Nontender, Nondistended; Bowel sounds present  Extremities: No calf tenderness, No pitting edema    LABS:                        10.3   8.08  )-----------( 182      ( 13 Oct 2021 06:20 )             30.9     10-13    142  |  108  |  16  ----------------------------<  91  4.1   |  27  |  0.94    Ca    8.4      13 Oct 2021 06:20    TPro  5.8  /  Alb  2.6  /  TBili  0.7  /  DBili  x   /  AST  35  /  ALT  18  /  AlkPhos  101  10-12      Lipase, Serum: 131 U/L (10-11-21 @ 13:30)    eGFR if Non African American: 52 mL/min/1.73M2 (10-13-21 @ 06:20)  eGFR if African American: 61 mL/min/1.73M2 (10-13-21 @ 06:20)              10-12 Chol 140 mg/dL LDL -- HDL 49 mg/dL Trig 58 mg/dL                      Culture - Urine (collected 11 Oct 2021 12:40)  Source: Clean Catch Clean Catch (Midstream)  Final Report (13 Oct 2021 11:28):    <10,000 CFU/mL Normal Urogenital Bri      COVID-19 PCR: NotDetec (10-11-21 @ 16:05)    RADIOLOGY & ADDITIONAL TESTS:    Care Discussed with Consultants/Other Providers:    Patient is a 89y old  Female who presents with a chief complaint of Intractable nausea and vomiting (12 Oct 2021 15:01)      24 HOUR EVENTS:  No overnight events reported.     SUBJECTIVE:  Patient seen and examined at bedside.   No acute complaints. denies having nausea or vomiting.    ALLERGIES:  cefuroxime (Dystonic RXN)  hydrochlorothiazide (Anaphylaxis; Angioedema)  levofloxacin (Flushing)  sulfamethoxazole (Unknown)    MEDICATIONS  (STANDING):  aspirin enteric coated 81 milliGRAM(s) Oral daily  atorvastatin 40 milliGRAM(s) Oral at bedtime  levothyroxine 50 MICROGram(s) Oral daily  metoprolol tartrate 25 milliGRAM(s) Oral two times a day  pantoprazole    Tablet 40 milliGRAM(s) Oral before breakfast  sodium chloride 0.45%. 1000 milliLiter(s) (50 mL/Hr) IV Continuous <Continuous>  tamsulosin 0.4 milliGRAM(s) Oral at bedtime    MEDICATIONS  (PRN):  acetaminophen   Tablet .. 650 milliGRAM(s) Oral every 6 hours PRN Temp greater or equal to 38C (100.4F), Mild Pain (1 - 3)  aluminum hydroxide/magnesium hydroxide/simethicone Suspension 30 milliLiter(s) Oral every 4 hours PRN Dyspepsia  melatonin 3 milliGRAM(s) Oral at bedtime PRN Insomnia  ondansetron Injectable 4 milliGRAM(s) IV Push every 8 hours PRN Nausea and/or Vomiting    Vital Signs Last 24 Hrs  T(F): 97.6 (13 Oct 2021 04:58), Max: 98.6 (12 Oct 2021 19:45)  HR: 94 (13 Oct 2021 04:58) (67 - 94)  BP: 111/63 (13 Oct 2021 04:58) (111/63 - 124/73)  RR: 16 (13 Oct 2021 04:58) (15 - 18)  SpO2: 96% (13 Oct 2021 04:58) (96% - 98%)  I&O's Summary    12 Oct 2021 07:01  -  13 Oct 2021 07:00  --------------------------------------------------------  IN: 620 mL / OUT: 0 mL / NET: 620 mL      PHYSICAL EXAM:  General: NAD, A/O to self, intermittently forgetful.   ENT: Moist mucous membranes, no thrush  Neck: Supple, No JVD  Lungs: Clear to auscultation bilaterally, good air entry, non-labored breathing  Cardio: RRR, S1/S2, No murmur  Abdomen: Soft, Nontender, Nondistended; Bowel sounds present  Extremities: No calf tenderness, No pitting edema    LABS:                        10.3   8.08  )-----------( 182      ( 13 Oct 2021 06:20 )             30.9     10-13    142  |  108  |  16  ----------------------------<  91  4.1   |  27  |  0.94    Ca    8.4      13 Oct 2021 06:20    TPro  5.8  /  Alb  2.6  /  TBili  0.7  /  DBili  x   /  AST  35  /  ALT  18  /  AlkPhos  101  10-12      Lipase, Serum: 131 U/L (10-11-21 @ 13:30)    eGFR if Non African American: 52 mL/min/1.73M2 (10-13-21 @ 06:20)  eGFR if African American: 61 mL/min/1.73M2 (10-13-21 @ 06:20)              10-12 Chol 140 mg/dL LDL -- HDL 49 mg/dL Trig 58 mg/dL                      Culture - Urine (collected 11 Oct 2021 12:40)  Source: Clean Catch Clean Catch (Midstream)  Final Report (13 Oct 2021 11:28):    <10,000 CFU/mL Normal Urogenital Bri      COVID-19 PCR: NotDetec (10-11-21 @ 16:05)    RADIOLOGY & ADDITIONAL TESTS:    Care Discussed with Consultants/Other Providers:

## 2021-10-13 NOTE — PROGRESS NOTE ADULT - ASSESSMENT
89F with pmh of HTN, hiatal hernia (following Danilo), HLD, hypothyroidism presenting with NBNB nausea and vomiting. Admitted for intractable nausea and vomiting possibly secondary to hiatal hernia.     Intractable Nausea and vomiting - improving.  Leukocytosis - likely reactive, resolved   - afebrile with no source for infection noted on admission  - CXR: neg for acute pathology, personally reviewed  - CT a/p: Colonic diverticulosis without CT evidence of acute diverticulitis. Large hiatal hernia with partially intrathoracic stomach. Mild peripancreatic inflammation, question pancreatitis.  - Lipase negative  - Dr. Carrasco consulted - advance pt's diet as tolerated.   - Consulted GI, spoke with Ashlee JENKINS. Endoscopy likely tomorrow. Start PPI.   - IVF  - clear liquid diet, will advance as tolerated. likely to solids tomorrow.     MYNOR on CKD stage III  - cont  IVF until has adequate PO intake @50cc/hr  - encourage PO intake  - monitor renal function    HTN  - amlodipine 5 daily  - lopressor 25 BID    HLD  - lipitor 40 qhs    Hypothyroidism  - Levothyroxine 50 mcg daily    Urinary retention  - flomax 0.4mg qhs    DVT ppx  - HSQ  - SCDs    Contact: updated the patient's daughter at the bedside.      89F with pmh of HTN, hiatal hernia (following Danilo), HLD, hypothyroidism presenting with NBNB nausea and vomiting. Admitted for intractable nausea and vomiting possibly secondary to hiatal hernia.     Intractable Nausea and vomiting - improving.  Leukocytosis - likely reactive, resolved   - afebrile with no source for infection noted on admission  - CXR: neg for acute pathology, personally reviewed  - CT a/p: Colonic diverticulosis without CT evidence of acute diverticulitis. Large hiatal hernia with partially intrathoracic stomach. Mild peripancreatic inflammation, question pancreatitis.  - Lipase negative  - Dr. Carrasco consulted - advance pt's diet as tolerated. Clear liquid diet for now. NPO for endoscopy.  - Consulted GI, spoke with Ashlee JENKINS. Endoscopy today. Will follow up findings.  - Start PPI.   - IVF    MYNOR on CKD stage III  - cont  IVF until has adequate PO intake   - encourage PO intake  - monitor renal function    HTN  - amlodipine 5 daily  - lopressor 25 BID    HLD  - lipitor 40 qhs    Hypothyroidism  - Levothyroxine 50 mcg daily    Urinary retention  - flomax 0.4mg qhs    DVT ppx  - HSQ  - SCDs    Contact: updated the patient's son over the phone.

## 2021-10-13 NOTE — CHART NOTE - NSCHARTNOTEFT_GEN_A_CORE
89 y/o female admitted for intractable nausea and vomiting. EGD today canceled due to new onset afib noted in endo unit. Discussed with medicine. Patient to be transferred to telemetry. Daughter Michaelle made aware.

## 2021-10-14 LAB
ANION GAP SERPL CALC-SCNC: 10 MMOL/L — SIGNIFICANT CHANGE UP (ref 5–17)
BUN SERPL-MCNC: 12 MG/DL — SIGNIFICANT CHANGE UP (ref 7–23)
CALCIUM SERPL-MCNC: 8.4 MG/DL — SIGNIFICANT CHANGE UP (ref 8.4–10.5)
CHLORIDE SERPL-SCNC: 106 MMOL/L — SIGNIFICANT CHANGE UP (ref 96–108)
CO2 SERPL-SCNC: 25 MMOL/L — SIGNIFICANT CHANGE UP (ref 22–31)
CREAT SERPL-MCNC: 0.89 MG/DL — SIGNIFICANT CHANGE UP (ref 0.5–1.3)
D DIMER BLD IA.RAPID-MCNC: 403 NG/ML DDU — HIGH
GLUCOSE SERPL-MCNC: 86 MG/DL — SIGNIFICANT CHANGE UP (ref 70–99)
HCT VFR BLD CALC: 31.8 % — LOW (ref 34.5–45)
HGB BLD-MCNC: 10.8 G/DL — LOW (ref 11.5–15.5)
MAGNESIUM SERPL-MCNC: 1.5 MG/DL — LOW (ref 1.6–2.6)
MCHC RBC-ENTMCNC: 30.3 PG — SIGNIFICANT CHANGE UP (ref 27–34)
MCHC RBC-ENTMCNC: 34 GM/DL — SIGNIFICANT CHANGE UP (ref 32–36)
MCV RBC AUTO: 89.1 FL — SIGNIFICANT CHANGE UP (ref 80–100)
NRBC # BLD: 0 /100 WBCS — SIGNIFICANT CHANGE UP (ref 0–0)
PHOSPHATE SERPL-MCNC: 3.2 MG/DL — SIGNIFICANT CHANGE UP (ref 2.5–4.5)
PLATELET # BLD AUTO: 210 K/UL — SIGNIFICANT CHANGE UP (ref 150–400)
POTASSIUM SERPL-MCNC: 3.9 MMOL/L — SIGNIFICANT CHANGE UP (ref 3.5–5.3)
POTASSIUM SERPL-SCNC: 3.9 MMOL/L — SIGNIFICANT CHANGE UP (ref 3.5–5.3)
RBC # BLD: 3.57 M/UL — LOW (ref 3.8–5.2)
RBC # FLD: 14.2 % — SIGNIFICANT CHANGE UP (ref 10.3–14.5)
SODIUM SERPL-SCNC: 141 MMOL/L — SIGNIFICANT CHANGE UP (ref 135–145)
TROPONIN I SERPL-MCNC: 0.02 NG/ML — SIGNIFICANT CHANGE UP (ref 0.02–0.06)
WBC # BLD: 6.9 K/UL — SIGNIFICANT CHANGE UP (ref 3.8–10.5)
WBC # FLD AUTO: 6.9 K/UL — SIGNIFICANT CHANGE UP (ref 3.8–10.5)

## 2021-10-14 PROCEDURE — 71275 CT ANGIOGRAPHY CHEST: CPT | Mod: 26

## 2021-10-14 PROCEDURE — 99222 1ST HOSP IP/OBS MODERATE 55: CPT

## 2021-10-14 PROCEDURE — 93306 TTE W/DOPPLER COMPLETE: CPT | Mod: 26

## 2021-10-14 PROCEDURE — 99233 SBSQ HOSP IP/OBS HIGH 50: CPT

## 2021-10-14 RX ORDER — MAGNESIUM SULFATE 500 MG/ML
1 VIAL (ML) INJECTION ONCE
Refills: 0 | Status: COMPLETED | OUTPATIENT
Start: 2021-10-14 | End: 2021-10-14

## 2021-10-14 RX ADMIN — Medication 25 MILLIGRAM(S): at 05:05

## 2021-10-14 RX ADMIN — SODIUM CHLORIDE 50 MILLILITER(S): 9 INJECTION, SOLUTION INTRAVENOUS at 00:45

## 2021-10-14 RX ADMIN — Medication 50 MICROGRAM(S): at 05:05

## 2021-10-14 RX ADMIN — PANTOPRAZOLE SODIUM 40 MILLIGRAM(S): 20 TABLET, DELAYED RELEASE ORAL at 05:04

## 2021-10-14 RX ADMIN — TAMSULOSIN HYDROCHLORIDE 0.4 MILLIGRAM(S): 0.4 CAPSULE ORAL at 21:27

## 2021-10-14 RX ADMIN — Medication 100 GRAM(S): at 11:15

## 2021-10-14 RX ADMIN — Medication 25 MILLIGRAM(S): at 00:45

## 2021-10-14 RX ADMIN — ATORVASTATIN CALCIUM 40 MILLIGRAM(S): 80 TABLET, FILM COATED ORAL at 21:27

## 2021-10-14 RX ADMIN — Medication 25 MILLIGRAM(S): at 17:33

## 2021-10-14 RX ADMIN — Medication 81 MILLIGRAM(S): at 11:19

## 2021-10-14 RX ADMIN — Medication 25 MILLIGRAM(S): at 11:19

## 2021-10-14 NOTE — PROGRESS NOTE ADULT - ASSESSMENT
89F with PMH of HTN, hiatal hernia (following Danilo), HLD, hypothyroidism presenting with NBNB nausea and vomiting. Admitted for intractable nausea and vomiting possibly secondary to hiatal hernia.     #Intractable Nausea and vomiting - improving.  #Leukocytosis - likely reactive, resolved   - Afebrile with no source for infection noted on admission  - CXR: neg for acute pathology, personally reviewed  - CT a/p: Colonic diverticulosis without CT evidence of acute diverticulitis. Large hiatal hernia with partially intrathoracic stomach. Mild peripancreatic inflammation, question pancreatitis.  - Lipase negative  - Dr. Carrasco consulted - advance pt's diet as tolerated  - Patient had been on clear liquid diet for endoscopy - endoscopy cancelled due to new onset a fib   - ??? Outpatient endoscopy? Will f/u with GI  - Continue PPI and IVF for now    #New onset A Fib  - Noted in endoscopy unit so procedure was cancelled  - Unclear etiology - possibly due to dehydration  - Rate is controlled   - Continue PO metoprolol 25mg q6h for now  - Continue IVF  - f/u TTE  - Check D-Dimer  - CHADSVASc score of 4.8%, 4 points, consider AC, will d/w cardiology   - Cardiology consult pending    #MYNOR on CKD stage III  - cont  IVF until has adequate PO intake   - encourage PO intake  - monitor renal function    #HTN  - Home meds: amlodipine 5 daily, lopressor 25 BID  - Was started on lopressor 25 q6 for new onset rapid a fib  - Holding amlodipine for now     #HLD  - Lipitor 40 qhs    #Hypothyroidism  - Levothyroxine 50 mcg daily    #Urinary retention  - Flomax 0.4mg qhs    #DVT ppx  - HSQ  - SCDs    GOC: DNR    Daughter is Michaelle Lopez [phone]     89F with PMH of HTN, hiatal hernia (following Danilo), HLD, hypothyroidism presenting with NBNB nausea and vomiting. Admitted for intractable nausea and vomiting possibly secondary to hiatal hernia.     #Intractable Nausea and vomiting - improving.  #Leukocytosis - likely reactive, resolved   - Afebrile with no source for infection noted on admission  - CXR: neg for acute pathology, personally reviewed  - CT a/p: Colonic diverticulosis without CT evidence of acute diverticulitis. Large hiatal hernia with partially intrathoracic stomach. Mild peripancreatic inflammation, question pancreatitis.  - Lipase negative  - Dr. Carrasco consulted - advance pt's diet as tolerated  - Patient had been on clear liquid diet for endoscopy - endoscopy cancelled due to new onset a fib   - d/w GI, recommends outpatient endoscopy, continue PPI  - Continue PPI and IVF for now    #New onset A Fib  - Noted in endoscopy unit so procedure was cancelled  - Unclear etiology - possibly due to dehydration  - Now in sinus rhythm   - Continue PO metoprolol 25mg q6h for now  - Continue IVF for now  - f/u TTE  - Check D-Dimer  - CHADSVASc score of 4.8%, 4 points, consider AC, will d/w cardiology   - Cardiology consult pending    #MYNOR on CKD stage III  - cont  IVF until has adequate PO intake   - encourage PO intake  - monitor renal function    #HTN  - Home meds: amlodipine 5 daily, lopressor 25 BID  - Was started on lopressor 25 q6 for new onset rapid a fib  - Holding amlodipine for now     #HLD  - Lipitor 40 qhs    #Hypothyroidism  - Levothyroxine 50 mcg daily    #Urinary retention  - Flomax 0.4mg qhs    #DVT ppx  - HSQ  - SCDs    GOC: DNR    Daughter is Michaelle Lopez [phone]     89F with PMH of HTN, hiatal hernia (following Danilo), HLD, hypothyroidism presenting with NBNB nausea and vomiting. Admitted for intractable nausea and vomiting possibly secondary to hiatal hernia.     #Intractable Nausea and vomiting - improving.  #Leukocytosis - likely reactive, resolved   - Afebrile with no source for infection noted on admission  - CXR: neg for acute pathology, personally reviewed  - CT a/p: Colonic diverticulosis without CT evidence of acute diverticulitis. Large hiatal hernia with partially intrathoracic stomach. Mild peripancreatic inflammation, question pancreatitis.  - Lipase negative  - Dr. Carrasco consulted - advance pt's diet as tolerated  - Patient had been on clear liquid diet for endoscopy - endoscopy cancelled due to new onset a fib   - d/w GI, recommends outpatient endoscopy, continue PPI  - Continue PPI and IVF for now    #New onset A Fib  - Noted in endoscopy unit so procedure was cancelled  - Unclear etiology - possibly due to dehydration  - Now in sinus rhythm   - Continue PO metoprolol 25mg q6h for now  - Continue IVF for now  - f/u TTE  - Check D-Dimer  - CHADSVASc score of 4.8%, 4 points, consider AC, will d/w cardiology   - Cardiology consult pending    #Hypomagnesemia  - Mag = 1.5  - Will supplement   - Repeat in AM    #MYNOR on CKD stage III  - cont  IVF until has adequate PO intake   - encourage PO intake  - monitor renal function    #HTN  - Home meds: amlodipine 5 daily, lopressor 25 BID  - Was started on lopressor 25 q6 for new onset rapid a fib  - Holding amlodipine for now     #HLD  - Lipitor 40 qhs    #Hypothyroidism  - Levothyroxine 50 mcg daily    #Urinary retention  - Flomax 0.4mg qhs    #DVT ppx  - HSQ  - SCDs    GOC: DNR    Daughter is Michaelle Lopez [phone]     89F with PMH of HTN, hiatal hernia (following Danilo), HLD, hypothyroidism presenting with NBNB nausea and vomiting. Admitted for intractable nausea and vomiting possibly secondary to hiatal hernia.     #Intractable Nausea and vomiting - improving.  #Leukocytosis - likely reactive, resolved   - Afebrile with no source for infection noted on admission  - CXR: neg for acute pathology, personally reviewed  - CT a/p: Colonic diverticulosis without CT evidence of acute diverticulitis. Large hiatal hernia with partially intrathoracic stomach. Mild peripancreatic inflammation, question pancreatitis.  - Lipase negative  - Dr. Carrasco consulted - advance pt's diet as tolerated  - Patient had been on clear liquid diet for endoscopy - endoscopy cancelled due to new onset a fib   - d/w GI, recommends outpatient endoscopy, continue PPI  - Continue PPI and IVF for now    #New onset A Fib  - Noted in endoscopy unit so procedure was cancelled  - Unclear etiology - possibly due to dehydration  - Now in sinus rhythm   - Continue PO metoprolol 25mg q6h for now  - Continue IVF for now  - f/u TTE  - Check D-Dimer  - CHADSVASc score of 4.8%, 4 points, consider AC, will d/w cardiology   - Cardiology consult pending    #Hypomagnesemia  - Mag = 1.5  - Will supplement   - Repeat in AM    #MYNOR on CKD stage III  - cont  IVF until has adequate PO intake   - encourage PO intake  - monitor renal function    #HTN  - Home meds: amlodipine 5 daily, lopressor 25 BID  - Was started on lopressor 25 q6 for new onset rapid a fib  - Holding amlodipine for now     #HLD  - Lipitor 40 qhs    #Hypothyroidism  - Levothyroxine 50 mcg daily    #Urinary retention  - Flomax 0.4mg qhs    #DVT ppx  - HSQ  - SCDs    GOC: DNR    Dispo: PT eval pending, anticipate DC home tomorrow once tolerating diet     10/14: Updated daughter Michaelle Lopez [phone]     89F with PMH of HTN, hiatal hernia (following Danilo), HLD, hypothyroidism presenting with NBNB nausea and vomiting. Admitted for intractable nausea and vomiting possibly secondary to hiatal hernia.     #Intractable Nausea and vomiting - improving.  #Leukocytosis - likely reactive, resolved   - Afebrile with no source for infection noted on admission  - CXR: neg for acute pathology, personally reviewed  - CT a/p: Colonic diverticulosis without CT evidence of acute diverticulitis. Large hiatal hernia with partially intrathoracic stomach. Mild peripancreatic inflammation, question pancreatitis.  - Lipase negative  - Dr. Carrasco consulted - advance pt's diet as tolerated  - Patient had been on clear liquid diet for endoscopy - endoscopy cancelled due to new onset a fib   - d/w GI, recommends outpatient endoscopy, continue PPI  - Continue PPI and IVF for now    #New onset A Fib  - Noted in endoscopy unit so procedure was cancelled  - Unclear etiology - possibly due to dehydration  - Now in sinus rhythm   - Continue PO metoprolol 25mg q6h for now  - Continue IVF for now  - f/u TTE  - Check D-Dimer  - CHADSVASc score of 4.8%, 4 points, consider AC, will d/w cardiology   - Cardiology consult pending    #Hypomagnesemia  - Mag = 1.5  - Will supplement   - Repeat in AM    #MYNOR on CKD stage III  - cont  IVF until has adequate PO intake   - encourage PO intake  - monitor renal function    #HTN  - Home meds: amlodipine 5 daily, lopressor 25 BID  - Was started on lopressor 25 q6 for new onset rapid a fib  - Holding amlodipine for now     #HLD  - Lipitor 40 qhs    #Hypothyroidism  - Levothyroxine 50 mcg daily    #Urinary retention  - Flomax 0.4mg qhs    #DVT ppx  - HSQ  - SCDs    GOC: DNR    Dispo: PT eval pending, anticipate DC home tomorrow once tolerating diet     10/14: Updated daughter Michaelle Lopez [phone]

## 2021-10-14 NOTE — PROGRESS NOTE ADULT - SUBJECTIVE AND OBJECTIVE BOX
The patient has been tolerating a diet without any fever nausea or vomiting. She denies any abdominal pain, fever or chills. The gastroscopy was postpone due to the development of AF.     PFSH: All noncontributory    MEDICATIONS REVIEWED:acetaminophen   Tablet .. 650 milliGRAM(s) Oral every 6 hours PRN  aluminum hydroxide/magnesium hydroxide/simethicone Suspension 30 milliLiter(s) Oral every 4 hours PRN  aspirin enteric coated 81 milliGRAM(s) Oral daily  atorvastatin 40 milliGRAM(s) Oral at bedtime  levothyroxine 50 MICROGram(s) Oral daily  melatonin 3 milliGRAM(s) Oral at bedtime PRN  metoprolol tartrate 25 milliGRAM(s) Oral every 6 hours  ondansetron Injectable 4 milliGRAM(s) IV Push every 8 hours PRN  pantoprazole    Tablet 40 milliGRAM(s) Oral before breakfast  tamsulosin 0.4 milliGRAM(s) Oral at bedtime      ALLERGIES:cefuroxime (Dystonic RXN)  hydrochlorothiazide (Anaphylaxis; Angioedema)  levofloxacin (Flushing)  sulfamethoxazole (Unknown)      PHYSICAL EXAMINATION:  RESPIRATORY: Clear to auscultation bilaterally, respirations non-labored.  CARDIAC: RR, normal S1S2, no murmurs.  ABDOMEN: soft, BS present, no masses, no hernias, no peritoneal signs, no KLS, nontender.  VASCULAR: Equal and normal pulses.  MUSCULOSKELETAL: Full ROM, no deformities.      T(C): 36.8 (10-14-21 @ 16:03), Max: 36.9 (10-13-21 @ 23:31)  HR: 70 (10-14-21 @ 16:03) (65 - 77)  BP: 132/78 (10-14-21 @ 16:03) (121/70 - 147/76)  RR: 14 (10-14-21 @ 16:03) (14 - 15)  SpO2: 98% (10-14-21 @ 16:03) (96% - 99%)                          10.8   6.90  )-----------( 210      ( 14 Oct 2021 07:10 )             31.8       10-14    141  |  106  |  12  ----------------------------<  86  3.9   |  25  |  0.89    Ca    8.4      14 Oct 2021 07:10  Phos  3.2     10-14  Mg     1.5     10-14

## 2021-10-14 NOTE — DIETITIAN INITIAL EVALUATION ADULT. - PERTINENT LABORATORY DATA
All on 10/14  Na 141  K 3.9  BUN 12  Creat 0.89  Glucose 86 (10/14); 91 (10/13)  egFR: 56 (low)  HDL 49 (low)  A1c 5.7

## 2021-10-14 NOTE — DIETITIAN INITIAL EVALUATION ADULT. - OTHER INFO
89 y/o F with h/o HTN, hiatal hernia, HLD, hypothyroidism presents with NBNB nausea and vomiting starting in the middle of the night accompanied with LLQ abdominal discomfort.  Pt reports UBW to be 120 lbs; time frame unknown. Pt poor historian regarding significant weight changes.   Pt was on clear liquids diet (10/13), now advanced to regular diet. Pt tolerating current diet with fair PO intake. No N/V/D/C. No difficulty chewing/swallowing. Moderate malnutrition diagnosed.   Skin: WDL per nursing flow sheet  Edema: None per nursing flow sheet  Cognition: Confused and forgetful at times per nursing flow sheet  Last BM: 10/11 per nursing flow sheet and per pt interview    Encourage PO intake. Recommend supplement. Honor food preferences as able.

## 2021-10-14 NOTE — PROGRESS NOTE ADULT - ASSESSMENT
89 y/o female pmh HTN, HLD, Hypothyroid, hiatal hernia, rectal cancer ( 25 years ago per daughter) who is admitted with intractable nausea/vomiting.    10/13/2021- Upper endoscopy canceled due to new onset Afib

## 2021-10-14 NOTE — PROGRESS NOTE ADULT - SUBJECTIVE AND OBJECTIVE BOX
INTERVAL HPI/OVERNIGHT EVENTS:  HPI:    89 y/o female admitted with intractable nausea/vomiting. Patient seen and examined. Offers no complaints at this time.     MEDICATIONS  (STANDING):  aspirin enteric coated 81 milliGRAM(s) Oral daily  atorvastatin 40 milliGRAM(s) Oral at bedtime  levothyroxine 50 MICROGram(s) Oral daily  magnesium sulfate  IVPB 1 Gram(s) IV Intermittent once  metoprolol tartrate 25 milliGRAM(s) Oral every 6 hours  pantoprazole    Tablet 40 milliGRAM(s) Oral before breakfast  sodium chloride 0.45%. 1000 milliLiter(s) (50 mL/Hr) IV Continuous <Continuous>  tamsulosin 0.4 milliGRAM(s) Oral at bedtime    MEDICATIONS  (PRN):  acetaminophen   Tablet .. 650 milliGRAM(s) Oral every 6 hours PRN Temp greater or equal to 38C (100.4F), Mild Pain (1 - 3)  aluminum hydroxide/magnesium hydroxide/simethicone Suspension 30 milliLiter(s) Oral every 4 hours PRN Dyspepsia  melatonin 3 milliGRAM(s) Oral at bedtime PRN Insomnia  ondansetron Injectable 4 milliGRAM(s) IV Push every 8 hours PRN Nausea and/or Vomiting      Allergies    cefuroxime (Dystonic RXN)  hydrochlorothiazide (Anaphylaxis; Angioedema)  levofloxacin (Flushing)  sulfamethoxazole (Unknown)    Intolerances        PAST MEDICAL & SURGICAL HISTORY:  CAD (coronary artery disease)    Anemia    HTN (hypertension)    HLD (hyperlipidemia)    Low back pain    Arthritis    UTI (lower urinary tract infection)    Bronchitis    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    Former smoker, stopped smoking in distant past    S/P angioplasty with stent  RCA with stent     S/P cataract extraction and insertion of intraocular lens    S/P hysterectomy      PHYSICAL EXAM:   Vital Signs:  Vital Signs Last 24 Hrs  T(C): 36.8 (14 Oct 2021 08:22), Max: 36.9 (13 Oct 2021 23:31)  T(F): 98.2 (14 Oct 2021 08:22), Max: 98.4 (13 Oct 2021 23:31)  HR: 74 (14 Oct 2021 08:22) (65 - 95)  BP: 143/81 (14 Oct 2021 08:22) (124/75 - 147/76)  BP(mean): --  RR: 14 (14 Oct 2021 08:22) (14 - 16)  SpO2: 96% (14 Oct 2021 08:22) (96% - 99%)  Daily     Daily Weight in k.3 (14 Oct 2021 05:04)I&O's Summary    13 Oct 2021 07:01  -  14 Oct 2021 07:00  --------------------------------------------------------  IN: 240 mL / OUT: 0 mL / NET: 240 mL        GENERAL:  Appears stated age  HEENT:  NC/AT,  conjunctivae clear and pink  CHEST:  Full & symmetric excursion, no increased effort, breath sounds clear  HEART:  Regular rhythm, S1, S2, no murmur  ABDOMEN:  Soft, non-tender, non-distended, normoactive bowel sounds  EXTEREMITIES:  no edema  SKIN:  No rash/warm/dry  NEURO:  Alert, oriented,      LABS:                        10.8   6.90  )-----------( 210      ( 14 Oct 2021 07:10 )             31.8     10-14    141  |  106  |  12  ----------------------------<  86  3.9   |  25  |  0.89    Ca    8.4      14 Oct 2021 07:10  Phos  3.2     10-14  Mg     1.5     10-14          amylase   lipaseLipase, Serum: 131 U/L (10-11 @ 13:30)    RADIOLOGY & ADDITIONAL TESTS:

## 2021-10-14 NOTE — DIETITIAN INITIAL EVALUATION ADULT. - PERTINENT MEDS FT
Lopressor  Synthroid  Protonix  Aluminium hydroxide/magnesium hydroxide  Lipitor  Melatonin  Zofran injectable  Flomax

## 2021-10-14 NOTE — PROGRESS NOTE ADULT - PROBLEM SELECTOR PLAN 1
Resolved  Advance to regular diet  Continue PPI  Outpatient upper endoscopy   Will sign off, please reconsult as needed

## 2021-10-14 NOTE — PROGRESS NOTE ADULT - ASSESSMENT
IMPRESSION: Locally advanced rectal CA    PLAN: The patient has not agreed to any nonoperative treatments at this time            No indication for any surgical treatment unless the rectal Ca causes a significant obstruction or fecal incontinence

## 2021-10-14 NOTE — PROGRESS NOTE ADULT - SUBJECTIVE AND OBJECTIVE BOX
Patient is a 89y old  Female who presents with a chief complaint of Intractable nausea and vomiting (14 Oct 2021 07:24)    Patient seen and examined at bedside. Patient is resting in bed, no complaints at this time.     ALLERGIES:  cefuroxime (Dystonic RXN)  hydrochlorothiazide (Anaphylaxis; Angioedema)  levofloxacin (Flushing)  sulfamethoxazole (Unknown)    MEDICATIONS  (STANDING):  aspirin enteric coated 81 milliGRAM(s) Oral daily  atorvastatin 40 milliGRAM(s) Oral at bedtime  levothyroxine 50 MICROGram(s) Oral daily  metoprolol tartrate 25 milliGRAM(s) Oral every 6 hours  pantoprazole    Tablet 40 milliGRAM(s) Oral before breakfast  sodium chloride 0.45%. 1000 milliLiter(s) (50 mL/Hr) IV Continuous <Continuous>  tamsulosin 0.4 milliGRAM(s) Oral at bedtime    MEDICATIONS  (PRN):  acetaminophen   Tablet .. 650 milliGRAM(s) Oral every 6 hours PRN Temp greater or equal to 38C (100.4F), Mild Pain (1 - 3)  aluminum hydroxide/magnesium hydroxide/simethicone Suspension 30 milliLiter(s) Oral every 4 hours PRN Dyspepsia  melatonin 3 milliGRAM(s) Oral at bedtime PRN Insomnia  ondansetron Injectable 4 milliGRAM(s) IV Push every 8 hours PRN Nausea and/or Vomiting    Vital Signs Last 24 Hrs  T(F): 98.2 (14 Oct 2021 08:22), Max: 98.4 (13 Oct 2021 23:31)  HR: 74 (14 Oct 2021 08:22) (65 - 95)  BP: 143/81 (14 Oct 2021 08:22) (124/75 - 147/76)  RR: 14 (14 Oct 2021 08:22) (14 - 16)  SpO2: 96% (14 Oct 2021 08:22) (96% - 99%)    I&O's Summary  13 Oct 2021 07:01  -  14 Oct 2021 07:00  --------------------------------------------------------  IN: 240 mL / OUT: 0 mL / NET: 240 mL    PHYSICAL EXAM:  GENERAL: NAD, well-groomed, elderly  HEAD:  Atraumatic, Normocephalic  EYES: EOMI, conjunctiva and sclera clear  ENMT: Moist mucous membranes, no thrush  NECK: Supple, No JVD  CHEST/LUNG: Clear to auscultation bilaterally, good air entry, non-labored breathing  HEART: RRR; S1/S2, No murmur  ABDOMEN: Soft, Nontender, Nondistended; Bowel sounds present  VASCULAR: Normal pulses, Normal capillary refill  EXTREMITIES: No calf tenderness, No cyanosis, No edema  SKIN: Warm, Intact  NERVOUS SYSTEM:  A/O x1-2, No focal deficits    LABS:                        10.8   6.90  )-----------( 210      ( 14 Oct 2021 07:10 )             31.8     10-14    141  |  106  |  12  ----------------------------<  86  3.9   |  25  |  0.89    Ca    8.4      14 Oct 2021 07:10  Phos  3.2     10-14  Mg     1.5     10-14    TPro  5.8  /  Alb  2.6  /  TBili  0.7  /  DBili  x   /  AST  35  /  ALT  18  /  AlkPhos  101  10-12    eGFR if Non African American: 56 mL/min/1.73M2 (10-14-21 @ 07:10)  eGFR if : 65 mL/min/1.73M2 (10-14-21 @ 07:10)    10-12 Chol 140 mg/dL LDL -- HDL 49 mg/dL Trig 58 mg/dL    Culture - Urine (collected 11 Oct 2021 12:40)  Source: Clean Catch Clean Catch (Midstream)  Final Report (13 Oct 2021 11:28):    <10,000 CFU/mL Normal Urogenital Bri    COVID-19 PCR: NotDetec (10-11-21 @ 16:05)    RADIOLOGY & ADDITIONAL TESTS:    Care Discussed with Consultants/Other Providers:    Patient is a 89y old  Female who presents with a chief complaint of Intractable nausea and vomiting (14 Oct 2021 07:24)    Patient seen and examined at bedside. Patient is resting in bed, no complaints at this time.     ALLERGIES:  cefuroxime (Dystonic RXN)  hydrochlorothiazide (Anaphylaxis; Angioedema)  levofloxacin (Flushing)  sulfamethoxazole (Unknown)    MEDICATIONS  (STANDING):  aspirin enteric coated 81 milliGRAM(s) Oral daily  atorvastatin 40 milliGRAM(s) Oral at bedtime  levothyroxine 50 MICROGram(s) Oral daily  metoprolol tartrate 25 milliGRAM(s) Oral every 6 hours  pantoprazole    Tablet 40 milliGRAM(s) Oral before breakfast  sodium chloride 0.45%. 1000 milliLiter(s) (50 mL/Hr) IV Continuous <Continuous>  tamsulosin 0.4 milliGRAM(s) Oral at bedtime    MEDICATIONS  (PRN):  acetaminophen   Tablet .. 650 milliGRAM(s) Oral every 6 hours PRN Temp greater or equal to 38C (100.4F), Mild Pain (1 - 3)  aluminum hydroxide/magnesium hydroxide/simethicone Suspension 30 milliLiter(s) Oral every 4 hours PRN Dyspepsia  melatonin 3 milliGRAM(s) Oral at bedtime PRN Insomnia  ondansetron Injectable 4 milliGRAM(s) IV Push every 8 hours PRN Nausea and/or Vomiting    Vital Signs Last 24 Hrs  T(F): 98.2 (14 Oct 2021 08:22), Max: 98.4 (13 Oct 2021 23:31)  HR: 74 (14 Oct 2021 08:22) (65 - 95)  BP: 143/81 (14 Oct 2021 08:22) (124/75 - 147/76)  RR: 14 (14 Oct 2021 08:22) (14 - 16)  SpO2: 96% (14 Oct 2021 08:22) (96% - 99%)    I&O's Summary  13 Oct 2021 07:01  -  14 Oct 2021 07:00  --------------------------------------------------------  IN: 240 mL / OUT: 0 mL / NET: 240 mL    PHYSICAL EXAM:  GENERAL: NAD, well-groomed, elderly  HEAD:  Atraumatic, Normocephalic  EYES: EOMI, conjunctiva and sclera clear  ENMT: Moist mucous membranes, no thrush  NECK: Supple, No JVD  CHEST/LUNG: Clear to auscultation bilaterally, good air entry, non-labored breathing  HEART: RRR; S1/S2, No murmur  ABDOMEN: Soft, Nontender, Nondistended; Bowel sounds present  VASCULAR: Normal pulses, Normal capillary refill  EXTREMITIES: No calf tenderness, No cyanosis, No edema  SKIN: Warm, Intact  NERVOUS SYSTEM:  A/O x1-2, No focal deficits    LABS:                        10.8   6.90  )-----------( 210      ( 14 Oct 2021 07:10 )             31.8     10-14    141  |  106  |  12  ----------------------------<  86  3.9   |  25  |  0.89    Ca    8.4      14 Oct 2021 07:10  Phos  3.2     10-14  Mg     1.5     10-14    TPro  5.8  /  Alb  2.6  /  TBili  0.7  /  DBili  x   /  AST  35  /  ALT  18  /  AlkPhos  101  10-12    eGFR if Non African American: 56 mL/min/1.73M2 (10-14-21 @ 07:10)  eGFR if : 65 mL/min/1.73M2 (10-14-21 @ 07:10)    10-12 Chol 140 mg/dL LDL -- HDL 49 mg/dL Trig 58 mg/dL    Culture - Urine (collected 11 Oct 2021 12:40)  Source: Clean Catch Clean Catch (Midstream)  Final Report (13 Oct 2021 11:28):    <10,000 CFU/mL Normal Urogenital Bri    COVID-19 PCR: NotDetec (10-11-21 @ 16:05)    RADIOLOGY & ADDITIONAL TESTS:    Care Discussed with Consultants/Other Providers: d/w Dr. Mireles

## 2021-10-14 NOTE — CONSULT NOTE ADULT - SUBJECTIVE AND OBJECTIVE BOX
Chief Complaint:   89 y/o F with h/o HTN, hiatal hernia (following Dr. Carrasco), HLD, hypothyroidism presents with NBNB nausea and vomiting starting in the middle of the night accompanied with LLQ abdominal discomfort and loose stool this am. Denies fever, chills, cough, chest pain, shortness of breath, dysuria, weakness, numbness, tingling. Had admission for similar presentation in august 2021. patient is previously seen by dr Foote 7/26/21 felt to be stabel sp stent      HPI:    PMH:   CAD (coronary artery disease)    Anemia    HTN (hypertension)    HLD (hyperlipidemia)    Low back pain    Arthritis    UTI (lower urinary tract infection)    Bronchitis    Adenocarcinoma of rectum    Former smoker, stopped smoking in distant past      PSH:   S/P angioplasty with stent    S/P cataract extraction and insertion of intraocular lens    S/P hysterectomy      Family History:  FAMILY HISTORY:  Family history of myocardial infarction  Brother    Family history of breast cancer  Mother        Social History:  Smoking:  Alcohol:  Drugs:    Allergies:  cefuroxime (Dystonic RXN)  hydrochlorothiazide (Anaphylaxis; Angioedema)  levofloxacin (Flushing)  sulfamethoxazole (Unknown)      Medications:  acetaminophen   Tablet .. 650 milliGRAM(s) Oral every 6 hours PRN  aluminum hydroxide/magnesium hydroxide/simethicone Suspension 30 milliLiter(s) Oral every 4 hours PRN  aspirin enteric coated 81 milliGRAM(s) Oral daily  atorvastatin 40 milliGRAM(s) Oral at bedtime  levothyroxine 50 MICROGram(s) Oral daily  melatonin 3 milliGRAM(s) Oral at bedtime PRN  metoprolol tartrate 25 milliGRAM(s) Oral every 6 hours  ondansetron Injectable 4 milliGRAM(s) IV Push every 8 hours PRN  pantoprazole    Tablet 40 milliGRAM(s) Oral before breakfast  sodium chloride 0.45%. 1000 milliLiter(s) IV Continuous <Continuous>  tamsulosin 0.4 milliGRAM(s) Oral at bedtime      REVIEW OF SYSTEMS:  CONSTITUTIONAL: No fever, weight loss, or fatigue  EYES: No eye pain, visual disturbances, or discharge  ENMT:  No difficulty hearing, tinnitus, vertigo; No sinus or throat pain  NECK: No pain or stiffness  BREASTS: No pain, masses, or nipple discharge  RESPIRATORY: No cough, wheezing, chills or hemoptysis; No shortness of breath  CARDIOVASCULAR: No chest pain, palpitations, dizziness, or leg swelling  GASTROINTESTINAL: No abdominal or epigastric pain. No nausea, vomiting, or hematemesis; No diarrhea or constipation. No melena or hematochezia.  GENITOURINARY: No dysuria, frequency, hematuria, or incontinence  NEUROLOGICAL: No headaches, memory loss, loss of strength, numbness, or tremors  SKIN: No itching, burning, rashes, or lesions   LYMPH NODES: No enlarged glands  ENDOCRINE: No heat or cold intolerance; No hair loss  MUSCULOSKELETAL: No joint pain or swelling; No muscle, back, or extremity pain  PSYCHIATRIC: No depression, anxiety, mood swings, or difficulty sleeping  HEME/LYMPH: No easy bruising, or bleeding gums  ALLERY AND IMMUNOLOGIC: No hives or eczema    Physical Exam:  T(C): 36.8 (10-14-21 @ 08:22), Max: 36.9 (10-13-21 @ 23:31)  HR: 77 (10-14-21 @ 11:20) (65 - 95)  BP: 121/70 (10-14-21 @ 11:20) (121/70 - 147/76)  RR: 14 (10-14-21 @ 08:22) (14 - 16)  SpO2: 96% (10-14-21 @ 08:22) (96% - 99%)  Wt(kg): --    GENERAL: NAD, well-groomed, well-developed elderly woman appearing stated age  HEAD:  Atraumatic, Normocephalic  EYES: EOMI, conjunctiva and sclera clear  ENT: Moist mucous membranes,  NECK: Supple, No JVD, no bruits  CHEST/LUNG: Clear to percussion bilaterally; No rales, rhonchi, wheezing, or rubs  HEART: Regular rate and rhythm; No murmurs, rubs, or gallops PMI non displaced.  ABDOMEN: Soft, Nontender, Nondistended; Bowel sounds present  EXTREMITIES:  2+ Peripheral Pulses, No clubbing, cyanosis, or edema  SKIN: No rashes or lesions  NERVOUS SYSTEM:  Cranial Nerves II-XII intact     Cardiovascular Diagnostic Testing:  ECG:    < from: 12 Lead ECG (08.27.21 @ 13:27) >  Diagnosis Line Normal sinus rhythm  Nonspecific ST and T wave abnormality  Abnormal ECG  When compared with ECG of 24-AUG-2019 12:51,  premature ventricular complexes are no longer present    Confirmed by HALIMA VITAL, ARIADNA VELAZQUEZ ([id number]) on 8/28/2021 8:50:39 AM    < end of copied text >    ECHO:    < from: TTE Echo Complete w/o Contrast w/ Doppler (10.14.21 @ 07:23) >  Summary:   1. Left ventricular ejection fraction, by visual estimation, is 55 to 60%.   2. Normal global left ventricular systolic function.   3. Severely enlarged left atrium.   4. Severely enlarged right atrium.   5. Normal left ventricular internal cavity size.   6. Spectral Doppler shows pseudonormal pattern of left ventricular myocardial filling (Grade II diastolic dysfunction).   7. Suspect PFO with left to right shunt.   8. Biatrial enlargement consistent with restrictive physiology.   9. Moderate to severe mitral valve regurgitation.  10. Thickening of the anterior mitral valve leaflet.  11. Moderate tricuspid regurgitation.  12. Mild aortic regurgitation.  13. Mild pulmonic valve regurgitation.  14. Estimated pulmonary artery systolic pressure is 55.4 mmHg assuming a right atrial pressure of 10 mmHg, which is consistent with moderate pulmonary hypertension.    [id number] Javier Barton , Electronically signed on 10/14/2021 at 11:23:09 AM      *** Final ***    JAVIER BARTON   This document has been electronically signed. Oct 14 2021  7:23AM    < end of copied text >    Labs:                        10.8   6.90  )-----------( 210      ( 14 Oct 2021 07:10 )             31.8     10-14    141  |  106  |  12  ----------------------------<  86  3.9   |  25  |  0.89    Ca    8.4      14 Oct 2021 07:10  Phos  3.2     10-14  Mg     1.5     10-14    Imaging:    cath 3/27/14 rca .3 .4  .5 moderate rca dz ffr neg .88.    imp  paf by report  nausea and vomiting in endo prompting a rapid afib 100-130's by report now rsr. echo demonstrates restriction. will request long term even recording ro Afib ? ilr. would try to obtain recoding tracing from endo if possible. would evaluate pros and cons of anticoagulation in the elderly woman without documented Afib would defer anticoagulation until Afib actually documented.     borderline d dimer in an elderly patient  questionable significance although harjinder exclude pulmonary embolism.     cad  ho stents  asa and statin    discussed with dr arreola
This 89 year old woman developed uncontrollable vomiting two days ago. The vomiting continued through the night, and she presented to the ED yesterday morning. A CT scan only demonstrated a chronic hiatal hernia. The patient was admitted with the same symptoms six weeks ago when there was a suggestion on a CT scan of gastric outlet obstruction. The symptoms quickly cleared, and the patient was discharged after a several day hospitalization. Presently, the patient is feeling "normal" and denies any further nausea or vomiting.      PAST MEDICAL & SURGICAL HISTORY:  HTN  HLD  Hypothyroid    H/O CA of rectum - chemo/RT - no radical surgery  Angioplasty/stent - 2008  Hysterectomy    PFSH: All noncontributory    MEDICATIONS REVIEWED:acetaminophen   Tablet .. 650 milliGRAM(s) Oral every 6 hours PRN  aluminum hydroxide/magnesium hydroxide/simethicone Suspension 30 milliLiter(s) Oral every 4 hours PRN  amLODIPine   Tablet 5 milliGRAM(s) Oral Once  aspirin enteric coated 81 milliGRAM(s) Oral daily  atorvastatin 40 milliGRAM(s) Oral at bedtime  heparin   Injectable 5000 Unit(s) SubCutaneous every 8 hours  levothyroxine 50 MICROGram(s) Oral daily  melatonin 3 milliGRAM(s) Oral at bedtime PRN  metoprolol tartrate 25 milliGRAM(s) Oral two times a day  ondansetron Injectable 4 milliGRAM(s) IV Push every 8 hours PRN  sodium chloride 0.45%. 1000 milliLiter(s) IV Continuous <Continuous>  tamsulosin 0.4 milliGRAM(s) Oral at bedtime      ALLERGIES:cefuroxime (Dystonic RXN)  hydrochlorothiazide (Anaphylaxis; Angioedema)  levofloxacin (Flushing)  sulfamethoxazole (Unknown)      REVIEW OF SYSTEMS:  Comprehensive Review of Systems negative except as noted in HPI      PHYSICAL EXAMINATION:  RESPIRATORY: Clear to auscultation bilaterally, respirations non-labored.  CARDIAC: RR, normal S1S2, no murmurs.  ABDOMEN: soft, BS present, no masses, no hernias, no peritoneal signs, no KLS, nontender.  VASCULAR: Equal and normal pulses.  MUSCULOSKELETAL: Full ROM, no deformities.      T(C): 36.8 (10-12-21 @ 05:22), Max: 37.2 (10-11-21 @ 18:43)  HR: 74 (10-12-21 @ 05:22) (74 - 115)  BP: 118/60 (10-12-21 @ 05:22) (116/68 - 139/73)  RR: 16 (10-12-21 @ 05:22) (16 - 18)  SpO2: 93% (10-12-21 @ 05:22) (93% - 97%)                          10.2   9.53  )-----------( 176      ( 12 Oct 2021 07:10 )             30.2       10-11    142  |  101  |  20  ----------------------------<  201<H>  3.9   |  29  |  1.36<H>    Ca    9.6      11 Oct 2021 13:30    TPro  7.4  /  Alb  3.5  /  TBili  0.9  /  DBili  x   /  AST  30  /  ALT  22  /  AlkPhos  132<H>  10-11      
INTERVAL HPI/OVERNIGHT EVENTS:  HPI:    89 y/o female pmh HTN, HLD, Hypothyroid, hiatal hernia, rectal cancer ( 25 years ago per daughter) who is admitted with intractable nausea/vomiting. Patient presented in August with similar symptoms. No BRBPR or coffee ground emesis noted. No fever,chills, diarrhea or abdominal pain. No unintentional weight loss. Daughter states patient does not eat as much. Denies early satiety. No dysphagia or pain on swallowing. Denies pain after eating or burning in throat. No reflux.     MEDICATIONS  (STANDING):  amLODIPine   Tablet 5 milliGRAM(s) Oral Once  aspirin enteric coated 81 milliGRAM(s) Oral daily  atorvastatin 40 milliGRAM(s) Oral at bedtime  heparin   Injectable 5000 Unit(s) SubCutaneous every 8 hours  levothyroxine 50 MICROGram(s) Oral daily  metoprolol tartrate 25 milliGRAM(s) Oral two times a day  pantoprazole    Tablet 40 milliGRAM(s) Oral before breakfast  sodium chloride 0.45%. 1000 milliLiter(s) (50 mL/Hr) IV Continuous <Continuous>  tamsulosin 0.4 milliGRAM(s) Oral at bedtime    MEDICATIONS  (PRN):  acetaminophen   Tablet .. 650 milliGRAM(s) Oral every 6 hours PRN Temp greater or equal to 38C (100.4F), Mild Pain (1 - 3)  aluminum hydroxide/magnesium hydroxide/simethicone Suspension 30 milliLiter(s) Oral every 4 hours PRN Dyspepsia  melatonin 3 milliGRAM(s) Oral at bedtime PRN Insomnia  ondansetron Injectable 4 milliGRAM(s) IV Push every 8 hours PRN Nausea and/or Vomiting      Allergies    cefuroxime (Dystonic RXN)  hydrochlorothiazide (Anaphylaxis; Angioedema)  levofloxacin (Flushing)  sulfamethoxazole (Unknown)    Intolerances        PAST MEDICAL & SURGICAL HISTORY:  CAD (coronary artery disease)    Anemia    HTN (hypertension)    HLD (hyperlipidemia)    Low back pain    Arthritis    UTI (lower urinary tract infection)    Bronchitis    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    Former smoker, stopped smoking in distant past    S/P angioplasty with stent  RCA with stent     S/P cataract extraction and insertion of intraocular lens    S/P hysterectomy        REVIEW OF SYSTEMS: negative unless indicated in HPI      PHYSICAL EXAM:   Vital Signs:  Vital Signs Last 24 Hrs  T(C): 36.8 (12 Oct 2021 11:00), Max: 37.2 (11 Oct 2021 18:43)  T(F): 98.2 (12 Oct 2021 11:00), Max: 99 (11 Oct 2021 18:43)  HR: 74 (12 Oct 2021 11:00) (74 - 100)  BP: 102/55 (12 Oct 2021 11:00) (102/55 - 139/73)  BP(mean): --  RR: 15 (12 Oct 2021 11:00) (15 - 18)  SpO2: 98% (12 Oct 2021 11:00) (93% - 98%)  Daily Height in cm: 160.02 (11 Oct 2021 18:43)    Daily Weight in k.7 (11 Oct 2021 18:43)I&O's Summary    12 Oct 2021 07:01  -  12 Oct 2021 15:02  --------------------------------------------------------  IN: 620 mL / OUT: 0 mL / NET: 620 mL        GENERAL:  Appears stated age  HEENT:  NC/AT,  conjunctivae clear and pink  CHEST:  Full & symmetric excursion, no increased effort, breath sounds clear  HEART:  Regular rhythm, S1, S2, no murmur  ABDOMEN:  Soft, non-tender, non-distended, normoactive bowel sounds,   EXTEREMITIES:  no edema  SKIN:  No rash/warm/dry  NEURO:  Alert, oriented      LABS:                        10.2   9.53  )-----------( 176      ( 12 Oct 2021 07:10 )             30.2     10-12    138  |  105  |  26<H>  ----------------------------<  87  3.6   |  24  |  1.10    Ca    8.0<L>      12 Oct 2021 07:10    TPro  5.8<L>  /  Alb  2.6<L>  /  TBili  0.7  /  DBili  x   /  AST  35  /  ALT  18  /  AlkPhos  101  10-12        amylase   lipaseLipase, Serum: 131 U/L (10-11 @ 13:30)    RADIOLOGY & ADDITIONAL TESTS:

## 2021-10-14 NOTE — PHYSICAL THERAPY INITIAL EVALUATION ADULT - ADDITIONAL COMMENTS
pt lives alone in private house, 3 mathew w/rail, no stairs in house. pt ambulates independent in house, straight cane in community. pt has RW, shower bench and grab bars per chart. pts dtr and son live nearby and are involved pts care

## 2021-10-14 NOTE — PHARMACOTHERAPY INTERVENTION NOTE - COMMENTS
Met with patient and reviewed current medications. Pt reports no issues taking medications and had no concerns.

## 2021-10-14 NOTE — DIETITIAN INITIAL EVALUATION ADULT. - ORAL INTAKE PTA/DIET HISTORY
Pt reports suboptimal food intake @home PTA and consumes 3 meals a day. Pt reports she cooks for herself. Diet recall: coffee and toast for breakfast; sandwich for lunch; meat and vegetables for dinner.

## 2021-10-15 ENCOUNTER — NON-APPOINTMENT (OUTPATIENT)
Age: 86
End: 2021-10-15

## 2021-10-15 ENCOUNTER — TRANSCRIPTION ENCOUNTER (OUTPATIENT)
Age: 86
End: 2021-10-15

## 2021-10-15 VITALS
HEART RATE: 78 BPM | DIASTOLIC BLOOD PRESSURE: 69 MMHG | TEMPERATURE: 98 F | RESPIRATION RATE: 14 BRPM | SYSTOLIC BLOOD PRESSURE: 122 MMHG | OXYGEN SATURATION: 98 %

## 2021-10-15 DIAGNOSIS — N18.30 CHRONIC KIDNEY DISEASE, STAGE 3 UNSPECIFIED: ICD-10-CM

## 2021-10-15 DIAGNOSIS — E78.5 HYPERLIPIDEMIA, UNSPECIFIED: ICD-10-CM

## 2021-10-15 DIAGNOSIS — E03.9 HYPOTHYROIDISM, UNSPECIFIED: ICD-10-CM

## 2021-10-15 DIAGNOSIS — I10 ESSENTIAL (PRIMARY) HYPERTENSION: ICD-10-CM

## 2021-10-15 DIAGNOSIS — R33.9 RETENTION OF URINE, UNSPECIFIED: ICD-10-CM

## 2021-10-15 DIAGNOSIS — I48.0 PAROXYSMAL ATRIAL FIBRILLATION: ICD-10-CM

## 2021-10-15 DIAGNOSIS — Z29.9 ENCOUNTER FOR PROPHYLACTIC MEASURES, UNSPECIFIED: ICD-10-CM

## 2021-10-15 DIAGNOSIS — E83.42 HYPOMAGNESEMIA: ICD-10-CM

## 2021-10-15 DIAGNOSIS — N17.9 ACUTE KIDNEY FAILURE, UNSPECIFIED: ICD-10-CM

## 2021-10-15 LAB
ANION GAP SERPL CALC-SCNC: 6 MMOL/L — SIGNIFICANT CHANGE UP (ref 5–17)
BUN SERPL-MCNC: 13 MG/DL — SIGNIFICANT CHANGE UP (ref 7–23)
CALCIUM SERPL-MCNC: 8.7 MG/DL — SIGNIFICANT CHANGE UP (ref 8.4–10.5)
CHLORIDE SERPL-SCNC: 107 MMOL/L — SIGNIFICANT CHANGE UP (ref 96–108)
CO2 SERPL-SCNC: 28 MMOL/L — SIGNIFICANT CHANGE UP (ref 22–31)
CREAT SERPL-MCNC: 1.14 MG/DL — SIGNIFICANT CHANGE UP (ref 0.5–1.3)
GLUCOSE SERPL-MCNC: 97 MG/DL — SIGNIFICANT CHANGE UP (ref 70–99)
HCT VFR BLD CALC: 33.5 % — LOW (ref 34.5–45)
HGB BLD-MCNC: 11.2 G/DL — LOW (ref 11.5–15.5)
MCHC RBC-ENTMCNC: 30.6 PG — SIGNIFICANT CHANGE UP (ref 27–34)
MCHC RBC-ENTMCNC: 33.4 GM/DL — SIGNIFICANT CHANGE UP (ref 32–36)
MCV RBC AUTO: 91.5 FL — SIGNIFICANT CHANGE UP (ref 80–100)
NRBC # BLD: 0 /100 WBCS — SIGNIFICANT CHANGE UP (ref 0–0)
PLATELET # BLD AUTO: 238 K/UL — SIGNIFICANT CHANGE UP (ref 150–400)
POTASSIUM SERPL-MCNC: 4.2 MMOL/L — SIGNIFICANT CHANGE UP (ref 3.5–5.3)
POTASSIUM SERPL-SCNC: 4.2 MMOL/L — SIGNIFICANT CHANGE UP (ref 3.5–5.3)
RBC # BLD: 3.66 M/UL — LOW (ref 3.8–5.2)
RBC # FLD: 14.1 % — SIGNIFICANT CHANGE UP (ref 10.3–14.5)
SODIUM SERPL-SCNC: 141 MMOL/L — SIGNIFICANT CHANGE UP (ref 135–145)
WBC # BLD: 7.57 K/UL — SIGNIFICANT CHANGE UP (ref 3.8–10.5)
WBC # FLD AUTO: 7.57 K/UL — SIGNIFICANT CHANGE UP (ref 3.8–10.5)

## 2021-10-15 PROCEDURE — 99239 HOSP IP/OBS DSCHRG MGMT >30: CPT

## 2021-10-15 PROCEDURE — 99232 SBSQ HOSP IP/OBS MODERATE 35: CPT

## 2021-10-15 PROCEDURE — 99233 SBSQ HOSP IP/OBS HIGH 50: CPT

## 2021-10-15 RX ORDER — PANTOPRAZOLE SODIUM 20 MG/1
1 TABLET, DELAYED RELEASE ORAL
Qty: 30 | Refills: 0
Start: 2021-10-15 | End: 2021-11-13

## 2021-10-15 RX ADMIN — Medication 25 MILLIGRAM(S): at 00:58

## 2021-10-15 RX ADMIN — Medication 25 MILLIGRAM(S): at 11:31

## 2021-10-15 RX ADMIN — Medication 81 MILLIGRAM(S): at 11:31

## 2021-10-15 RX ADMIN — Medication 50 MICROGRAM(S): at 05:33

## 2021-10-15 RX ADMIN — PANTOPRAZOLE SODIUM 40 MILLIGRAM(S): 20 TABLET, DELAYED RELEASE ORAL at 05:33

## 2021-10-15 RX ADMIN — Medication 25 MILLIGRAM(S): at 05:33

## 2021-10-15 NOTE — PROGRESS NOTE ADULT - ATTENDING COMMENTS
A. fib not observed on telemetry. Outpatient endoscopy as per GI. Outpatient f/u with Dr. Carrasco
?A-fib in endoscopy suite   No objective evidence of A-fib on tele. Cardio eval noted. Will r/o PE with CTA chest. Gentle hydration after study

## 2021-10-15 NOTE — DISCHARGE NOTE PROVIDER - NSDCFUSCHEDAPPT_GEN_ALL_CORE_FT
GEOVANNA PULIDO ; 10/21/2021 ; NPP Med  [facility]  GEOVANNA PULIDO ; 01/10/2022 ; NPP Cardio [facility]

## 2021-10-15 NOTE — DISCHARGE NOTE PROVIDER - NSDCMRMEDTOKEN_GEN_ALL_CORE_FT
amLODIPine 5 mg oral tablet: 1 tab(s) orally once a day  atorvastatin 40 mg oral tablet: 1 tab(s) orally once a day (at bedtime)  Ecotrin 81 mg oral delayed release tablet: 1 tab(s) orally once a day  levothyroxine 50 mcg (0.05 mg) oral tablet: 1 tab(s) orally once a day  Metoprolol Tartrate 25 mg oral tablet: 1 tab(s) orally 2 times a day  mirtazapine 7.5 mg oral tablet: 2 tab(s) orally once a day (at bedtime)  pantoprazole 40 mg oral delayed release tablet: 1 tab(s) orally once a day (before a meal)  tamsulosin 0.4 mg oral capsule: 1 cap(s) orally once a day (at bedtime)

## 2021-10-15 NOTE — PROGRESS NOTE ADULT - REASON FOR ADMISSION
Intractable nausea and vomiting

## 2021-10-15 NOTE — PROGRESS NOTE ADULT - NUTRITIONAL ASSESSMENT
This patient has been assessed with a concern for Malnutrition and has been determined to have a diagnosis/diagnoses of Moderate protein-calorie malnutrition.    This patient is being managed with:   Diet Regular-  Supplement Feeding Modality:  Oral  Ensure Enlive Cans or Servings Per Day:  1       Frequency:  Daily  Entered: Oct 14 2021  1:57PM    Diet Regular-  Entered: Oct 14 2021  8:54AM    The following pending diet order is being considered for treatment of Moderate protein-calorie malnutrition:null This patient has been assessed with a concern for Malnutrition and has been determined to have a diagnosis/diagnoses of Moderate protein-calorie malnutrition.    This patient is being managed with:   Diet Regular-  Supplement Feeding Modality:  Oral  Ensure Enlive Cans or Servings Per Day:  1       Frequency:  Daily  Entered: Oct 14 2021  1:57PM    Diet Regular-  Entered: Oct 14 2021  8:54AM    The following pending diet order is being considered for treatment of Moderate protein-calorie malnutrition

## 2021-10-15 NOTE — PROGRESS NOTE ADULT - TIME BILLING
Discussion regarding all options and risks; discussed with Medicine; all lab values and imaging studies reviewed;
Long discussion regarding all options and risks; discussed with Medicine; all lab values and imaging studies reviewed

## 2021-10-15 NOTE — PROGRESS NOTE ADULT - ASSESSMENT
IMPRESSION: Gastric atony                      Hiatal hernia    PLAN: Probable discharge today           Gastroscopy as an outpatient           No evidence of any surgical problems

## 2021-10-15 NOTE — PROGRESS NOTE ADULT - SUBJECTIVE AND OBJECTIVE BOX
Follow up for ? afib  SUBJ:    comfortable    PMH  CAD (coronary artery disease)    Anemia    HTN (hypertension)    HLD (hyperlipidemia)    Low back pain    Arthritis    UTI (lower urinary tract infection)    Bronchitis    Adenocarcinoma of rectum    Former smoker, stopped smoking in distant past          MEDICATIONS  (STANDING):  aspirin enteric coated 81 milliGRAM(s) Oral daily  atorvastatin 40 milliGRAM(s) Oral at bedtime  levothyroxine 50 MICROGram(s) Oral daily  metoprolol tartrate 25 milliGRAM(s) Oral every 6 hours  pantoprazole    Tablet 40 milliGRAM(s) Oral before breakfast  tamsulosin 0.4 milliGRAM(s) Oral at bedtime    MEDICATIONS  (PRN):  acetaminophen   Tablet .. 650 milliGRAM(s) Oral every 6 hours PRN Temp greater or equal to 38C (100.4F), Mild Pain (1 - 3)  aluminum hydroxide/magnesium hydroxide/simethicone Suspension 30 milliLiter(s) Oral every 4 hours PRN Dyspepsia  melatonin 3 milliGRAM(s) Oral at bedtime PRN Insomnia  ondansetron Injectable 4 milliGRAM(s) IV Push every 8 hours PRN Nausea and/or Vomiting        PHYSICAL EXAM:  Vital Signs Last 24 Hrs  T(C): 36.8 (15 Oct 2021 12:22), Max: 37.1 (15 Oct 2021 00:25)  T(F): 98.3 (15 Oct 2021 12:22), Max: 98.8 (15 Oct 2021 00:25)  HR: 78 (15 Oct 2021 12:22) (71 - 87)  BP: 122/69 (15 Oct 2021 12:22) (105/58 - 141/80)  BP(mean): --  RR: 14 (15 Oct 2021 12:22) (14 - 18)  SpO2: 98% (15 Oct 2021 12:22) (94% - 98%)    GENERAL: NAD, well-groomed, well-developed  HEAD:  Atraumatic, Normocephalic  EYES: EOMI, PERRLA, conjunctiva and sclera clear  ENT: Moist mucous membranes,  NECK: Supple, No JVD, no bruits  CHEST/LUNG: Clear to percussion bilaterally; No rales, rhonchi, wheezing, or rubs  HEART: Regular rate and rhythm; No murmurs, rubs, or gallops PMI non displaced.  ABDOMEN: Soft, Nontender, Nondistended; Bowel sounds present  EXTREMITIES:  2+ Peripheral Pulses, No clubbing, cyanosis, or edema  SKIN: No rashes or lesions  NERVOUS SYSTEM:  Cranial Nerves II-XII intact      TELEMETRY:  rsr  ECG:      ECHO:    < from: TTE Echo Complete w/o Contrast w/ Doppler (10.14.21 @ 07:23) >    Summary:   1. Left ventricular ejection fraction, by visual estimation, is 55 to 60%.   2. Normal global left ventricular systolic function.   3. Severely enlarged left atrium.   4. Severely enlarged right atrium.   5. Normal left ventricular internal cavity size.   6. Spectral Doppler shows pseudonormal pattern of left ventricular myocardial filling (Grade II diastolic dysfunction).   7. Suspect PFO with left to right shunt.   8. Biatrial enlargement consistent with restrictive physiology.   9. Moderate to severe mitral valve regurgitation.  10. Thickening of the anterior mitral valve leaflet.  11. Moderate tricuspid regurgitation.  12. Mild aortic regurgitation.  13. Mild pulmonic valve regurgitation.  14. Estimated pulmonary artery systolic pressure is 55.4 mmHg assuming a right atrial pressure of 10 mmHg, which is consistent with moderate pulmonary hypertension.    [id number] Jose L Barton , Electronically signed on 10/14/2021 at 11:23:09 AM            *** Final ***                JOSE L BARTON   This document has been electronically signed. Oct 14 2021  7:23AM    < end of copied text >      LABS:                        11.2   7.57  )-----------( 238      ( 15 Oct 2021 07:15 )             33.5     10-15    141  |  107  |  13  ----------------------------<  97  4.2   |  28  |  1.14    Ca    8.7      15 Oct 2021 07:15  Phos  3.2     10-14  Mg     1.5     10-14      CARDIAC MARKERS ( 14 Oct 2021 07:10 )  .020 ng/mL / x     / x     / x     / x        I&O's Summary    14 Oct 2021 07:01  -  15 Oct 2021 07:00  --------------------------------------------------------  IN: 540 mL / OUT: 0 mL / NET: 540 mL    BNP    RADIOLOGY & ADDITIONAL STUDIES:    < from: Xray Chest 1 View AP/PA (10.11.21 @ 13:48) >  IMPRESSION: No evidence for focal infiltrate or lobar consolidation. Hiatal hernia.    --- End of Report ---        TAIWO CHRISTINE MD; Attending Radiologist  This document has been electronically signed. Oct 11 2021  2:01PM    < end of copied text >    < from: CT Angio Chest PE Protocol w/ IV Cont (10.14.21 @ 14:42) >    LUNGS ANDAIRWAYS: Patent central airways.  Subpleural reticulation and scattered patchy groundglass opacities without honeycombing indicating nonspecific fibrotic lung disease. Subsegmental compression atelectasis left lower lobe related to a large hiatal hernia.  PLEURA: Trace left pleural effusion.  MEDIASTINUM AND NAVEEN: No lymphadenopathy.  VESSELS: No evidence of pulmonary emboli. Coronary artery calcification.  HEART: Heart size is normal. No pericardial effusion.  CHEST WALL AND LOWER NECK: Within normal limits.  VISUALIZED UPPER ABDOMEN: Large hiatal hernia containing the entire stomach.  BONES: Degenerative changes.    IMPRESSION:  No evidence of pulmonary embolism.    Large hiatal hernia.        --- End of Report ---    < end of copied text >        ECHO:    impression  ? afib suggest monitoring as outpatient wit long term event recording holter or even ilr discussed with medicine team. consideration to anticoagulation based upon results.

## 2021-10-15 NOTE — PROGRESS NOTE ADULT - ASSESSMENT
89F with PMH of HTN, hiatal hernia (following Danilo), HLD, hypothyroidism presenting with NBNB nausea and vomiting. Admitted for intractable nausea and vomiting.     #Intractable Nausea and vomiting; resolved  - pt tolerating diet  - no source for infection noted on admission, no wbc, no fevers  - CXR: neg for acute pathology  - CT a/p: Colonic diverticulosis without CT evidence of acute diverticulitis. Large hiatal hernia with partially intrathoracic stomach. Mild peripancreatic inflammation, question pancreatitis.  - Lipase negative  - Dr. Carrasco consulted - advance pt's diet as tolerated  - Endoscopy cancelled due to possible new onset a fib   - per GI, recommends outpatient endoscopy, continue PPI    #possible paroxysmal Atrial Fib, now in Sinus Rhythm  - Noted in endoscopy unit so procedure was cancelled  - overnight tele: SR 63>78; occ PAC/PVC  - CTA neg for PE  - Cardiology consult noted; no AC recommended -- cont ASA and Statin, BB    #Hypomagnesemia  - Mag supplemented, cont to follow bmp as outpatient    #MYNOR on CKD stage III  - s/p IVF and encourage PO intake  - monitor renal function    #HTN  -Home meds: amlodipine 5 daily, lopressor 25 BID  -BP in good range    #HLD  - chronic condition cont home Lipitor 40 qhs    #Hypothyroidism  - chronic condition ; cont Levothyroxine 50 mcg daily    #Urinary retention  - Flomax 0.4mg qhs    #DVT ppx  - HSQ  - SCDs    GOC: DNR    Dispo:  Updated daughter Michaelle Lopez [phone] on D/C plan for today. 89F with PMH of HTN, hiatal hernia (following Danilo), HLD, hypothyroidism presenting with NBNB nausea and vomiting. Admitted for intractable nausea and vomiting.     #Intractable Nausea and vomiting; resolved  - pt tolerating diet  - no source for infection noted on admission, no wbc, no fevers  - CXR: neg for acute pathology  - CT a/p: Colonic diverticulosis without CT evidence of acute diverticulitis. Large hiatal hernia with partially intrathoracic stomach. Mild peripancreatic inflammation, question pancreatitis.  - Lipase negative  - Dr. Carrasco consulted - advance pt's diet as tolerated  - Endoscopy cancelled due to possible new onset a fib   - per GI, recommends outpatient endoscopy, continue PPI    #possible paroxysmal Atrial Fib, now in Sinus Rhythm  - Noted in endoscopy unit so procedure was cancelled  - overnight tele: SR 63>78; occ PAC/PVC  - CTA neg for PE  - Cardiology consult noted; no AC recommended -- cont ASA and Statin, BB    #Hypomagnesemia  - Mag supplemented, cont to follow bmp as outpatient    #MYNOR on CKD stage III  - s/p IVF and encourage PO intake  - monitor renal function    #HTN  -Home meds: amlodipine 5 daily, lopressor 25 BID  -BP in good range    #HLD  - chronic condition cont home Lipitor 40 qhs    #Hypothyroidism  - chronic condition ; cont Levothyroxine 50 mcg daily    #Urinary retention  - Flomax 0.4mg qhs    #DVT ppx  - HSQ  - SCDs    GOC: DNR    Dispo:  Updated daughter Michaelle Lopez [phone] on D/C plan for today.    Time spent on discharge - 33 mins

## 2021-10-15 NOTE — DISCHARGE NOTE PROVIDER - DETAILS OF MALNUTRITION DIAGNOSIS/DIAGNOSES
This patient has been assessed with a concern for Malnutrition and was treated during this hospitalization for the following Nutrition diagnosis/diagnoses:     -  10/14/2021: Moderate protein-calorie malnutrition

## 2021-10-15 NOTE — PROGRESS NOTE ADULT - SUBJECTIVE AND OBJECTIVE BOX
Patient is a 89y old  Female who presents with a chief complaint of Intractable nausea and vomiting (15 Oct 2021 08:36)    Patient seen and examined at bedside. No overnight events reported.   Pt. states she feels good, no abd pain, no N/V.    ALLERGIES:  cefuroxime (Dystonic RXN)  hydrochlorothiazide (Anaphylaxis; Angioedema)  levofloxacin (Flushing)  sulfamethoxazole (Unknown)    MEDICATIONS  (STANDING):  aspirin enteric coated 81 milliGRAM(s) Oral daily  atorvastatin 40 milliGRAM(s) Oral at bedtime  levothyroxine 50 MICROGram(s) Oral daily  metoprolol tartrate 25 milliGRAM(s) Oral every 6 hours  pantoprazole    Tablet 40 milliGRAM(s) Oral before breakfast  tamsulosin 0.4 milliGRAM(s) Oral at bedtime    MEDICATIONS  (PRN):  acetaminophen   Tablet .. 650 milliGRAM(s) Oral every 6 hours PRN Temp greater or equal to 38C (100.4F), Mild Pain (1 - 3)  aluminum hydroxide/magnesium hydroxide/simethicone Suspension 30 milliLiter(s) Oral every 4 hours PRN Dyspepsia  melatonin 3 milliGRAM(s) Oral at bedtime PRN Insomnia  ondansetron Injectable 4 milliGRAM(s) IV Push every 8 hours PRN Nausea and/or Vomiting    Vital Signs Last 24 Hrs  T(F): 98.4 (15 Oct 2021 08:52), Max: 98.8 (15 Oct 2021 00:25)  HR: 87 (15 Oct 2021 08:52) (70 - 87)  BP: 133/87 (15 Oct 2021 08:52) (121/70 - 141/80)  RR: 17 (15 Oct 2021 08:52) (14 - 18)  SpO2: 98% (15 Oct 2021 08:52) (94% - 98%)  I&O's Summary    14 Oct 2021 07:01  -  15 Oct 2021 07:00  --------------------------------------------------------  IN: 540 mL / OUT: 0 mL / NET: 540 mL      PHYSICAL EXAM:  General: NAD, A/O x 2-3  ENT: No gross hearing impairment, Moist mucous membranes, no thrush  Neck: Supple, No JVD  Lungs: Clear to auscultation bilaterally, good air entry, non-labored breathing  Cardio: RRR, S1/S2, No murmur  Abdomen: Soft, Nontender, Nondistended; Bowel sounds present  Extremities: No calf tenderness, No cyanosis, No pitting edema  Psych: Appropriate mood and affect  Neuro:  alert, nonfocal    LABS:                        11.2   7.57  )-----------( 238      ( 15 Oct 2021 07:15 )             33.5     10-15    141  |  107  |  13  ----------------------------<  97  4.2   |  28  |  1.14    Ca    8.7      15 Oct 2021 07:15  Phos  3.2     10-14  Mg     1.5     10-14        Lipase, Serum: 131 U/L (10-11-21 @ 13:30)    eGFR if Non African American: 42 mL/min/1.73M2 (10-15-21 @ 07:15)          CARDIAC MARKERS ( 14 Oct 2021 07:10 )  .020 ng/mL / x     / x     / x     / x          10-12 Chol 140 mg/dL LDL -- HDL 49 mg/dL Trig 58 mg/dL                      Culture - Urine (collected 11 Oct 2021 12:40)  Source: Clean Catch Clean Catch (Midstream)  Final Report (13 Oct 2021 11:28):    <10,000 CFU/mL Normal Urogenital Bri      COVID-19 PCR: NotDetec (10-11-21 @ 16:05)    RADIOLOGY & ADDITIONAL TESTS:    Care Discussed with Consultants/Other Providers:    Patient is a 89y old  Female who presents with a chief complaint of Intractable nausea and vomiting (15 Oct 2021 08:36)    Patient seen and examined at bedside. No overnight events reported.   Pt. states she feels good, no abd pain, no N/V.    ALLERGIES:  cefuroxime (Dystonic RXN)  hydrochlorothiazide (Anaphylaxis; Angioedema)  levofloxacin (Flushing)  sulfamethoxazole (Unknown)    MEDICATIONS  (STANDING):  aspirin enteric coated 81 milliGRAM(s) Oral daily  atorvastatin 40 milliGRAM(s) Oral at bedtime  levothyroxine 50 MICROGram(s) Oral daily  metoprolol tartrate 25 milliGRAM(s) Oral every 6 hours  pantoprazole    Tablet 40 milliGRAM(s) Oral before breakfast  tamsulosin 0.4 milliGRAM(s) Oral at bedtime    MEDICATIONS  (PRN):  acetaminophen   Tablet .. 650 milliGRAM(s) Oral every 6 hours PRN Temp greater or equal to 38C (100.4F), Mild Pain (1 - 3)  aluminum hydroxide/magnesium hydroxide/simethicone Suspension 30 milliLiter(s) Oral every 4 hours PRN Dyspepsia  melatonin 3 milliGRAM(s) Oral at bedtime PRN Insomnia  ondansetron Injectable 4 milliGRAM(s) IV Push every 8 hours PRN Nausea and/or Vomiting    Vital Signs Last 24 Hrs  T(F): 98.4 (15 Oct 2021 08:52), Max: 98.8 (15 Oct 2021 00:25)  HR: 87 (15 Oct 2021 08:52) (70 - 87)  BP: 133/87 (15 Oct 2021 08:52) (121/70 - 141/80)  RR: 17 (15 Oct 2021 08:52) (14 - 18)  SpO2: 98% (15 Oct 2021 08:52) (94% - 98%)  I&O's Summary    14 Oct 2021 07:01  -  15 Oct 2021 07:00  --------------------------------------------------------  IN: 540 mL / OUT: 0 mL / NET: 540 mL      PHYSICAL EXAM:  General: NAD, A/O x 2-3  ENT: No gross hearing impairment, Moist mucous membranes, no thrush  Neck: Supple, No JVD  Lungs: Clear to auscultation bilaterally, good air entry, non-labored breathing  Cardio: RRR, S1/S2, No murmur  Abdomen: Soft, Nontender, Nondistended; Bowel sounds present  Extremities: No calf tenderness, No cyanosis, No pitting edema  Psych: Appropriate mood and affect  Neuro:  alert, nonfocal    LABS:                        11.2   7.57  )-----------( 238      ( 15 Oct 2021 07:15 )             33.5     10-15    141  |  107  |  13  ----------------------------<  97  4.2   |  28  |  1.14    Ca    8.7      15 Oct 2021 07:15  Phos  3.2     10-14  Mg     1.5     10-14        Lipase, Serum: 131 U/L (10-11-21 @ 13:30)    eGFR if Non African American: 42 mL/min/1.73M2 (10-15-21 @ 07:15)          CARDIAC MARKERS ( 14 Oct 2021 07:10 )  .020 ng/mL / x     / x     / x     / x          10-12 Chol 140 mg/dL LDL -- HDL 49 mg/dL Trig 58 mg/dL      Culture - Urine (collected 11 Oct 2021 12:40)  Source: Clean Catch Clean Catch (Midstream)  Final Report (13 Oct 2021 11:28):    <10,000 CFU/mL Normal Urogenital Bri      COVID-19 PCR: NotDetec (10-11-21 @ 16:05)    Care Discussed with Consultants/Other Providers: Yes

## 2021-10-15 NOTE — PROGRESS NOTE ADULT - SUBJECTIVE AND OBJECTIVE BOX
The patient is feeling much improved, as she is eating and drinking well and denies any abdominal pain. She passed flatus and stool yesterday. Due to development of atrial fibrillation, the gastroscopy was postponed.    PFSH: All noncontributory    MEDICATIONS REVIEWED:acetaminophen   Tablet .. 650 milliGRAM(s) Oral every 6 hours PRN  aluminum hydroxide/magnesium hydroxide/simethicone Suspension 30 milliLiter(s) Oral every 4 hours PRN  aspirin enteric coated 81 milliGRAM(s) Oral daily  atorvastatin 40 milliGRAM(s) Oral at bedtime  levothyroxine 50 MICROGram(s) Oral daily  melatonin 3 milliGRAM(s) Oral at bedtime PRN  metoprolol tartrate 25 milliGRAM(s) Oral every 6 hours  ondansetron Injectable 4 milliGRAM(s) IV Push every 8 hours PRN  pantoprazole    Tablet 40 milliGRAM(s) Oral before breakfast  tamsulosin 0.4 milliGRAM(s) Oral at bedtime      ALLERGIES:cefuroxime (Dystonic RXN)  hydrochlorothiazide (Anaphylaxis; Angioedema)  levofloxacin (Flushing)  sulfamethoxazole (Unknown)      PHYSICAL EXAMINATION:  RESPIRATORY: Clear to auscultation bilaterally, respirations non-labored.  CARDIAC: normal S1S2, no murmurs.  ABDOMEN: soft, BS present, no masses, no hernias, no peritoneal signs, no KLS, nontender.  VASCULAR: Equal and normal pulses.  MUSCULOSKELETAL: Full ROM, no deformities.      T(C): 36.3 (10-15-21 @ 05:00), Max: 37.1 (10-15-21 @ 00:25)  HR: 79 (10-15-21 @ 05:00) (70 - 79)  BP: 141/80 (10-15-21 @ 05:00) (121/70 - 141/80)  RR: 17 (10-15-21 @ 05:00) (14 - 18)  SpO2: 97% (10-15-21 @ 05:00) (94% - 98%)                          11.2   7.57  )-----------( 238      ( 15 Oct 2021 07:15 )             33.5       10-15    141  |  107  |  13  ----------------------------<  97  4.2   |  28  |  1.14    Ca    8.7      15 Oct 2021 07:15  Phos  3.2     10-14  Mg     1.5     10-14

## 2021-10-15 NOTE — DISCHARGE NOTE PROVIDER - CARE PROVIDER_API CALL
Bing Polo)  Family Medicine  [address] [place] [address]  Phone: [phone]  Fax: [phone]  Follow Up Time:     Barron Jordan)  Gastroenterology; Internal Medicine  [address] [facility], [place] [address]  Phone: [phone]  Fax: [phone]  Follow Up Time:

## 2021-10-15 NOTE — DISCHARGE NOTE PROVIDER - NSDCCPCAREPLAN_GEN_ALL_CORE_FT
PRINCIPAL DISCHARGE DIAGNOSIS  Diagnosis: Severe nausea and vomiting  Assessment and Plan of Treatment:   -you were unable to complete Endoscopy due to elevated heart rate while here in the Hospital  -recommendation by Gastroenterology; Dr. Jordan is to follow up with his office to schedule outpatient Endoscopy  -you were started on Pantoprozole, a medication to help reduce the acid in your stomach      SECONDARY DISCHARGE DIAGNOSES  Diagnosis: Hiatal hernia  Assessment and Plan of Treatment:   -you have a chronic hernia, which means a part of your stomach pushes up into the chest cavity which can give you symptoms of abdominal pain and heartburn  -you were evaluated by Surgery, no interventions recommended

## 2021-10-15 NOTE — DISCHARGE NOTE PROVIDER - NSDCFUADDAPPT_GEN_ALL_CORE_FT
We made you a hospital follow-up visit with Dr. Polo on 10/21/2021 at 11:30am, office #[phone].

## 2021-10-15 NOTE — DISCHARGE NOTE PROVIDER - HOSPITAL COURSE
Hospital Course  89F with PMHx of HTN, hiatal hernia HLD, hypothyroidism admitted for intractable nausea and vomiting.   No source for infection noted on admission, no wbc, no fevers.  CXR: neg for acute pathology,  CT a/p: Colonic diverticulosis without CT evidence of acute diverticulitis. Large hiatal hernia with partially intrathoracic stomach, mild peripancreatic inflammation, question pancreatitis.   Lipase negative.   Dr. Carrasco, Surgery consulted- no interventions recommended.  GI was consulted; pt was prepared to go for Endoscopy but it was cancelled due to possible new onset a fib.  Per GI, recommends outpatient endoscopy, continue PPI.  Pt. did convert to SR.  She was seen by Cardiology.   CTA neg for PE.  No AC recommended -- cont ASA and Statin, BB.  Also with hypomagnesemia,  Mag supplemented, cont to follow bmp as outpatient.  Pt with MYNOR on CKD stage III, Creat did improve after IVF.  Pt. was able to tolerate oral diet and was medically stable for D/C home with family, Daughter Michaelle was informed.    Palliative Care / Advanced Care Planning  Code Status:  DNR  Patient/Family agreeable to Hospice/Palliative- NO    Discharging Provider:  KENNY Mariee  Contact Info: Cell [phone] - Please call with any questions or concerns.    Outpatient Provider:  Dr. Polo, notified of discharge           Hospital Course  89F with PMHx of HTN, hiatal hernia HLD, hypothyroidism admitted for intractable nausea and vomiting.   No source for infection noted on admission, no wbc, no fevers.  CXR: neg for acute pathology,  CT a/p: Colonic diverticulosis without CT evidence of acute diverticulitis. Large hiatal hernia with partially intrathoracic stomach, mild peripancreatic inflammation, question pancreatitis.   Lipase negative.   Dr. Carrasco, Surgery consulted- no interventions recommended.  GI was consulted; pt was prepared to go for Endoscopy but it was cancelled due to possible new onset a fib.  Per GI, recommends outpatient endoscopy, continue PPI.  Pt. did convert to SR.  She was seen by Cardiology.   CTA neg for PE.  No AC recommended -- cont ASA and Statin, BB.  Also with hypomagnesemia,  Mag supplemented, cont to follow bmp as outpatient.  Pt with MYNOR on CKD stage III, Creat did improve after IVF.  Pt. was able to tolerate oral diet and was medically stable for D/C home with family, Daughter Michaelle was informed.    Palliative Care / Advanced Care Planning  Code Status:  DNR  Patient/Family agreeable to Hospice/Palliative- NO    Discharging Provider:  KENNY Mariee  Contact Info: Cell [phone] - Please call with any questions or concerns.    Outpatient Provider:  Dr. Polo, notified of discharge,

## 2021-10-19 ENCOUNTER — NON-APPOINTMENT (OUTPATIENT)
Age: 86
End: 2021-10-19

## 2021-10-21 ENCOUNTER — APPOINTMENT (OUTPATIENT)
Dept: FAMILY MEDICINE | Facility: CLINIC | Age: 86
End: 2021-10-21
Payer: MEDICARE

## 2021-10-21 VITALS
RESPIRATION RATE: 16 BRPM | TEMPERATURE: 96.8 F | SYSTOLIC BLOOD PRESSURE: 120 MMHG | HEART RATE: 93 BPM | DIASTOLIC BLOOD PRESSURE: 80 MMHG | OXYGEN SATURATION: 97 %

## 2021-10-21 DIAGNOSIS — Z87.898 PERSONAL HISTORY OF OTHER SPECIFIED CONDITIONS: ICD-10-CM

## 2021-10-21 PROCEDURE — 99213 OFFICE O/P EST LOW 20 MIN: CPT

## 2021-10-22 NOTE — HISTORY OF PRESENT ILLNESS
[FreeTextEntry1] : Hospital discharge f/u [de-identified] : 89 y/o HTN HLD hypothyroidism CAD COPD admited 10/11/2021 through 10/15/2021 for intractable N&V noted to have hyatal hernia not able to get EGD noted to be on new onset A fib treated ASA BB. Pt seen cardio GI in hospital CXR CTA neg.  Pt notes has no pain and doing well back to eating ok no recurrence. Pt also no recent stool changes blood in stool  No weight changes

## 2021-10-22 NOTE — PHYSICAL EXAM
[Normal S1, S2] : normal S1 and S2 [Normal] : no joint swelling and grossly normal strength and tone

## 2021-10-26 PROCEDURE — 96376 TX/PRO/DX INJ SAME DRUG ADON: CPT

## 2021-10-26 PROCEDURE — 80053 COMPREHEN METABOLIC PANEL: CPT

## 2021-10-26 PROCEDURE — 96365 THER/PROPH/DIAG IV INF INIT: CPT

## 2021-10-26 PROCEDURE — 87086 URINE CULTURE/COLONY COUNT: CPT

## 2021-10-26 PROCEDURE — 83690 ASSAY OF LIPASE: CPT

## 2021-10-26 PROCEDURE — 74176 CT ABD & PELVIS W/O CONTRAST: CPT | Mod: MA

## 2021-10-26 PROCEDURE — 84484 ASSAY OF TROPONIN QUANT: CPT

## 2021-10-26 PROCEDURE — 83735 ASSAY OF MAGNESIUM: CPT

## 2021-10-26 PROCEDURE — 85027 COMPLETE CBC AUTOMATED: CPT

## 2021-10-26 PROCEDURE — 96361 HYDRATE IV INFUSION ADD-ON: CPT

## 2021-10-26 PROCEDURE — 84100 ASSAY OF PHOSPHORUS: CPT

## 2021-10-26 PROCEDURE — 85025 COMPLETE CBC W/AUTO DIFF WBC: CPT

## 2021-10-26 PROCEDURE — 36415 COLL VENOUS BLD VENIPUNCTURE: CPT

## 2021-10-26 PROCEDURE — 71045 X-RAY EXAM CHEST 1 VIEW: CPT

## 2021-10-26 PROCEDURE — 87635 SARS-COV-2 COVID-19 AMP PRB: CPT

## 2021-10-26 PROCEDURE — 99285 EMERGENCY DEPT VISIT HI MDM: CPT | Mod: 25

## 2021-10-26 PROCEDURE — 80061 LIPID PANEL: CPT

## 2021-10-26 PROCEDURE — 86769 SARS-COV-2 COVID-19 ANTIBODY: CPT

## 2021-10-26 PROCEDURE — 80048 BASIC METABOLIC PNL TOTAL CA: CPT

## 2021-10-26 PROCEDURE — 93306 TTE W/DOPPLER COMPLETE: CPT

## 2021-10-26 PROCEDURE — 83036 HEMOGLOBIN GLYCOSYLATED A1C: CPT

## 2021-10-26 PROCEDURE — 85379 FIBRIN DEGRADATION QUANT: CPT

## 2021-10-26 PROCEDURE — 96375 TX/PRO/DX INJ NEW DRUG ADDON: CPT

## 2021-10-26 PROCEDURE — 71275 CT ANGIOGRAPHY CHEST: CPT

## 2021-10-26 PROCEDURE — 97162 PT EVAL MOD COMPLEX 30 MIN: CPT

## 2021-10-27 NOTE — ED ADULT NURSE NOTE - NSFALLRSKHRMRISKTYPE_ED_ALL_ED
SUBJECTIVE:      Chief Complaint   Patient presents with    Nasal Congestion    Nausea & Vomiting    Rash    Otalgia     ongoing since monday morning    Other     hit head- bruised       HPI: Pauly Ga is a 12 m.o. female here with mom because of congestion for the past couple days. +NBNB vomiting for the past three nights. Possibly some softer stools the past day. Rash around face. Has been grabbing at her ears and shaking her head. Eating and drinking well during the day, urine output +. No N/abdominal pain/sore throat. No Sick contacts but does go to . Denies increase work of breathing or behavior changes. Of note, patient hit head twice two days ago, one witnessed by mom and the other at . Had swelling on forehead which has since gone down     Pulse 106   Temp 97 °F (36.1 °C) (Temporal)   Resp 30   Wt 27 lb 12.8 oz (12.6 kg)   SpO2 96%     No Known Allergies    No current outpatient medications on file prior to visit. No current facility-administered medications on file prior to visit. No past medical history on file. No family history on file. Review of Systems   Constitutional: Negative. HENT: Positive for congestion and ear pain. Respiratory: Negative. Cardiovascular: Negative. Gastrointestinal: Positive for vomiting. Genitourinary: Negative. Negative for dysuria. OBJECTIVE:         Physical Exam  Vitals and nursing note reviewed. Constitutional:       General: She is active. Comments: Walking around room, happy, playful    HENT:      Head: Normocephalic. Right Ear: Tympanic membrane normal.      Left Ear: Tympanic membrane normal.      Nose: No congestion. Mouth/Throat:      Mouth: Mucous membranes are moist.   Cardiovascular:      Rate and Rhythm: Normal rate and regular rhythm. Pulses: Normal pulses. Pulmonary:      Effort: Pulmonary effort is normal.      Breath sounds: Normal breath sounds.    Abdominal: General: Abdomen is flat. Bowel sounds are normal.      Palpations: Abdomen is soft. Musculoskeletal:      Cervical back: Normal range of motion. Skin:     General: Skin is warm. Capillary Refill: Capillary refill takes less than 2 seconds. Comments: Small bruise noted over R frontal bone with no underlying swelling or step-offs    Neurological:      Mental Status: She is alert. ASSESSMENT:         1. Runny nose    2. Non-intractable vomiting, presence of nausea not specified, unspecified vomiting type    no signs of AOM on exam today, no neuro deficit noted on exam today, low suspicion for head injury at this time, likely viral GI bug    PLAN:     Would continue close observation   If persistence of vomiting with no other symptoms, would need urgent re-evaluation     Caretaker/Patient in agreement with plan     No follow-ups on file. age(85 years old or older)

## 2021-12-17 NOTE — PATIENT PROFILE ADULT - NSPROEDAABILITYLEARNOTHER_GEN_A_NUR
Discharge Summary     Date:12/17/2021        Patient Toni De Paz     Date of Birth:[birth year]     Age:31 y.o. Admit Date:12/10/2021   Admission Condition:fair   Discharged Condition:good  Discharge Date: 12/17/21     Discharge Diagnoses   Principal Problem (Resolved):    Rhabdomyolysis  Active Problems:    Glycogen storage disease type 5 (HCC)    Elevated CK    Chronic myofascial pain    Blood glucose abnormal      Hospital Stay   Narrative of Hospital Course: 28-year-old with type V glycogen-storage disease presenting with generalized muscle pain. She did have a fall prior to admission. She has had multiple similar episodes in the past mostly atraumatic. Elevated CK level above 2000. Admitted for aggressive fluid hydration and control of symptoms. Slow and gradual improvement in generalized muscle aches. CK gradually defervesced at the time of discharge was slightly above 700. She is ambulating in room without difficulty. States her pain had declined to about her normal state of chronic muscle ache. Is felt that this time she is maximized acute inpatient stay and plan is for discharge home. Consultants:   None    Time Spent on Discharge:  25 minutes were spent in patient examination, evaluation, counseling as well as medication reconciliation, prescriptions for required medications, discharge plan and follow up.       Surgeries/Procedures Performed:        Treatments:   IV hydration and analgesia: Dilaudid    Significant Diagnostic Studies:   Recent Labs:  CBC:   Lab Results   Component Value Date    WBC 7.6 12/12/2021    RBC 4.02 12/12/2021    HGB 10.8 12/12/2021    HCT 35.5 12/12/2021    HCT 35.9 03/04/2011    MCV 88.3 12/12/2021    MCH 26.9 12/12/2021    MCHC 30.4 12/12/2021    RDW 14.3 12/12/2021     12/12/2021     03/04/2011     CMP:    Lab Results   Component Value Date    GLUCOSE 114 12/16/2021     12/16/2021     03/04/2011    K 4.0 12/16/2021    K 4.0 12/10/2021    K 4.4 03/04/2011     12/16/2021     03/04/2011    CO2 25 12/16/2021    BUN 10 12/16/2021    CREATININE 0.8 12/16/2021    CREATININE 0.6 03/04/2011    ANIONGAP 9 12/16/2021    ALKPHOS 70 12/12/2021    ALKPHOS 65 03/04/2011    ALT 36 12/12/2021    AST 26 12/12/2021    BILITOT 0.3 12/12/2021    LABALBU 3.8 12/12/2021    LABALBU 4.1 03/04/2011    LABGLOM >60 12/16/2021    GFRAA >59 12/16/2021    PROT 5.9 12/12/2021    PROT 5.8 03/04/2011    CALCIUM 8.7 12/16/2021         Radiology Last 7 Days:  No results found. Discharge Plan   Disposition: Home    Provider Follow-Up:   [facility]ie Pope [facility]  [address] [facility] [place] [address] [place] [id number] [phone]    In 2 weeks         Hospital/Incidental Findings Requiring Follow-Up:  Chronic glycogen-storage disease    Patient Instructions   Diet: regular diet    Activity: activity as tolerated    Other Instructions:   Stay hydrated and keep any follow-up appointments.     Discharge Medications         Medication List      CONTINUE taking these medications    amLODIPine 10 MG tablet  Commonly known as: NORVASC  Take 1 tablet by mouth daily     busPIRone 10 MG tablet  Commonly known as: BUSPAR  Take 1 tablet by mouth 2 times daily     diazePAM 10 MG tablet  Commonly known as: VALIUM     dicyclomine 10 MG capsule  Commonly known as: BENTYL     escitalopram 10 MG tablet  Commonly known as: LEXAPRO  Take 1 tablet by mouth daily     losartan 25 MG tablet  Commonly known as: COZAAR  Take 1 tablet by mouth daily     NORFLEX PO     ondansetron 4 MG disintegrating tablet  Commonly known as: Zofran ODT  Take 1 tablet by mouth every 8 hours as needed for Nausea or Vomiting     oxyCODONE-acetaminophen  MG per tablet  Commonly known as: PERCOCET     * promethazine 25 MG tablet  Commonly known as: PHENERGAN     * promethazine 25 MG suppository  Commonly known as: PHENERGAN     scopolamine transdermal patch  Commonly known as: TRANSDERM-SCOP     sodium bicarbonate 325 MG tablet     SUMAtriptan 25 MG tablet  Commonly known as: IMITREX     tiZANidine 4 MG tablet  Commonly known as: ZANAFLEX     traZODone 50 MG tablet  Commonly known as: DESYREL  Take 1 tablet by mouth nightly     vitamin D3 125 MCG (5000 UT) Caps         * This list has 2 medication(s) that are the same as other medications prescribed for you. Read the directions carefully, and ask your doctor or other care provider to review them with you.             ASK your doctor about these medications    Hyoscyamine Sulfate SL 0.125 MG Subl  Commonly known as: Levsin/SL  Place 0.125 mg under the tongue every 4-6 hours as needed (abdominal cramping/pain)            Electronically signed by Meryle Bott, MD on 12/17/21 at 8:24 AM CST hearing problems

## 2021-12-19 ENCOUNTER — RX RENEWAL (OUTPATIENT)
Age: 86
End: 2021-12-19

## 2022-01-16 ENCOUNTER — RX RENEWAL (OUTPATIENT)
Age: 87
End: 2022-01-16

## 2022-03-07 ENCOUNTER — APPOINTMENT (OUTPATIENT)
Dept: CARDIOLOGY | Facility: CLINIC | Age: 87
End: 2022-03-07
Payer: MEDICARE

## 2022-03-07 ENCOUNTER — NON-APPOINTMENT (OUTPATIENT)
Age: 87
End: 2022-03-07

## 2022-03-07 VITALS
HEART RATE: 83 BPM | RESPIRATION RATE: 16 BRPM | TEMPERATURE: 97.4 F | BODY MASS INDEX: 23.37 KG/M2 | SYSTOLIC BLOOD PRESSURE: 135 MMHG | HEIGHT: 62 IN | DIASTOLIC BLOOD PRESSURE: 79 MMHG | WEIGHT: 127 LBS | OXYGEN SATURATION: 96 %

## 2022-03-07 PROCEDURE — 93000 ELECTROCARDIOGRAM COMPLETE: CPT | Mod: 59

## 2022-03-07 PROCEDURE — 99215 OFFICE O/P EST HI 40 MIN: CPT | Mod: 25

## 2022-03-07 PROCEDURE — 93270 REMOTE 30 DAY ECG REV/REPORT: CPT

## 2022-03-07 NOTE — HISTORY OF PRESENT ILLNESS
[FreeTextEntry1] : Follow-up visit history of ASHD and valvular disease.\par Seen with family member reviewed discharge summary Dr. Polo note and selected hospital notes including consultation note and cardiac studies. Was hospitalized with nausea vomiting found to have large hiatal hernia. Apparently when going for endoscopy had tachycardia which was thought to be A. fib which reverted to sinus but was not ever documented to be A. fib. Consultation by our group recommended outpatient monitoring and no anticoagulation at this time pending above.\par \par She denies palpitations chest pain shortness of breath.

## 2022-03-07 NOTE — DISCUSSION/SUMMARY
[Patient] : the patient [Risks] : risks [Benefits] : benefits [Alternatives] : alternatives [___ Week(s)] : in [unfilled] week(s) [FreeTextEntry1] : Reviewed available hospital records would continue current cardiac medication. Discussed issues with patient. To have event recorder then follow-up. If A. fib noted will consider risks and benefits of oral anticoagulation.

## 2022-03-07 NOTE — ASSESSMENT
[FreeTextEntry1] : Elderly lady with history of CAD History of prior stenting. Large hiatal hernia. Possible situational related A. fib not well documented at time of endoscopy

## 2022-03-07 NOTE — REASON FOR VISIT
[Arrhythmia/ECG Abnorrmalities] : arrhythmia/ECG abnormalities [Hypertension] : hypertension [Coronary Artery Disease] : coronary artery disease [Other: ____] : [unfilled] [Family Member] : family member [FreeTextEntry3] : carol

## 2022-03-14 ENCOUNTER — APPOINTMENT (OUTPATIENT)
Dept: FAMILY MEDICINE | Facility: CLINIC | Age: 87
End: 2022-03-14
Payer: MEDICARE

## 2022-03-14 VITALS
SYSTOLIC BLOOD PRESSURE: 133 MMHG | RESPIRATION RATE: 16 BRPM | TEMPERATURE: 97.3 F | OXYGEN SATURATION: 98 % | HEART RATE: 79 BPM | WEIGHT: 131 LBS | DIASTOLIC BLOOD PRESSURE: 84 MMHG | BODY MASS INDEX: 23.96 KG/M2

## 2022-03-14 DIAGNOSIS — R68.89 OTHER GENERAL SYMPTOMS AND SIGNS: ICD-10-CM

## 2022-03-14 DIAGNOSIS — Z87.19 PERSONAL HISTORY OF OTHER DISEASES OF THE DIGESTIVE SYSTEM: ICD-10-CM

## 2022-03-14 DIAGNOSIS — K31.1 ADULT HYPERTROPHIC PYLORIC STENOSIS: ICD-10-CM

## 2022-03-14 DIAGNOSIS — D64.9 ANEMIA, UNSPECIFIED: ICD-10-CM

## 2022-03-14 PROCEDURE — G0439: CPT

## 2022-03-15 NOTE — PHYSICAL EXAM
[Well Nourished] : well nourished [No Acute Distress] : no acute distress [Well Developed] : well developed [Well-Appearing] : well-appearing [Normal Sclera/Conjunctiva] : normal sclera/conjunctiva [Normal Outer Ear/Nose] : the outer ears and nose were normal in appearance [EOMI] : extraocular movements intact [No JVD] : no jugular venous distention [No Lymphadenopathy] : no lymphadenopathy [Thyroid Normal, No Nodules] : the thyroid was normal and there were no nodules present [Supple] : supple [No Respiratory Distress] : no respiratory distress  [No Accessory Muscle Use] : no accessory muscle use [Clear to Auscultation] : lungs were clear to auscultation bilaterally [Normal Rate] : normal rate  [Regular Rhythm] : with a regular rhythm [Normal S1, S2] : normal S1 and S2 [No Carotid Bruits] : no carotid bruits [No Abdominal Bruit] : a ~M bruit was not heard ~T in the abdomen [No Varicosities] : no varicosities [Pedal Pulses Present] : the pedal pulses are present [No Edema] : there was no peripheral edema [No Palpable Aorta] : no palpable aorta [No Extremity Clubbing/Cyanosis] : no extremity clubbing/cyanosis [Non Tender] : non-tender [Soft] : abdomen soft [Non-distended] : non-distended [No Masses] : no abdominal mass palpated [No HSM] : no HSM [Normal Posterior Cervical Nodes] : no posterior cervical lymphadenopathy [Normal Bowel Sounds] : normal bowel sounds [Normal Anterior Cervical Nodes] : no anterior cervical lymphadenopathy [No CVA Tenderness] : no CVA  tenderness [No Spinal Tenderness] : no spinal tenderness [Grossly Normal Strength/Tone] : grossly normal strength/tone [No Joint Swelling] : no joint swelling [No Rash] : no rash [Coordination Grossly Intact] : coordination grossly intact [No Focal Deficits] : no focal deficits [Deep Tendon Reflexes (DTR)] : deep tendon reflexes were 2+ and symmetric [Normal Affect] : the affect was normal [Normal Insight/Judgement] : insight and judgment were intact [de-identified] : ELIS wears hearing aids [de-identified] : gait instability uses cane to ambulate

## 2022-03-15 NOTE — HEALTH RISK ASSESSMENT
[Good] : ~his/her~  mood as  good [Intercurrent hospitalizations] : was admitted to the hospital  [Former] : Former [No] : No [No falls in past year] : Patient reported no falls in the past year [0] : 2) Feeling down, depressed, or hopeless: Not at all (0) [HIV test declined] : HIV test declined [Hepatitis C test declined] : Hepatitis C test declined [None] : None [Alone] : lives alone [Retired] : retired [High School] : high school [] :  [# Of Children ___] : has [unfilled] children [Fully functional (bathing, dressing, toileting, transferring, walking, feeding)] : Fully functional (bathing, dressing, toileting, transferring, walking, feeding) [Independent] : preparing meals [Some assistance needed] : managing medications [Full assistance needed] : managing finances [de-identified] : PETERSON  [de-identified] : Cardiology  [Change in mental status noted] : No change in mental status noted [Sexually Active] : not sexually active

## 2022-03-15 NOTE — REVIEW OF SYSTEMS
[Headache] : no headache [Dizziness] : no dizziness [Unsteady Walking] : ataxia [Negative] : Heme/Lymph [de-identified] : forgetfull

## 2022-03-15 NOTE — HISTORY OF PRESENT ILLNESS
[Other: _____] : [unfilled] [de-identified] : 89 y/o CAD HTN COPD here for CPE doing well Weenies CP SOB palpitations or dizziness. Went to cardio and now has event recorder on since not on A fib as per daughter here with pt. Pt not bowel or bladder issues feeling fine. No pain. Daughter notes mom still lives alone but she monitors her all the time and does or IADLs for her Daughter traveled for 3 weeks and notes mom did ok on her own only having a friend coming in a couple of times a week to check on her all went well.

## 2022-03-22 ENCOUNTER — LABORATORY RESULT (OUTPATIENT)
Age: 87
End: 2022-03-22

## 2022-03-22 LAB
ALBUMIN SERPL ELPH-MCNC: 3.9 G/DL
ALP BLD-CCNC: 120 U/L
ALT SERPL-CCNC: 18 U/L
ANION GAP SERPL CALC-SCNC: 14 MMOL/L
AST SERPL-CCNC: 31 U/L
BASOPHILS # BLD AUTO: 0.08 K/UL
BASOPHILS NFR BLD AUTO: 0.9 %
BILIRUB SERPL-MCNC: 0.6 MG/DL
BUN SERPL-MCNC: 19 MG/DL
CALCIUM SERPL-MCNC: 9.4 MG/DL
CHLORIDE SERPL-SCNC: 108 MMOL/L
CHOLEST SERPL-MCNC: 191 MG/DL
CO2 SERPL-SCNC: 24 MMOL/L
CREAT SERPL-MCNC: 1.03 MG/DL
EGFR: 51 ML/MIN/1.73M2
EOSINOPHIL # BLD AUTO: 0.33 K/UL
EOSINOPHIL NFR BLD AUTO: 3.8 %
FOLATE SERPL-MCNC: >20 NG/ML
GLUCOSE SERPL-MCNC: 104 MG/DL
HCT VFR BLD CALC: 37.1 %
HDLC SERPL-MCNC: 64 MG/DL
HGB BLD-MCNC: 11.4 G/DL
IMM GRANULOCYTES NFR BLD AUTO: 0.6 %
LDLC SERPL CALC-MCNC: 108 MG/DL
LYMPHOCYTES # BLD AUTO: 2.65 K/UL
LYMPHOCYTES NFR BLD AUTO: 30.9 %
MAN DIFF?: NORMAL
MCHC RBC-ENTMCNC: 30 PG
MCHC RBC-ENTMCNC: 30.7 GM/DL
MCV RBC AUTO: 97.6 FL
MONOCYTES # BLD AUTO: 0.66 K/UL
MONOCYTES NFR BLD AUTO: 7.7 %
NEUTROPHILS # BLD AUTO: 4.81 K/UL
NEUTROPHILS NFR BLD AUTO: 56.1 %
NONHDLC SERPL-MCNC: 127 MG/DL
PLATELET # BLD AUTO: 253 K/UL
POTASSIUM SERPL-SCNC: 4.4 MMOL/L
PROT SERPL-MCNC: 6.5 G/DL
RBC # BLD: 3.8 M/UL
RBC # FLD: 16 %
SODIUM SERPL-SCNC: 145 MMOL/L
TRIGL SERPL-MCNC: 96 MG/DL
TSH SERPL-ACNC: 5.22 UIU/ML
VIT B12 SERPL-MCNC: 471 PG/ML
WBC # FLD AUTO: 8.58 K/UL

## 2022-04-11 ENCOUNTER — APPOINTMENT (OUTPATIENT)
Dept: CARDIOLOGY | Facility: CLINIC | Age: 87
End: 2022-04-11
Payer: MEDICARE

## 2022-04-11 VITALS
BODY MASS INDEX: 23.55 KG/M2 | HEIGHT: 62 IN | DIASTOLIC BLOOD PRESSURE: 66 MMHG | OXYGEN SATURATION: 95 % | WEIGHT: 128 LBS | SYSTOLIC BLOOD PRESSURE: 146 MMHG | TEMPERATURE: 97.7 F | RESPIRATION RATE: 16 BRPM | HEART RATE: 106 BPM

## 2022-04-11 DIAGNOSIS — I47.2 VENTRICULAR TACHYCARDIA: ICD-10-CM

## 2022-04-11 PROCEDURE — 99214 OFFICE O/P EST MOD 30 MIN: CPT

## 2022-04-11 PROCEDURE — 93272 ECG/REVIEW INTERPRET ONLY: CPT

## 2022-04-11 NOTE — HISTORY OF PRESENT ILLNESS
[FreeTextEntry1] : Follow-up visit history of ASHD and valvular disease.\par \par Event recorder had shown SVT nonsustained VT PVCs all asymptomatic.\par \par She denies palpitations chest pain shortness of breath.  She is not active physically.\par \par Family is indicated they do not wish to have endoscopy.  They indicated MOLST form was filled out at last hospital stay.\par \par

## 2022-04-11 NOTE — CARDIOLOGY SUMMARY
[de-identified] : March 7, 2022 sinus rhythm [de-identified] : 2013 mild anterolateral ischemia [de-identified] : 2020 normal LV function mitral and tricuspid regurg\par \par October 2021 biatrial enlargement restrictive physiology moderate to severe mitral regurgitation normal LV systolic function pseudonormal diastolic function [de-identified] : 2014 moderate nonobstructive disease 2008 LAD stent

## 2022-04-11 NOTE — ASSESSMENT
[FreeTextEntry1] : Elderly lady with history of CAD History of prior stenting. Large hiatal hernia. Possible situational related A. fib not well documented at time of endoscopy\par Brief runs of SVT and VT noted on monitoring.

## 2022-04-11 NOTE — DISCUSSION/SUMMARY
[Patient] : the patient [Risks] : risks [Benefits] : benefits [Alternatives] : alternatives [FreeTextEntry1] : Blood work noted.  Will reassess LV function with echo and look for ischemia with nuclear stress and follow-up..  Continue aspirin amlodipine atorvastatin lisinopril metoprolol and Zetia from cardiac viewpoint.  Discussed with patient and daughter.

## 2022-04-14 ENCOUNTER — NON-APPOINTMENT (OUTPATIENT)
Age: 87
End: 2022-04-14

## 2022-04-18 ENCOUNTER — APPOINTMENT (OUTPATIENT)
Dept: CARDIOLOGY | Facility: CLINIC | Age: 87
End: 2022-04-18

## 2022-04-25 ENCOUNTER — APPOINTMENT (OUTPATIENT)
Dept: CARDIOLOGY | Facility: CLINIC | Age: 87
End: 2022-04-25
Payer: MEDICARE

## 2022-04-25 PROCEDURE — A9500: CPT

## 2022-04-25 PROCEDURE — 93015 CV STRESS TEST SUPVJ I&R: CPT

## 2022-04-25 PROCEDURE — 78452 HT MUSCLE IMAGE SPECT MULT: CPT

## 2022-04-27 ENCOUNTER — RX RENEWAL (OUTPATIENT)
Age: 87
End: 2022-04-27

## 2022-05-02 ENCOUNTER — APPOINTMENT (OUTPATIENT)
Dept: CARDIOLOGY | Facility: CLINIC | Age: 87
End: 2022-05-02
Payer: MEDICARE

## 2022-05-02 PROCEDURE — 93306 TTE W/DOPPLER COMPLETE: CPT

## 2022-06-29 ENCOUNTER — RX RENEWAL (OUTPATIENT)
Age: 87
End: 2022-06-29

## 2022-09-30 ENCOUNTER — RX RENEWAL (OUTPATIENT)
Age: 87
End: 2022-09-30

## 2022-11-21 ENCOUNTER — NON-APPOINTMENT (OUTPATIENT)
Age: 87
End: 2022-11-21

## 2022-11-21 ENCOUNTER — APPOINTMENT (OUTPATIENT)
Dept: CARDIOLOGY | Facility: CLINIC | Age: 87
End: 2022-11-21

## 2022-11-21 VITALS
DIASTOLIC BLOOD PRESSURE: 84 MMHG | HEIGHT: 62 IN | OXYGEN SATURATION: 98 % | SYSTOLIC BLOOD PRESSURE: 142 MMHG | RESPIRATION RATE: 16 BRPM | BODY MASS INDEX: 22.45 KG/M2 | HEART RATE: 97 BPM | WEIGHT: 122 LBS

## 2022-11-21 PROCEDURE — 99214 OFFICE O/P EST MOD 30 MIN: CPT

## 2022-11-21 PROCEDURE — 93000 ELECTROCARDIOGRAM COMPLETE: CPT

## 2022-11-21 NOTE — HISTORY OF PRESENT ILLNESS
[FreeTextEntry1] : Follow-up visit history of ASHD and valvular disease.\par \par She denies palpitations chest pain shortness of breath.  She is not active physically.\par \par Family is indicated they do not wish to have endoscopy.  They indicated MOLST form was filled out at last hospital stay.\par \par

## 2022-11-21 NOTE — CARDIOLOGY SUMMARY
[de-identified] : 11/21/22 sinus ivcd [de-identified] : 2013 mild anterolateral ischemia [de-identified] : 2020 normal LV function mitral and tricuspid regurg\par \par October 2021 biatrial enlargement restrictive physiology moderate to severe mitral regurgitation normal LV systolic function pseudonormal diastolic function [de-identified] : 2014 moderate nonobstructive disease 2008 LAD stent

## 2022-11-21 NOTE — DISCUSSION/SUMMARY
[Patient] : the patient [Risks] : risks [Benefits] : benefits [Alternatives] : alternatives [FreeTextEntry1] : Blood work ordered.  Continue aspirin amlodipine atorvastatin lisinopril metoprolol and Zetia from cardiac viewpoint.  Discussed with patient and daughter. Patient advised to avoid cold exposure, no shoveling snow.\par  [EKG obtained to assist in diagnosis and management of assessed problem(s)] : EKG obtained to assist in diagnosis and management of assessed problem(s)

## 2022-11-21 NOTE — ASSESSMENT
[FreeTextEntry1] : Elderly lady with history of CAD History of prior stenting. Large hiatal hernia. Possible situational related A. fib not well documented at time of endoscopy\par \par Brief runs of SVT and VT noted on monitoring.\par \par Currently asymptomatic

## 2022-12-10 ENCOUNTER — NON-APPOINTMENT (OUTPATIENT)
Age: 87
End: 2022-12-10

## 2023-03-17 ENCOUNTER — LABORATORY RESULT (OUTPATIENT)
Age: 88
End: 2023-03-17

## 2023-03-25 ENCOUNTER — RX RENEWAL (OUTPATIENT)
Age: 88
End: 2023-03-25

## 2023-04-18 ENCOUNTER — APPOINTMENT (OUTPATIENT)
Dept: FAMILY MEDICINE | Facility: CLINIC | Age: 88
End: 2023-04-18
Payer: MEDICARE

## 2023-04-18 VITALS
WEIGHT: 123 LBS | SYSTOLIC BLOOD PRESSURE: 121 MMHG | OXYGEN SATURATION: 95 % | TEMPERATURE: 97.6 F | HEIGHT: 62 IN | HEART RATE: 84 BPM | DIASTOLIC BLOOD PRESSURE: 74 MMHG | RESPIRATION RATE: 16 BRPM | BODY MASS INDEX: 22.63 KG/M2

## 2023-04-18 DIAGNOSIS — Z09 ENCOUNTER FOR FOLLOW-UP EXAMINATION AFTER COMPLETED TREATMENT FOR CONDITIONS OTHER THAN MALIGNANT NEOPLASM: ICD-10-CM

## 2023-04-18 DIAGNOSIS — Z00.00 ENCOUNTER FOR GENERAL ADULT MEDICAL EXAMINATION W/OUT ABNORMAL FINDINGS: ICD-10-CM

## 2023-04-18 DIAGNOSIS — I47.1 SUPRAVENTRICULAR TACHYCARDIA: ICD-10-CM

## 2023-04-18 PROCEDURE — G0439: CPT

## 2023-04-20 NOTE — HEALTH RISK ASSESSMENT
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

## 2023-04-20 NOTE — PHYSICAL EXAM
[No Acute Distress] : no acute distress [Well-Appearing] : well-appearing [Normal Sclera/Conjunctiva] : normal sclera/conjunctiva [EOMI] : extraocular movements intact [Normal TMs] : both tympanic membranes were normal [No JVD] : no jugular venous distention [Normal S1, S2] : normal S1 and S2 [Normal] : no joint swelling and grossly normal strength and tone [Coordination Grossly Intact] : coordination grossly intact [Normal Affect] : the affect was normal [Normal Insight/Judgement] : insight and judgment were intact [de-identified] : ELIS [de-identified] : dentures wearing hearing aids  [de-identified] : dry skin  [de-identified] : slow when ambulates

## 2023-05-14 NOTE — DISCHARGE NOTE PROVIDER - NSDCCPCAREPLAN_GEN_ALL_CORE_FT
PRINCIPAL DISCHARGE DIAGNOSIS  Diagnosis: Gastric outlet obstruction  Assessment and Plan of Treatment:       SECONDARY DISCHARGE DIAGNOSES  Diagnosis: Nausea & vomiting  Assessment and Plan of Treatment:      PAST SURGICAL HISTORY:  S/P dilation and curettage missed  2020

## 2023-05-22 ENCOUNTER — NON-APPOINTMENT (OUTPATIENT)
Age: 88
End: 2023-05-22

## 2023-05-22 ENCOUNTER — APPOINTMENT (OUTPATIENT)
Dept: CARDIOLOGY | Facility: CLINIC | Age: 88
End: 2023-05-22
Payer: MEDICARE

## 2023-05-22 VITALS
DIASTOLIC BLOOD PRESSURE: 82 MMHG | SYSTOLIC BLOOD PRESSURE: 150 MMHG | RESPIRATION RATE: 17 BRPM | HEART RATE: 100 BPM | HEIGHT: 62 IN | BODY MASS INDEX: 22.82 KG/M2 | WEIGHT: 124 LBS | OXYGEN SATURATION: 97 %

## 2023-05-22 VITALS — DIASTOLIC BLOOD PRESSURE: 72 MMHG | SYSTOLIC BLOOD PRESSURE: 124 MMHG

## 2023-05-22 DIAGNOSIS — Z87.891 PERSONAL HISTORY OF NICOTINE DEPENDENCE: ICD-10-CM

## 2023-05-22 PROCEDURE — 99214 OFFICE O/P EST MOD 30 MIN: CPT

## 2023-05-22 PROCEDURE — 93000 ELECTROCARDIOGRAM COMPLETE: CPT

## 2023-05-22 NOTE — DISCUSSION/SUMMARY
[Patient] : the patient [Risks] : risks [Benefits] : benefits [Alternatives] : alternatives [___ Month(s)] : in [unfilled] month(s) [FreeTextEntry1] : Repeat labs pending  Continue aspirin amlodipine atorvastatin lisinopril metoprolol and Zetia from cardiac viewpoint.  Discussed with patient and daughter.  May be better off with reduced thyroid dosing, will d/w Dr. Polo.\par

## 2023-05-22 NOTE — HISTORY OF PRESENT ILLNESS
[FreeTextEntry1] : Follow-up visit history of ASHD and valvular disease.\par \par She denies palpitations chest pain shortness of breath.  She is not active physically.\par \par Thyroid medication dose was increased recently.\par \par \par

## 2023-05-22 NOTE — REASON FOR VISIT
[Arrhythmia/ECG Abnorrmalities] : arrhythmia/ECG abnormalities [Hypertension] : hypertension [Coronary Artery Disease] : coronary artery disease [Family Member] : family member [FreeTextEntry3] : Christ

## 2023-05-22 NOTE — CARDIOLOGY SUMMARY
[de-identified] : 11/21/22 sinus ivcd\par May 2017, 2023 sinus tachycardia with PVC ECG\par \par \par  [de-identified] : 2013 mild anterolateral ischemia [de-identified] : 2020 normal LV function mitral and tricuspid regurg\par \par October 2021 biatrial enlargement restrictive physiology moderate to severe mitral regurgitation normal LV systolic function pseudonormal diastolic function [de-identified] : 2014 moderate nonobstructive disease 2008 LAD stent

## 2023-05-22 NOTE — ASSESSMENT
[FreeTextEntry1] : Elderly lady with history of CAD History of prior stenting. Large hiatal hernia. Possible situational related A. fib not well documented at time of endoscopy\par \par Brief runs of SVT and VT noted on monitoring.\par \par Currently asymptomatic\par \par Her HR is elevated suspect due to increased synthroid doseing.

## 2023-05-22 NOTE — PHYSICAL EXAM
[Well Developed] : well developed [Well Nourished] : well nourished [No Acute Distress] : no acute distress [Normal Conjunctiva] : normal conjunctiva [Normal Venous Pressure] : normal venous pressure [No Carotid Bruit] : no carotid bruit [Normal S1, S2] : normal S1, S2 [No Murmur] : no murmur [No Rub] : no rub [No Gallop] : no gallop [Clear Lung Fields] : clear lung fields [Good Air Entry] : good air entry [No Respiratory Distress] : no respiratory distress  [No Edema] : no edema [No Cyanosis] : no cyanosis [No Rash] : no rash [No Skin Lesions] : no skin lesions [Moves all extremities] : moves all extremities [No Focal Deficits] : no focal deficits [Normal Speech] : normal speech [Normal memory] : normal memory [Cognitive Impairment] : cognitive impairment [de-identified] : Didn't recall her age correctly.

## 2023-06-01 ENCOUNTER — NON-APPOINTMENT (OUTPATIENT)
Age: 88
End: 2023-06-01

## 2023-06-05 ENCOUNTER — NON-APPOINTMENT (OUTPATIENT)
Age: 88
End: 2023-06-05

## 2023-08-30 ENCOUNTER — TRANSCRIPTION ENCOUNTER (OUTPATIENT)
Age: 88
End: 2023-08-30

## 2023-08-31 ENCOUNTER — INPATIENT (INPATIENT)
Facility: HOSPITAL | Age: 88
LOS: 4 days | Discharge: NURSING, REHAB OR LONG-TERM CARE | DRG: 481 | End: 2023-09-05
Attending: STUDENT IN AN ORGANIZED HEALTH CARE EDUCATION/TRAINING PROGRAM | Admitting: INTERNAL MEDICINE
Payer: MEDICARE

## 2023-08-31 VITALS
TEMPERATURE: 96 F | DIASTOLIC BLOOD PRESSURE: 79 MMHG | OXYGEN SATURATION: 96 % | HEART RATE: 89 BPM | SYSTOLIC BLOOD PRESSURE: 126 MMHG | RESPIRATION RATE: 16 BRPM | WEIGHT: 125 LBS

## 2023-08-31 DIAGNOSIS — Z90.710 ACQUIRED ABSENCE OF BOTH CERVIX AND UTERUS: Chronic | ICD-10-CM

## 2023-08-31 DIAGNOSIS — Z98.49 CATARACT EXTRACTION STATUS, UNSPECIFIED EYE: Chronic | ICD-10-CM

## 2023-08-31 DIAGNOSIS — S72.141A DISPLACED INTERTROCHANTERIC FRACTURE OF RIGHT FEMUR, INITIAL ENCOUNTER FOR CLOSED FRACTURE: ICD-10-CM

## 2023-08-31 DIAGNOSIS — Z98.89 OTHER SPECIFIED POSTPROCEDURAL STATES: Chronic | ICD-10-CM

## 2023-08-31 LAB
ALBUMIN SERPL ELPH-MCNC: 2.7 G/DL — LOW (ref 3.3–5)
ALP SERPL-CCNC: 105 U/L — SIGNIFICANT CHANGE UP (ref 40–120)
ALT FLD-CCNC: 20 U/L — SIGNIFICANT CHANGE UP (ref 10–45)
ANION GAP SERPL CALC-SCNC: 8 MMOL/L — SIGNIFICANT CHANGE UP (ref 5–17)
AST SERPL-CCNC: 25 U/L — SIGNIFICANT CHANGE UP (ref 10–40)
BASOPHILS # BLD AUTO: 0.05 K/UL — SIGNIFICANT CHANGE UP (ref 0–0.2)
BASOPHILS NFR BLD AUTO: 0.8 % — SIGNIFICANT CHANGE UP (ref 0–2)
BILIRUB SERPL-MCNC: 0.5 MG/DL — SIGNIFICANT CHANGE UP (ref 0.2–1.2)
BLD GP AB SCN SERPL QL: SIGNIFICANT CHANGE UP
BUN SERPL-MCNC: 20 MG/DL — SIGNIFICANT CHANGE UP (ref 7–23)
CALCIUM SERPL-MCNC: 8.9 MG/DL — SIGNIFICANT CHANGE UP (ref 8.4–10.5)
CHLORIDE SERPL-SCNC: 107 MMOL/L — SIGNIFICANT CHANGE UP (ref 96–108)
CO2 SERPL-SCNC: 28 MMOL/L — SIGNIFICANT CHANGE UP (ref 22–31)
CREAT SERPL-MCNC: 1.17 MG/DL — SIGNIFICANT CHANGE UP (ref 0.5–1.3)
EGFR: 43 ML/MIN/1.73M2 — LOW
EOSINOPHIL # BLD AUTO: 0.22 K/UL — SIGNIFICANT CHANGE UP (ref 0–0.5)
EOSINOPHIL NFR BLD AUTO: 3.4 % — SIGNIFICANT CHANGE UP (ref 0–6)
GLUCOSE SERPL-MCNC: 141 MG/DL — HIGH (ref 70–99)
HCT VFR BLD CALC: 32.2 % — LOW (ref 34.5–45)
HGB BLD-MCNC: 10.6 G/DL — LOW (ref 11.5–15.5)
IMM GRANULOCYTES NFR BLD AUTO: 0.5 % — SIGNIFICANT CHANGE UP (ref 0–0.9)
INR BLD: 0.89 RATIO — SIGNIFICANT CHANGE UP (ref 0.85–1.18)
LYMPHOCYTES # BLD AUTO: 1.21 K/UL — SIGNIFICANT CHANGE UP (ref 1–3.3)
LYMPHOCYTES # BLD AUTO: 18.7 % — SIGNIFICANT CHANGE UP (ref 13–44)
MCHC RBC-ENTMCNC: 30 PG — SIGNIFICANT CHANGE UP (ref 27–34)
MCHC RBC-ENTMCNC: 32.9 GM/DL — SIGNIFICANT CHANGE UP (ref 32–36)
MCV RBC AUTO: 91.2 FL — SIGNIFICANT CHANGE UP (ref 80–100)
MONOCYTES # BLD AUTO: 0.46 K/UL — SIGNIFICANT CHANGE UP (ref 0–0.9)
MONOCYTES NFR BLD AUTO: 7.1 % — SIGNIFICANT CHANGE UP (ref 2–14)
NEUTROPHILS # BLD AUTO: 4.49 K/UL — SIGNIFICANT CHANGE UP (ref 1.8–7.4)
NEUTROPHILS NFR BLD AUTO: 69.5 % — SIGNIFICANT CHANGE UP (ref 43–77)
NRBC # BLD: 0 /100 WBCS — SIGNIFICANT CHANGE UP (ref 0–0)
PLATELET # BLD AUTO: 192 K/UL — SIGNIFICANT CHANGE UP (ref 150–400)
POTASSIUM SERPL-MCNC: 3.8 MMOL/L — SIGNIFICANT CHANGE UP (ref 3.5–5.3)
POTASSIUM SERPL-SCNC: 3.8 MMOL/L — SIGNIFICANT CHANGE UP (ref 3.5–5.3)
PROT SERPL-MCNC: 6.3 G/DL — SIGNIFICANT CHANGE UP (ref 6–8.3)
PROTHROM AB SERPL-ACNC: 10.5 SEC — SIGNIFICANT CHANGE UP (ref 9.5–13)
RBC # BLD: 3.53 M/UL — LOW (ref 3.8–5.2)
RBC # FLD: 15.1 % — HIGH (ref 10.3–14.5)
SARS-COV-2 RNA SPEC QL NAA+PROBE: SIGNIFICANT CHANGE UP
SODIUM SERPL-SCNC: 143 MMOL/L — SIGNIFICANT CHANGE UP (ref 135–145)
TROPONIN I, HIGH SENSITIVITY RESULT: 10.5 NG/L — SIGNIFICANT CHANGE UP
TROPONIN I, HIGH SENSITIVITY RESULT: 14.7 NG/L — SIGNIFICANT CHANGE UP
WBC # BLD: 6.46 K/UL — SIGNIFICANT CHANGE UP (ref 3.8–10.5)
WBC # FLD AUTO: 6.46 K/UL — SIGNIFICANT CHANGE UP (ref 3.8–10.5)

## 2023-08-31 PROCEDURE — 99285 EMERGENCY DEPT VISIT HI MDM: CPT

## 2023-08-31 PROCEDURE — 99223 1ST HOSP IP/OBS HIGH 75: CPT

## 2023-08-31 PROCEDURE — 71045 X-RAY EXAM CHEST 1 VIEW: CPT | Mod: 26

## 2023-08-31 PROCEDURE — 72170 X-RAY EXAM OF PELVIS: CPT | Mod: 26

## 2023-08-31 PROCEDURE — 73552 X-RAY EXAM OF FEMUR 2/>: CPT | Mod: 26,RT

## 2023-08-31 PROCEDURE — 99222 1ST HOSP IP/OBS MODERATE 55: CPT

## 2023-08-31 PROCEDURE — 93010 ELECTROCARDIOGRAM REPORT: CPT

## 2023-08-31 PROCEDURE — 93306 TTE W/DOPPLER COMPLETE: CPT | Mod: 26

## 2023-08-31 PROCEDURE — 27245 TREAT THIGH FRACTURE: CPT | Mod: AS,RT

## 2023-08-31 DEVICE — SCREW LAG GAMMA 3 TI 10.5X95MM: Type: IMPLANTABLE DEVICE | Site: RIGHT | Status: FUNCTIONAL

## 2023-08-31 DEVICE — K-WIRE STRYKER 3.2M X 450MM: Type: IMPLANTABLE DEVICE | Site: RIGHT | Status: FUNCTIONAL

## 2023-08-31 DEVICE — IMPLANTABLE DEVICE: Type: IMPLANTABLE DEVICE | Site: RIGHT | Status: FUNCTIONAL

## 2023-08-31 DEVICE — SCREW LOCK FULLY THREADED 5X37.5MM: Type: IMPLANTABLE DEVICE | Site: RIGHT | Status: FUNCTIONAL

## 2023-08-31 RX ORDER — TAMSULOSIN HYDROCHLORIDE 0.4 MG/1
1 CAPSULE ORAL
Qty: 0 | Refills: 0 | DISCHARGE

## 2023-08-31 RX ORDER — AMLODIPINE BESYLATE 2.5 MG/1
5 TABLET ORAL DAILY
Refills: 0 | Status: DISCONTINUED | OUTPATIENT
Start: 2023-09-01 | End: 2023-09-05

## 2023-08-31 RX ORDER — MIRTAZAPINE 45 MG/1
1 TABLET, ORALLY DISINTEGRATING ORAL
Refills: 0 | DISCHARGE

## 2023-08-31 RX ORDER — ASPIRIN/CALCIUM CARB/MAGNESIUM 324 MG
81 TABLET ORAL DAILY
Refills: 0 | Status: DISCONTINUED | OUTPATIENT
Start: 2023-09-01 | End: 2023-09-01

## 2023-08-31 RX ORDER — MIRTAZAPINE 45 MG/1
7.5 TABLET, ORALLY DISINTEGRATING ORAL AT BEDTIME
Refills: 0 | Status: DISCONTINUED | OUTPATIENT
Start: 2023-08-31 | End: 2023-09-05

## 2023-08-31 RX ORDER — MORPHINE SULFATE 50 MG/1
2 CAPSULE, EXTENDED RELEASE ORAL ONCE
Refills: 0 | Status: DISCONTINUED | OUTPATIENT
Start: 2023-08-31 | End: 2023-08-31

## 2023-08-31 RX ORDER — METOPROLOL TARTRATE 50 MG
25 TABLET ORAL EVERY 12 HOURS
Refills: 0 | Status: DISCONTINUED | OUTPATIENT
Start: 2023-08-31 | End: 2023-09-05

## 2023-08-31 RX ORDER — ACETAMINOPHEN 500 MG
650 TABLET ORAL EVERY 6 HOURS
Refills: 0 | Status: DISCONTINUED | OUTPATIENT
Start: 2023-08-31 | End: 2023-09-05

## 2023-08-31 RX ORDER — SODIUM CHLORIDE 9 MG/ML
1000 INJECTION INTRAMUSCULAR; INTRAVENOUS; SUBCUTANEOUS
Refills: 0 | Status: DISCONTINUED | OUTPATIENT
Start: 2023-08-31 | End: 2023-09-05

## 2023-08-31 RX ORDER — LEVOTHYROXINE SODIUM 125 MCG
1 TABLET ORAL
Refills: 0 | DISCHARGE

## 2023-08-31 RX ORDER — ONDANSETRON 8 MG/1
4 TABLET, FILM COATED ORAL ONCE
Refills: 0 | Status: DISCONTINUED | OUTPATIENT
Start: 2023-08-31 | End: 2023-08-31

## 2023-08-31 RX ORDER — TAMSULOSIN HYDROCHLORIDE 0.4 MG/1
0.4 CAPSULE ORAL AT BEDTIME
Refills: 0 | Status: DISCONTINUED | OUTPATIENT
Start: 2023-08-31 | End: 2023-09-05

## 2023-08-31 RX ORDER — MIRTAZAPINE 45 MG/1
2 TABLET, ORALLY DISINTEGRATING ORAL
Qty: 0 | Refills: 0 | DISCHARGE

## 2023-08-31 RX ORDER — PANTOPRAZOLE SODIUM 20 MG/1
40 TABLET, DELAYED RELEASE ORAL
Refills: 0 | Status: DISCONTINUED | OUTPATIENT
Start: 2023-09-01 | End: 2023-09-05

## 2023-08-31 RX ORDER — ONDANSETRON 8 MG/1
4 TABLET, FILM COATED ORAL ONCE
Refills: 0 | Status: COMPLETED | OUTPATIENT
Start: 2023-08-31 | End: 2023-08-31

## 2023-08-31 RX ORDER — LEVOTHYROXINE SODIUM 125 MCG
75 TABLET ORAL DAILY
Refills: 0 | Status: DISCONTINUED | OUTPATIENT
Start: 2023-08-31 | End: 2023-09-05

## 2023-08-31 RX ORDER — FENTANYL CITRATE 50 UG/ML
25 INJECTION INTRAVENOUS
Refills: 0 | Status: DISCONTINUED | OUTPATIENT
Start: 2023-08-31 | End: 2023-08-31

## 2023-08-31 RX ORDER — VANCOMYCIN HCL 1 G
1000 VIAL (EA) INTRAVENOUS ONCE
Refills: 0 | Status: COMPLETED | OUTPATIENT
Start: 2023-09-01 | End: 2023-09-01

## 2023-08-31 RX ORDER — ATORVASTATIN CALCIUM 80 MG/1
40 TABLET, FILM COATED ORAL AT BEDTIME
Refills: 0 | Status: DISCONTINUED | OUTPATIENT
Start: 2023-08-31 | End: 2023-09-05

## 2023-08-31 RX ORDER — FENTANYL CITRATE 50 UG/ML
50 INJECTION INTRAVENOUS
Refills: 0 | Status: DISCONTINUED | OUTPATIENT
Start: 2023-08-31 | End: 2023-08-31

## 2023-08-31 RX ORDER — MORPHINE SULFATE 50 MG/1
2 CAPSULE, EXTENDED RELEASE ORAL EVERY 4 HOURS
Refills: 0 | Status: DISCONTINUED | OUTPATIENT
Start: 2023-08-31 | End: 2023-09-05

## 2023-08-31 RX ORDER — ONDANSETRON 8 MG/1
4 TABLET, FILM COATED ORAL EVERY 8 HOURS
Refills: 0 | Status: DISCONTINUED | OUTPATIENT
Start: 2023-08-31 | End: 2023-09-05

## 2023-08-31 RX ORDER — LANOLIN ALCOHOL/MO/W.PET/CERES
3 CREAM (GRAM) TOPICAL AT BEDTIME
Refills: 0 | Status: DISCONTINUED | OUTPATIENT
Start: 2023-08-31 | End: 2023-09-05

## 2023-08-31 RX ORDER — SODIUM CHLORIDE 9 MG/ML
1000 INJECTION INTRAMUSCULAR; INTRAVENOUS; SUBCUTANEOUS ONCE
Refills: 0 | Status: COMPLETED | OUTPATIENT
Start: 2023-08-31 | End: 2023-08-31

## 2023-08-31 RX ADMIN — SODIUM CHLORIDE 75 MILLILITER(S): 9 INJECTION INTRAMUSCULAR; INTRAVENOUS; SUBCUTANEOUS at 23:33

## 2023-08-31 RX ADMIN — MORPHINE SULFATE 2 MILLIGRAM(S): 50 CAPSULE, EXTENDED RELEASE ORAL at 11:21

## 2023-08-31 RX ADMIN — SODIUM CHLORIDE 75 MILLILITER(S): 9 INJECTION INTRAMUSCULAR; INTRAVENOUS; SUBCUTANEOUS at 11:21

## 2023-08-31 RX ADMIN — MORPHINE SULFATE 2 MILLIGRAM(S): 50 CAPSULE, EXTENDED RELEASE ORAL at 12:31

## 2023-08-31 RX ADMIN — ONDANSETRON 4 MILLIGRAM(S): 8 TABLET, FILM COATED ORAL at 09:42

## 2023-08-31 RX ADMIN — SODIUM CHLORIDE 1000 MILLILITER(S): 9 INJECTION INTRAMUSCULAR; INTRAVENOUS; SUBCUTANEOUS at 11:02

## 2023-08-31 RX ADMIN — MORPHINE SULFATE 2 MILLIGRAM(S): 50 CAPSULE, EXTENDED RELEASE ORAL at 09:42

## 2023-08-31 RX ADMIN — MORPHINE SULFATE 2 MILLIGRAM(S): 50 CAPSULE, EXTENDED RELEASE ORAL at 12:27

## 2023-08-31 NOTE — H&P ADULT - NSHPPHYSICALEXAM_GEN_ALL_CORE
T(C): 35.7 (08-31-23 @ 09:06), Max: 35.7 (08-31-23 @ 09:06)  HR: 77 (08-31-23 @ 11:09) (77 - 89)  BP: 115/73 (08-31-23 @ 11:09) (115/73 - 126/79)  RR: 15 (08-31-23 @ 11:09) (15 - 16)  SpO2: 99% (08-31-23 @ 11:09) (96% - 99%)  Wt(kg): --Vital Signs Last 24 Hrs  T(C): 35.7 (31 Aug 2023 09:06), Max: 35.7 (31 Aug 2023 09:06)  T(F): 96.3 (31 Aug 2023 09:06), Max: 96.3 (31 Aug 2023 09:06)  HR: 77 (31 Aug 2023 11:09) (77 - 89)  BP: 115/73 (31 Aug 2023 11:09) (115/73 - 126/79)  BP(mean): --  RR: 15 (31 Aug 2023 11:09) (15 - 16)  SpO2: 99% (31 Aug 2023 11:09) (96% - 99%)    PHYSICAL EXAM:  GENERAL: NAD, pleasant elderly female laying comfortably in bed  HEENT: NCAT  NECK: Supple, No JVD  CHEST/LUNG: Clear to percussion bilaterally; No rales, rhonchi, wheezing  HEART: Regular rate and rhythm; No murmurs  ABDOMEN: Soft, Nontender, Nondistended; Bowel sounds present  MUSCULOSKELETAL/EXTREMITIES:  2+ Peripheral Pulses, No LE edema  Right hip pain with palpation, RLE shorter than LLE and internally rotated  SKIN: No rashes or lesions  PSYCH: Appropriate affect  NEURO: AAO x 2-3, nonfocal

## 2023-08-31 NOTE — H&P ADULT - HISTORY OF PRESENT ILLNESS
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  History obtained from patient and family at bedside.She has been in her usual state of health, this morning she was walking to the kitchen when her legs gave out and she fell on her right side. She denies head trauma/LOC. She denies LH/dizziness/chest pain/palpitation/SOB prior to fall. She was unable to get up due to right hip pain and was brought in for evaluation.  She denies recent illness, no fever/chills, rest of ROS is negative.    In the ED, she was afebrile and hemodynamically stable.  Labs were otherwise unremarkable, anemia/Cr at baseline.  Xray showed right Intertrochanteric fracture with angulation at the fracture site noted with avulsed lesser trochanteric fragment.  She was given pain control and antiemetics.    Ortho was consulted, pending recommendations.    Patient lives at home alone, at baseline she ambulates with a cane for long distances. She is independent with her ADL's. She has METS <4. Denies chest pain/palpitation/SOB with exertion. She follows with Dr. Foote (cardiology) and had stress test done ~1 year ago which reportedly was unremarkable.   She had general anesthesia in the past without adverse reactions. No history of bleeding disorder.

## 2023-08-31 NOTE — CONSULT NOTE ADULT - ASSESSMENT
89 yo female with right intertrochanteric hip fx s/p fall    plan  - meds/cards clearance  - NPO  - OR tonight for right hip ORIF    case d/w Dr. Crowell, Bren
Please advised for refill. LOV was 5/20/19.   
89-year-old woman presents status post fall at home.  She was walking in her living room when suddenly she felt weak in her legs and felt to the ground.  ED work-up is demonstrating acute hip fracture.  Patient being considered for orthopedic surgery.  Prior cardiac history includes CAD, status post remote coronary intervention.  Possible arrhythmia, though no tachyarrhythmias had been documented.  Blood pressure stable.  There is no JVD.  Lung fields are clear.  No edema.  She is euvolemic.  EKG is sinus rhythm, non specific ST changes  Echo with preserved LV function and severe LAE  Outpatient nuclear stress test done April 2022 reported normal   There is no evidence of recent myocardial infarction congestive heart failure or malignant cardiac arrhythmia.  Current cardiac status appear to be at it's baseline.    Plan  - she is at moderately increased for aleksandr operative cardiac complications.  - planned surgery can proceed at this level of risk  - telemetry monitoring post op is indicated  - continue home meds  - pain management    discussed with patient and family at bedside  discussed with Hospitalist

## 2023-08-31 NOTE — ED ADULT TRIAGE NOTE - INTERNATIONAL TRAVEL
Patient has home meter self monitoring INR machine. Result today of 2.4. Continue current dose. Recheck when please?    No

## 2023-08-31 NOTE — ED PROVIDER NOTE - CLINICAL SUMMARY MEDICAL DECISION MAKING FREE TEXT BOX
Patient 89-year-old female past history of hypertension dyslipidemia coronary artery disease came to the emergency room chief complaint of pain and swelling on the right side of the hip status post trip and fall no loss of consciousness Patient's hip was wrapped up with a strap by the EMS with some relief  Patient was treated with morphine 2 mg in the emergency room significant relief x-ray of the pelvis and the hip shows a right-sided intertrochanteric fracture  Orthopedic surgery Dr. Wright called callback awaited Patient 89-year-old female past history of hypertension dyslipidemia coronary artery disease came to the emergency room chief complaint of pain and swelling on the right side of the hip status post trip and fall no loss of consciousness Patient's hip was wrapped up with a strap by the EMS with some relief  Patient was treated with morphine 2 mg in the emergency room significant relief x-ray of the pelvis and the hip shows a right-sided intertrochanteric fracture  Orthopedic surgery Dr. Wright called callback awaited  10 am Case discussed with the orthopedic surgeon plan to admit the patient for medical clearance and surgery later

## 2023-08-31 NOTE — ED ADULT NURSE NOTE - NSFALLHARMRISKINTERV_ED_ALL_ED

## 2023-08-31 NOTE — ED PROVIDER NOTE - PHYSICAL EXAMINATION
General:     NAD, well-nourished, well-appearing  Head:     NC/AT, EOMI, oral mucosa dry   Neck:     trachea midline  Lungs:     CTA b/l, no w/r/r  CVS:     S1S2, RRR, no m/g/r  Abd:     +BS, s/nt/nd, no organomegaly  Ext:    2+ radial and pedal pulses, no c/c/e  Right proximal femur tenderness deformity of the right leg inward rotated cap refill less than 2 seconds on the right foot  Neuro: AAOx3, no sensory/motor deficits

## 2023-08-31 NOTE — H&P ADULT - NSHPLABSRESULTS_GEN_ALL_CORE
LABS:                        10.6   6.46  )-----------( 192      ( 31 Aug 2023 09:28 )             32.2     08-31    143  |  107  |  20  ----------------------------<  141<H>  3.8   |  28  |  1.17    Ca    8.9      31 Aug 2023 09:28    TPro  6.3  /  Alb  2.7<L>  /  TBili  0.5  /  DBili  x   /  AST  25  /  ALT  20  /  AlkPhos  105  08-31    PT/INR - ( 31 Aug 2023 09:28 )   PT: 10.5 sec;   INR: 0.89 ratio      RADIOLOGY & ADDITIONAL TESTS:    CXR/Xray of Pelvis/Right Femur  IMPRESSION:    Portable chest: Exam is compared to October 11, 2021. Hiatal hernia again   noted with some increased density behind the heart which could represent   some atelectatic change. Right chest is clear. Anchors related to the   right humeral head as before. Degenerative changes both shoulders. ASCVD.   Heart is at the upper limits of normal in its transthoracic diameter.    AP pelvis. Intratrochanteric fracture of the right proximal femur with   angulation at the fracture site. There is deformity of the right superior   and inferior pubic rami compatible with fractures of indeterminate age is   seen on previous CT August 2019.. Tip is unremarkable. Regional vascular   calcifications noted. Follow-up suggested.    Right femur: Intertrochanteric fracture with angulation at the fracture   site noted with avulsed lesser trochanteric fragment noted. Osteopenia.   Old fractures related to the right superior and inferior pubic rami as   discussed above. Follow-up suggested as indicated clinically.    EKG: NSR, PVC, LBBB (new compared to EKG in 2021)

## 2023-08-31 NOTE — ED PROVIDER NOTE - OBJECTIVE STATEMENT
Patient 89-year-old female past history of hypertension dyslipidemia coronary artery disease came to the emergency room chief complaint of pain and swelling on the right side of the hip status post trip and fall no loss of consciousness Patient's hip was wrapped up with a strap by the EMS with some relief

## 2023-08-31 NOTE — PROGRESS NOTE ADULT - SUBJECTIVE AND OBJECTIVE BOX
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    Traumatic R hip fx      POD #0  repair R hip fx with trochanteric kat    Saturated dressing.  ABLA    Dressing reinforced.    No AC or antiplatelets as OP.    Coags wnl      No sense of hematoma     If it persists check CBC     D/W the surgeon no concern for any vascular injuries

## 2023-08-31 NOTE — PRE-OP CHECKLIST - TO WHOM
Patient last saw Dr. Hinojosa in 2019. Patient has new issue of right foot ankle pain. Patient is scheduled for [place] on 9/28 and is inquiring about being prescribed 'pain patches' from Dr. Hinojosa. Patient is aware Dr. Hinojosa is out of the office until Monday. Please advise.   Violette

## 2023-08-31 NOTE — H&P ADULT - ATTENDING COMMENTS
Patient seen and examined at bedside. Her pain is controlled, no other focal complaints.  On exam, she is a frail elderly female in NAD, heart RRR w/o murmur, lungs are CTA b/l, abdomen soft, NT/ND, right hip pain reproducible with palpation, RLE is shorter and internally rotated compared to LLE.  Agree with plan as above, right intertrochanteric fracture s/p fall, plan for ORIF with ortho today, echo and cardiology for optimization.

## 2023-08-31 NOTE — CONSULT NOTE ADULT - SUBJECTIVE AND OBJECTIVE BOX
[facility] Consultation    CHIEF COMPLAINT: Patient is a 89y old  Female who presents with a chief complaint of Status Post Fall with Right Hip Pain (31 Aug 2023 11:12)  Asked to see patient regarding preoperative cardiac clearance.  Patient with hip fracture and being considered for orthopedic surgery.  The patient is seen and examined.  History is obtained from discussion with the patient and her family at bedside.  They are of good reliability.  The chart is reviewed including outpatient records.    The patient is a 89-year-old woman presented today to the ER after a fall at home.  She states that she was walking in her living room, and suddenly her leg gave out and she fell to the ground.  She had a cane nearby, was able to pound on the floor, a relative who lives next door came and checked on her.  The patient's son and daughter were called, and she was then brought to the ED.  In the ED, x-rays demonstrated acute hip fracture.  Orthopedics has been consulted, and surgery is being considered possibly for later today.  She denies any loss of consciousness.  Mostly sedentary lifestyle at home, denies any complaints of chest pain or chest pressure.  No shortness of breath.  Denies palpitations.  No dizziness.  No syncope.  No edema.  No orthopnea.  No PND.      Past history: Remote history of coronary stent placement.  More recently, there was concern for arrhythmia as tachycardia was noted prior to a planned endoscopic procedure.  No arrhythmias were documented.  Subsequent outpatient work-up did not demonstrate arrhythmia as well.  Hypertension, hyperlipidemia, chronic back pain.  Previous cancer of the rectum, status post radiation and chemotherapy.  Status post remote hysterectomy.  Current medications: Atorvastatin, metoprolol, amlodiine, aspirin, mirtazapine, tamsulosin   Allergies: Hydrochlorothiazide, cefuroxime, levofloxacin, sulfamethoxazole.  Social history: Non-smoker.  No alcohol.  She retired at age 80.  She was driving up until age 89.  Lives alone but has close family will support.  Family history: Noncontributory.    Home Medications:  amLODIPine 5 mg oral tablet: 1 tab(s) orally once a day (31 Aug 2023 09:44)  atorvastatin 40 mg oral tablet: 1 tab(s) orally once a day (at bedtime) (31 Aug 2023 09:44)  Ecotrin 81 mg oral delayed release tablet: 1 tab(s) orally once a day (31 Aug 2023 09:44)  levothyroxine 75 mcg (0.075 mg) oral tablet: 1 tab(s) orally once a day (31 Aug 2023 11:37)  Metoprolol Tartrate 25 mg oral tablet: 1 tab(s) orally 2 times a day (31 Aug 2023 09:44)  mirtazapine 7.5 mg oral tablet: 1 tab(s) orally once a day (at bedtime) (31 Aug 2023 11:37)  Multiple Vitamins oral tablet: 1 tab(s) orally once a day (31 Aug 2023 11:38)  tamsulosin 0.4 mg oral capsule: 1 cap(s) orally once a day (at bedtime) (31 Aug 2023 09:44)        MEDICATIONS  (STANDING):  atorvastatin 40 milliGRAM(s) Oral at bedtime  levothyroxine 75 MICROGram(s) Oral daily  metoprolol tartrate 25 milliGRAM(s) Oral every 12 hours  mirtazapine 7.5 milliGRAM(s) Oral at bedtime  sodium chloride 0.9%. 1000 milliLiter(s) (75 mL/Hr) IV Continuous <Continuous>  tamsulosin 0.4 milliGRAM(s) Oral at bedtime    MEDICATIONS  (PRN):  acetaminophen     Tablet .. 650 milliGRAM(s) Oral every 6 hours PRN Temp greater or equal to 38C (100.4F), Mild Pain (1 - 3)  aluminum hydroxide/magnesium hydroxide/simethicone Suspension 30 milliLiter(s) Oral every 4 hours PRN Dyspepsia  melatonin 3 milliGRAM(s) Oral at bedtime PRN Insomnia  morphine  - Injectable 2 milliGRAM(s) IV Push every 4 hours PRN Moderate Pain (4 - 6)  ondansetron Injectable 4 milliGRAM(s) IV Push every 8 hours PRN Nausea and/or Vomiting      Allergies    hydrochlorothiazide (Anaphylaxis; Angioedema)  cefuroxime (Dystonic RXN)  levofloxacin (Flushing)  sulfamethoxazole (Unknown)    Intolerances        REVIEW OF SYSTEMS:    CONSTITUTIONAL: No weakness, fevers or chills  EYES: No visual changes, No diplopia  ENMT: No throat pain , No exudate  NECK: No pain or stiffness  RESPIRATORY: No cough, wheezing, hemoptysis; No shortness of breath  CARDIOVASCULAR: No chest pain or chest pressure.  No shortness of breath or dyspnea on exertion.  No palpitations, dizziness, light headedness, syncope or near syncope.  No edema, no orthopnea.   GASTROINTESTINAL: No abdominal pain. No nausea, vomiting, or hematemesis; No diarrhea or constipation. No melena or hematochezia.  GENITOURINARY: No dysuria, frequency or hematuria  NEUROLOGICAL: No numbness or weakness  SKIN: No itching or rash  All other review of systems is negative unless indicated above    VITAL SIGNS:   Vital Signs Last 24 Hrs  T(C): 35.7 (31 Aug 2023 09:06), Max: 35.7 (31 Aug 2023 09:06)  T(F): 96.3 (31 Aug 2023 09:06), Max: 96.3 (31 Aug 2023 09:06)  HR: 80 (31 Aug 2023 12:29) (77 - 89)  BP: 113/60 (31 Aug 2023 12:29) (113/60 - 126/79)  BP(mean): --  RR: 16 (31 Aug 2023 12:29) (15 - 16)  SpO2: 98% (31 Aug 2023 12:29) (96% - 99%)    Parameters below as of 31 Aug 2023 12:29  Patient On (Oxygen Delivery Method): room air        I&O's Summary      PHYSICAL EXAM:    Constitutional: NAD, awake and alert, well-developed  Eyes:  EOMI,  Pupils round, no lesions  ENMT: no exudate or erythema  Pulmonary: Non-labored, breath sounds are clear bilaterally, No wheezing, rales or rhonchi  Cardiovascular: PMI not palpable non-displaced Regular S1 and S2 ll/Vl systolic   Gastrointestinal: Bowel Sounds present, soft, nontender.   Lymph: No peripheral edema. No cervical lymphadenopathy.  Neurological: Alert, no focal deficits  Skin: No rashes. Changes of chronic venous stasis. No cyanosis.  Psych:  Mood & affect appropriate    LABS: All Labs Reviewed:                        10.6   6.46  )-----------( 192      ( 31 Aug 2023 09:28 )             32.2     31 Aug 2023 09:28    143    |  107    |  20     ----------------------------<  141    3.8     |  28     |  1.17     Ca    8.9        31 Aug 2023 09:28    TPro  6.3    /  Alb  2.7    /  TBili  0.5    /  DBili  x      /  AST  25     /  ALT  20     /  AlkPhos  105    31 Aug 2023 09:28    PT/INR - ( 31 Aug 2023 09:28 )   PT: 10.5 sec;   INR: 0.89 ratio         < from: 12 Lead ECG (08.27.21 @ 13:27) >  Diagnosis Line Normal sinus rhythm  Nonspecific ST and T wave abnormality  Abnormal ECG  When compared with ECG of 24-AUG-2019 12:51,  premature ventricular complexes are no longer present    < end of copied text >  < from: TTE Echo Complete w/o Contrast w/ Doppler (08.31.23 @ 12:57) >   1. Left ventricular ejection fraction, by visual estimation, is 50 to   55%.   2. Normal global left ventricular systolic function.   3. Severely enlarged left atrium.   4. Spectral Doppler shows impaired relaxation pattern of left   ventricular myocardial filling (Grade I diastolic dysfunction).   5.There is mild septal left ventricular hypertrophy.   6. Mild mitral valve regurgitation.   7. Thickening and calcification of the anterior and posterior mitral   valve leaflets.   8. Mild-moderate tricuspid regurgitation.   9. Mild aortic valve stenosis.    < end of copied text >    outpatient nuclear stress test done April 2022 ... no evidence of MI or ischemia     
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  History obtained from patient and family at bedside.She has been in her usual state of health, this morning she was walking to the kitchen when her legs gave out and she fell on her right side. She denies head trauma/LOC. She denies LH/dizziness/chest pain/palpitation/SOB prior to fall. She was unable to get up due to right hip pain and was brought in for evaluation.  She denies recent illness, no fever/chills, rest of ROS is negative.    In the ED, she was afebrile and hemodynamically stable.  Labs were otherwise unremarkable, anemia/Cr at baseline.  Xray showed right Intertrochanteric fracture with angulation at the fracture site noted with avulsed lesser trochanteric fragment.  She was given pain control and antiemetics.    Patient lives at home alone, at baseline she ambulates with a cane for long distances. She is independent with her ADL's. She has METS <4. Denies chest pain/palpitation/SOB with exertion. She follows with Dr. Foote (cardiology) and had stress test done ~1 year ago which reportedly was unremarkable.   She had general anesthesia in the past without adverse reactions. 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 1000 milliLiter(s) (75 mL/Hr) IV Continuous <Continuous>  tamsulosin 0.4 milliGRAM(s) Oral at bedtime    Allergies    hydrochlorothiazide (Anaphylaxis; Angioedema)  cefuroxime (Dystonic RXN)  levofloxacin (Flushing)  sulfamethoxazole (Unknown)    Intolerances    SHx - Nc    FHx - NC    Vital Signs Last 24 Hrs  T(C): 35.7 (31 Aug 2023 09:06), Max: 35.7 (31 Aug 2023 09:06)  T(F): 96.3 (31 Aug 2023 09:06), Max: 96.3 (31 Aug 2023 09:06)  HR: 82 (31 Aug 2023 17:00) (77 - 89)  BP: 100/60 (31 Aug 2023 17:00) (100/60 - 126/79)  RR: 15 (31 Aug 2023 17:00) (15 - 16)  SpO2: 98% (31 Aug 2023 17:00) (96% - 99%)    Parameters below as of 31 Aug 2023 17:00  Patient On (Oxygen Delivery Method): room air    GENERAL: NAD, pleasant elderly female laying comfortably in bed  HEENT: NCAT  NECK: Supple, No JVD  ABDOMEN: Soft, Nontender, Nondistended; Bowel sounds present  MUSCULOSKELETAL/EXTREMITIES:  2+ Peripheral Pulses, No LE edema  Right hip pain with palpation, RLE shorter than LLE and internally rotated  SKIN: No rashes or lesions  PSYCH: Appropriate affect  NEURO: AAO x 2-3, nonfocal                          10.6   6.46  )-----------( 192      ( 31 Aug 2023 09:28 )             32.2     Comprehensive Metabolic Panel (08.31.23 @ 09:28)    Sodium: 143 mmol/L   Potassium: 3.8 mmol/L   Chloride: 107 mmol/L   Carbon Dioxide: 28 mmol/L   Anion Gap: 8 mmol/L   Blood Urea Nitrogen: 20 mg/dL   Creatinine: 1.17 mg/dL   Glucose: 141 mg/dL   Calcium: 8.9 mg/dL   Protein Total: 6.3 g/dL   Albumin: 2.7 g/dL   Bilirubin Total: 0.5 mg/dL   Alkaline Phosphatase: 105 U/L   Aspartate Aminotransferase (AST/SGOT): 25 U/L   Alanine Aminotransferase (ALT/SGPT): 20 U/L   eGFR: 43    PT/INR - ( 31 Aug 2023 09:28 )   PT: 10.5 sec;   INR: 0.89 ratio         < from: Xray Femur 2 Views, Right (08.31.23 @ 09:18) >  AP pelvis. Intratrochanteric fracture of the right proximal femur with   angulation at the fracture site. There is deformity of the right superior   and inferior pubic rami compatible with fractures of indeterminate age is   seen on previous CT August 2019.. Tip is unremarkable. Regional vascular   calcifications noted. Follow-up suggested.    Right femur: Intertrochanteric fracture with angulation at the fracture   site noted with avulsed lesser trochanteric fragment noted. Osteopenia.   Old fractures related to the right superior and inferior pubic rami as   discussed above. Follow-up suggested as indicated clinically.    < end of copied text >

## 2023-08-31 NOTE — PATIENT PROFILE ADULT - SURGICAL SITE DESCRIPTION
right hip aquacel with combines for reinforcement - pt had bleeding from surgical site in PACU - surgeon and ICU PA aware.  Dressing reinforced by surgeon

## 2023-08-31 NOTE — PATIENT PROFILE ADULT - FALL HARM RISK - HARM RISK INTERVENTIONS
Assistance with ambulation/Assistance OOB with selected safe patient handling equipment/Communicate Risk of Fall with Harm to all staff/Discuss with provider need for PT consult/Monitor gait and stability/Reinforce activity limits and safety measures with patient and family/Sit up slowly, dangle for a short time, stand at bedside before walking/Tailored Fall Risk Interventions/Use of alarms - bed, chair and/or voice tab/Visual Cue: Yellow wristband and red socks/Bed in lowest position, wheels locked, appropriate side rails in place/Call bell, personal items and telephone in reach/Instruct patient to call for assistance before getting out of bed or chair/Non-slip footwear when patient is out of bed/[place] to call system/Physically safe environment - no spills, clutter or unnecessary equipment/Purposeful Proactive Rounding/Room/bathroom lighting operational, light cord in reach

## 2023-08-31 NOTE — ED ADULT TRIAGE NOTE - CHIEF COMPLAINT QUOTE
BIBA from home, s/p trip and fall, right hip pain, lower extremity grossly rotated, 2+ pedal pulses present, pelvic immobilizer intact

## 2023-08-31 NOTE — H&P ADULT - ASSESSMENT
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    Right Intertrochanteric Fracture s/p Fall  -Xray imaging reviewed  -Discussed with surgery team, plan for ORIF this evening with orthopedics  -NWB RLE   -NPO  -IVF hydration   -Pain control PRN     CAD s/p PCI/Grade II Diastolic Dysfunction with EF 55-60%  HTN/HLD/Valvular Disease (Mod-Severe MR, Mod TR)/Mod pHTN  -EKG showed new LBBB compared to previous EKG in 2021, trop negative x1 and patient has no cardiac complaints  -Discussed with surgery team, okay to continue baby aspirin  -Continue home meds: metoprolol, amlodipine, statin   -Repeat echo to evaluate valvular disease  -Cardiology (Dr. Segura) consulted for clearance    Hypothyroidism  -Continue levothyroxine    CKD  -Baseline Cr 0.9-1.1 range, stable  -Avoid nephrotoxic agents    Urinary Retention  -Continue tamsulosin  -Monitor for s/s retention, bladder scan as needed     Hiatal Hernia  -Continue PPI     Depression  -Continue mirtazapine    Anemia  -Hb at baseline  -Continue to monitor CBC    DVT PPx  -Holding chemical prophylaxis pending ortho procedure     Pre-Op Risk Assessment    RCRI: 2 points, Class III risk, 10.1% 30 day risk of death, MI, or cardiac arrest    [place]: 2.1% risk of MI or cardiac arrest, intraoperatively or up to 30 days post-op     [place]: 4.2% risk for respiratory failure     ARISCAT: 13.3% risk for pulmonary complication     Impression: Patient with moderate-high cardiac and moderate pulmonary risk for intermediate risk procedure.    PENDING ECHO AND CARDIOLOGY CLEARANCE FOR PLANNED PROCEDURE.      Plan of care discussed with patient and family (Lon (son) [phone] and Michaelle (DIL) [phone]) at bedside. All questions were answered. Son and DIL are patient's HCP's. She has a DNR/DNI which will be rescinded for the procedure.  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    Right Intertrochanteric Fracture s/p Fall  -Xray imaging reviewed  -Discussed with surgery team, plan for ORIF this evening with orthopedics  -NWB RLE   -NPO  -IVF hydration   -Pain control PRN     CAD s/p PCI/Grade II Diastolic Dysfunction with EF 55-60%  HTN/HLD/Valvular Disease (Mod-Severe MR, Mod TR)/Mod pHTN  -EKG showed new LBBB compared to previous EKG in 2021, trop negative x1 and patient has no cardiac complaints  -Discussed with surgery team, okay to continue baby aspirin  -Continue home meds: metoprolol, amlodipine, statin   -Repeat echo to evaluate valvular disease  -Cardiology (Dr. Segura) consulted for clearance    Hypothyroidism  -Continue levothyroxine    CKD  -Baseline Cr 0.9-1.1 range, stable  -Avoid nephrotoxic agents    Urinary Retention  -Continue tamsulosin  -Monitor for s/s retention, bladder scan as needed     Hiatal Hernia  -Continue PPI     Depression  -Continue mirtazapine    Anemia  -Hb at baseline  -Continue to monitor CBC    DVT PPx  -Holding chemical prophylaxis pending ortho procedure     D/w Dr. Szymanski    Pre-Op Risk Assessment    RCRI: 2 points, Class III risk, 10.1% 30 day risk of death, MI, or cardiac arrest    [place]: 2.1% risk of MI or cardiac arrest, intraoperatively or up to 30 days post-op     [place]: 4.2% risk for respiratory failure     ARISCAT: 13.3% risk for pulmonary complication     Impression: Patient with moderate-high cardiac and moderate pulmonary risk for intermediate risk procedure.    PENDING ECHO AND CARDIOLOGY CLEARANCE FOR PLANNED PROCEDURE.      Plan of care discussed with patient and family (Lon (son) [phone] and Michaelle (DIL) [phone]) at bedside. All questions were answered. Son and DIL are patient's HCP's. She has a DNR/DNI which will be rescinded for the procedure.

## 2023-08-31 NOTE — GOALS OF CARE CONVERSATION - ADVANCED CARE PLANNING - CONVERSATION DETAILS
Pt. here from home with right hip pain s/p fall. She has hx. rectal ca. and CAD. Daughter Michaelle at bedside. Pt. had old DNR/I MOLST signed by Michaelle October 2021. I reviewed this with the daughter, who states that this is still GOC. I reviewed with her that if the patient requires surgery, the DNR/I will be rescinded for the surgery.

## 2023-09-01 LAB
ANION GAP SERPL CALC-SCNC: 13 MMOL/L — SIGNIFICANT CHANGE UP (ref 5–17)
BUN SERPL-MCNC: 25 MG/DL — HIGH (ref 7–23)
CALCIUM SERPL-MCNC: 8.3 MG/DL — LOW (ref 8.4–10.5)
CHLORIDE SERPL-SCNC: 109 MMOL/L — HIGH (ref 96–108)
CO2 SERPL-SCNC: 20 MMOL/L — LOW (ref 22–31)
CREAT SERPL-MCNC: 1.1 MG/DL — SIGNIFICANT CHANGE UP (ref 0.5–1.3)
EGFR: 47 ML/MIN/1.73M2 — LOW
GLUCOSE SERPL-MCNC: 198 MG/DL — HIGH (ref 70–99)
HCT VFR BLD CALC: 22.4 % — LOW (ref 34.5–45)
HCT VFR BLD CALC: 25.3 % — LOW (ref 34.5–45)
HGB BLD-MCNC: 7 G/DL — CRITICAL LOW (ref 11.5–15.5)
HGB BLD-MCNC: 8.3 G/DL — LOW (ref 11.5–15.5)
MCHC RBC-ENTMCNC: 29.3 PG — SIGNIFICANT CHANGE UP (ref 27–34)
MCHC RBC-ENTMCNC: 29.9 PG — SIGNIFICANT CHANGE UP (ref 27–34)
MCHC RBC-ENTMCNC: 31.3 GM/DL — LOW (ref 32–36)
MCHC RBC-ENTMCNC: 32.8 GM/DL — SIGNIFICANT CHANGE UP (ref 32–36)
MCV RBC AUTO: 91 FL — SIGNIFICANT CHANGE UP (ref 80–100)
MCV RBC AUTO: 93.7 FL — SIGNIFICANT CHANGE UP (ref 80–100)
NRBC # BLD: 0 /100 WBCS — SIGNIFICANT CHANGE UP (ref 0–0)
NRBC # BLD: 0 /100 WBCS — SIGNIFICANT CHANGE UP (ref 0–0)
PLATELET # BLD AUTO: 131 K/UL — LOW (ref 150–400)
PLATELET # BLD AUTO: 150 K/UL — SIGNIFICANT CHANGE UP (ref 150–400)
POTASSIUM SERPL-MCNC: 4.2 MMOL/L — SIGNIFICANT CHANGE UP (ref 3.5–5.3)
POTASSIUM SERPL-SCNC: 4.2 MMOL/L — SIGNIFICANT CHANGE UP (ref 3.5–5.3)
RBC # BLD: 2.39 M/UL — LOW (ref 3.8–5.2)
RBC # BLD: 2.78 M/UL — LOW (ref 3.8–5.2)
RBC # FLD: 14.6 % — HIGH (ref 10.3–14.5)
RBC # FLD: 15.1 % — HIGH (ref 10.3–14.5)
SODIUM SERPL-SCNC: 142 MMOL/L — SIGNIFICANT CHANGE UP (ref 135–145)
WBC # BLD: 11.72 K/UL — HIGH (ref 3.8–10.5)
WBC # BLD: 11.9 K/UL — HIGH (ref 3.8–10.5)
WBC # FLD AUTO: 11.72 K/UL — HIGH (ref 3.8–10.5)
WBC # FLD AUTO: 11.9 K/UL — HIGH (ref 3.8–10.5)

## 2023-09-01 PROCEDURE — 99232 SBSQ HOSP IP/OBS MODERATE 35: CPT | Mod: FS

## 2023-09-01 RX ORDER — ASPIRIN/CALCIUM CARB/MAGNESIUM 324 MG
81 TABLET ORAL
Refills: 0 | Status: DISCONTINUED | OUTPATIENT
Start: 2023-09-01 | End: 2023-09-05

## 2023-09-01 RX ORDER — ACETAMINOPHEN 500 MG
650 TABLET ORAL EVERY 6 HOURS
Refills: 0 | Status: DISCONTINUED | OUTPATIENT
Start: 2023-09-01 | End: 2023-09-01

## 2023-09-01 RX ORDER — SODIUM CHLORIDE 9 MG/ML
1000 INJECTION, SOLUTION INTRAVENOUS ONCE
Refills: 0 | Status: COMPLETED | OUTPATIENT
Start: 2023-09-01 | End: 2023-09-01

## 2023-09-01 RX ORDER — SODIUM CHLORIDE 9 MG/ML
1000 INJECTION INTRAMUSCULAR; INTRAVENOUS; SUBCUTANEOUS ONCE
Refills: 0 | Status: COMPLETED | OUTPATIENT
Start: 2023-09-01 | End: 2023-09-01

## 2023-09-01 RX ADMIN — TAMSULOSIN HYDROCHLORIDE 0.4 MILLIGRAM(S): 0.4 CAPSULE ORAL at 21:42

## 2023-09-01 RX ADMIN — SODIUM CHLORIDE 75 MILLILITER(S): 9 INJECTION INTRAMUSCULAR; INTRAVENOUS; SUBCUTANEOUS at 18:46

## 2023-09-01 RX ADMIN — SODIUM CHLORIDE 1000 MILLILITER(S): 9 INJECTION, SOLUTION INTRAVENOUS at 01:00

## 2023-09-01 RX ADMIN — ATORVASTATIN CALCIUM 40 MILLIGRAM(S): 80 TABLET, FILM COATED ORAL at 00:36

## 2023-09-01 RX ADMIN — MIRTAZAPINE 7.5 MILLIGRAM(S): 45 TABLET, ORALLY DISINTEGRATING ORAL at 00:36

## 2023-09-01 RX ADMIN — Medication 75 MICROGRAM(S): at 06:44

## 2023-09-01 RX ADMIN — ATORVASTATIN CALCIUM 40 MILLIGRAM(S): 80 TABLET, FILM COATED ORAL at 21:42

## 2023-09-01 RX ADMIN — MIRTAZAPINE 7.5 MILLIGRAM(S): 45 TABLET, ORALLY DISINTEGRATING ORAL at 21:42

## 2023-09-01 RX ADMIN — PANTOPRAZOLE SODIUM 40 MILLIGRAM(S): 20 TABLET, DELAYED RELEASE ORAL at 06:45

## 2023-09-01 RX ADMIN — Medication 250 MILLIGRAM(S): at 10:03

## 2023-09-01 RX ADMIN — Medication 81 MILLIGRAM(S): at 18:53

## 2023-09-01 RX ADMIN — Medication 81 MILLIGRAM(S): at 12:48

## 2023-09-01 RX ADMIN — Medication 25 MILLIGRAM(S): at 17:32

## 2023-09-01 RX ADMIN — TAMSULOSIN HYDROCHLORIDE 0.4 MILLIGRAM(S): 0.4 CAPSULE ORAL at 00:35

## 2023-09-01 RX ADMIN — SODIUM CHLORIDE 1000 MILLILITER(S): 9 INJECTION INTRAMUSCULAR; INTRAVENOUS; SUBCUTANEOUS at 17:27

## 2023-09-01 NOTE — OCCUPATIONAL THERAPY INITIAL EVALUATION ADULT - PERTINENT HX OF CURRENT PROBLEM, REHAB EVAL
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    Traumatic R hip fx    POD #1  repair R hip fx with trochanteric kat

## 2023-09-01 NOTE — OCCUPATIONAL THERAPY INITIAL EVALUATION ADULT - GENERAL OBSERVATIONS, REHAB EVAL
Pt received supine in bed +primafit, +IV, right hip dressing; verbalizing 0/10 pain; agreeable to OT IE, daughter present bedside; pt left as received supine in bed, head of bed elevated, all lines intact, NAD, call bell in reach. Pt received supine in bed +primafit, +IV, right hip dressing; verbalizing 0/10 pain; agreeable to OT IE, daughter present bedside; pt left as received supine in bed, head of bed elevated, all lines intact, NAD, right hip bandage saturated with drainage and blood- NP present, RN notified, call bell in reach.

## 2023-09-01 NOTE — PROGRESS NOTE ADULT - ASSESSMENT
89y Female admitted POD # 1 from ORIF, hip, using trochanteric kat          PLAN:    -serial exams, any changes in exam to be escelated to surgical team immedietly  -pain control  -finished  post op anbx  -started on regular diet   -dressing changes per surgical team  -DVT prophylaxis  -AM labs           TIME SPENT:  35 minutes of  time spent providing medical care for patient's acute illness/conditions that impairs at least one vital organ system and/or poses a high risk of imminent or life threatening deterioration in the patient's condition. It includes time spent evaluating and treating the patient's acute illness as well as time spent reviewing labs, radiology, discussing goals of care with patient and/or patient's family, and discussing the case with a multidisciplinary team, including the eICU, in an effort to prevent further life threatening deterioration or end organ damage. This time is independent of any procedures performed.

## 2023-09-01 NOTE — OCCUPATIONAL THERAPY INITIAL EVALUATION ADULT - ADDITIONAL COMMENTS
As per pt, pt lives alone in a private 2L home, 1st floor set up, 4-5 JANETTE with rail. As per pt, PTA was independent with ADLs but required assist with IADLs (no stove cooking and daughter cleaned), stall shower with shower chair, does not drive anymore. Pt ambulated with no device within the home, but with cane out in the community As per pt, pt lives alone in a private 2 family 2L home, 1st floor set up, 4-5 JANETTE with rail. As per daughter, PTA was independent with ADLs but required assist with IADLs (no stove cooking and daughter cleaned), stall shower with shower chair, pt does not drive anymore. Pt ambulated with no device within the home, but with cane out in the community

## 2023-09-01 NOTE — PROGRESS NOTE ADULT - SUBJECTIVE AND OBJECTIVE BOX
Patient is a 89y old  Female who presents with a chief complaint of Status Post Fall with Right Hip Pain .  Patient admitted and underwent Right ORIF IT fx POD #1  Patient seen and examined at bedside. Patient is lying in bed family at her side.   She is not complaining of surgical discomfort .    She is presently getting a unit of blood .   She will begin PT when her hgb stable.    She denies any CP or SOB.   She was in retention this am but presently voiding without a problem.       ALLERGIES:  hydrochlorothiazide (Anaphylaxis; Angioedema)  cefuroxime (Dystonic RXN)  levofloxacin (Flushing)  sulfamethoxazole (Unknown)    MEDICATIONS  (STANDING):  amLODIPine   Tablet 5 milliGRAM(s) Oral daily  aspirin enteric coated 81 milliGRAM(s) Oral daily  atorvastatin 40 milliGRAM(s) Oral at bedtime  levothyroxine 75 MICROGram(s) Oral daily  metoprolol tartrate 25 milliGRAM(s) Oral every 12 hours  mirtazapine 7.5 milliGRAM(s) Oral at bedtime  pantoprazole    Tablet 40 milliGRAM(s) Oral before breakfast  sodium chloride 0.9%. 1000 milliLiter(s) (75 mL/Hr) IV Continuous <Continuous>  tamsulosin 0.4 milliGRAM(s) Oral at bedtime    MEDICATIONS  (PRN):  acetaminophen     Tablet .. 650 milliGRAM(s) Oral every 6 hours PRN Temp greater or equal to 38C (100.4F), Mild Pain (1 - 3)  aluminum hydroxide/magnesium hydroxide/simethicone Suspension 30 milliLiter(s) Oral every 4 hours PRN Dyspepsia  melatonin 3 milliGRAM(s) Oral at bedtime PRN Insomnia  morphine  - Injectable 2 milliGRAM(s) IV Push every 4 hours PRN Moderate Pain (4 - 6)  ondansetron Injectable 4 milliGRAM(s) IV Push every 8 hours PRN Nausea and/or Vomiting    Vital Signs Last 24 Hrs  T(F): 98.3 (01 Sep 2023 10:50), Max: 98.3 (01 Sep 2023 10:50)  HR: 110 (01 Sep 2023 14:10) (70 - 110)  BP: 124/66 (01 Sep 2023 10:50) (81/48 - 124/66)  RR: 19 (01 Sep 2023 10:50) (13 - 20)  SpO2: 97% (01 Sep 2023 14:10) (94% - 100%)  I&O's Summary    31 Aug 2023 07:01  -  01 Sep 2023 07:00  --------------------------------------------------------  IN: 1600 mL / OUT: 0 mL / NET: 1600 mL    01 Sep 2023 07:01  -  01 Sep 2023 15:37  --------------------------------------------------------  IN: 308 mL / OUT: 0 mL / NET: 308 mL      PHYSICAL EXAM:  General: NAD, A/O X 3  ENT: MMM  Neck: Supple, No JVD  Lungs: Clear to auscultation bilaterally  Cardio: RRR, S1/S2, No murmurs  Abdomen: Soft, Nontender, Nondistended; Bowel sounds present, voiding with primafit   Ext:   Right hip dressing reinforced with ABD s for post surgical ooze.   Thigh with edema but supple. + neurovascular intact , Foot warm good CR  no calf tenderness SCD s bilateral while in bed..      LABS:                        8.3    11.72 )-----------( 131      ( 01 Sep 2023 15:18 )             25.3     09-01    142  |  109  |  25  ----------------------------<  198  4.2   |  20  |  1.10    Ca    8.3      01 Sep 2023 05:35    TPro  6.3  /  Alb  2.7  /  TBili  0.5  /  DBili  x   /  AST  25  /  ALT  20  /  AlkPhos  105  08-31      PT/INR - ( 31 Aug 2023 09:28 )   PT: 10.5 sec;   INR: 0.89 ratio      CARDIAC MARKERS ( 31 Aug 2023 12:20 )  x     / 14.7 ng/L / x     / x     / x      CARDIAC MARKERS ( 31 Aug 2023 09:28 )  x     / 10.5 ng/L / x     / x     / x      Urinalysis Basic - ( 01 Sep 2023 05:35 )    Color: x / Appearance: x / SG: x / pH: x  Gluc: 198 mg/dL / Ketone: x  / Bili: x / Urobili: x   Blood: x / Protein: x / Nitrite: x   Leuk Esterase: x / RBC: x / WBC x   Sq Epi: x / Non Sq Epi: x / Bacteria: x      COVID-19 PCR: NotDetec (08-31-23 @ 09:45)      RADIOLOGY & ADDITIONAL TESTS:    Care Discussed with Consultants/Other Providers:

## 2023-09-01 NOTE — PROGRESS NOTE ADULT - SUBJECTIVE AND OBJECTIVE BOX
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    Traumatic R hip fx      POD #1  repair R hip fx with trochanteric kat    Saturated dressing.  ABLA    Dressing reinforced, Re saturated     Fullness in the R thigh     No AC or antiplatelets as OP.    Coags wnl      hgb 10.6-7      Called Dr Macias   Left VM    D/W ortho house staff.  They will follow up with the surgeon.    Consent obtained from the patients daughter for a unit of packed cells.

## 2023-09-01 NOTE — PROGRESS NOTE ADULT - ASSESSMENT
89yo female w. 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    *Right Intertrochanteric Fracture s/p Fall  -Appreciate Ortho involvement and  recs  -RLE WBAT  -Pain control PRN   - Boewl Regimen PRN    *Acute Blood loss Anemia  2/2 Post-op wound bleeding  -H/H 7.0/22.4  -Transfuse 1 Unit PRBCs, repeat CBC, likely will need additional   -Monitor Site for further bleeding, Ortho following    *CAD s/p PCI/Grade II Diastolic Dysfunction with EF 55-60%  *HTN/HLD/Valvular Disease (Mod-Severe MR, Mod TR)/Mod pHTN  -EKG showed new LBBB compared to previous EKG in 2021, trop negative x1 and patient has no cardiac complaints  -Repeat TTE as above  -Appreciate Cardiology clearance  -Discussed with surgery team, okay to continue ASA  -Continue home meds: Metoprolol, Amlodipine, Statin     *Hypothyroidism  -Continue Levothyroxine    *CKD  -Baseline Cr 0.9-1.1 range, stable  -Avoid nephrotoxic agents    *Urinary Retention  -Continue Tamsulosin  -Monitor for s/s retention, bladder scan as needed     *Hiatal Hernia  -Continue PPI     *Depression  -Continue mirtazapine    *DVT PPx  -Holding chemical prophylaxis pending ortho procedure     GOC/Code Status: DNR/DNI  rescinded for the procedure, will review MOLST/d/w Family and resume as appropriate   Dispo: Pending PT Eval. Clinical course as above.   9.1 Will update Family-Lon, Son [phone] and NEELAM Braswell [phone] regarding pts current status and plan of care.    89yo female w. 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    *Right Intertrochanteric Fracture s/p Fall  -Appreciate Ortho involvement and  recs  -RLE WBAT  -Pain control PRN   -Bowel Regimen PRN    *Acute Blood loss Anemia  2/2 Post-op wound bleeding  -H/H 7.0/22.4  -Transfuse 1 Unit PRBCs, repeat CBC, likely will need additional   -Monitor Site for further bleeding, Ortho following    *CAD s/p PCI/Grade II Diastolic Dysfunction with EF 55-60%  *HTN/HLD/Valvular Disease (Mod-Severe MR, Mod TR)/Mod pHTN  -EKG showed new LBBB compared to previous EKG in 2021, trop negative x1 and patient has no cardiac complaints  -Repeat TTE as above  -Appreciate Cardiology clearance  -Discussed with surgery team, okay to continue ASA  -Continue home meds: Metoprolol, Amlodipine, Statin     *Hypothyroidism  -Continue Levothyroxine    *CKD  -Baseline Cr 0.9-1.1 range, stable  -Avoid nephrotoxic agents    *Urinary Retention  -Continue Tamsulosin  -Monitor for s/s retention, bladder scan as needed     *Hiatal Hernia  -Continue PPI     *Depression  -Continue mirtazapine    *DVT PPx  -Holding chemical prophylaxis pending ortho procedure     GOC/Code Status: DNR/DNI  rescinded for the procedure, will review MOLST/d/w Family and resume as appropriate   Dispo: Pending PT Eval. Clinical course as above.   9.1 Will update Family-Lon, Son [phone] and NEELAM Braswell [phone] regarding pts current status and plan of care.

## 2023-09-01 NOTE — PROGRESS NOTE ADULT - SUBJECTIVE AND OBJECTIVE BOX
Patient is a 89y old  Female who presents with a chief complaint of Status Post Fall with Right Hip Pain (01 Sep 2023 06:59)    Patient seen and examined at bedside.  S: Reports pain controlled. Denies other complaints.     ALLERGIES:  hydrochlorothiazide (Anaphylaxis; Angioedema)  cefuroxime (Dystonic RXN)  levofloxacin (Flushing)  sulfamethoxazole (Unknown)    MEDICATIONS:  acetaminophen     Tablet .. 650 milliGRAM(s) Oral every 6 hours PRN  aluminum hydroxide/magnesium hydroxide/simethicone Suspension 30 milliLiter(s) Oral every 4 hours PRN  amLODIPine   Tablet 5 milliGRAM(s) Oral daily  aspirin enteric coated 81 milliGRAM(s) Oral daily  atorvastatin 40 milliGRAM(s) Oral at bedtime  levothyroxine 75 MICROGram(s) Oral daily  melatonin 3 milliGRAM(s) Oral at bedtime PRN  metoprolol tartrate 25 milliGRAM(s) Oral every 12 hours  mirtazapine 7.5 milliGRAM(s) Oral at bedtime  morphine  - Injectable 2 milliGRAM(s) IV Push every 4 hours PRN  ondansetron Injectable 4 milliGRAM(s) IV Push every 8 hours PRN  pantoprazole    Tablet 40 milliGRAM(s) Oral before breakfast  sodium chloride 0.9%. 1000 milliLiter(s) IV Continuous <Continuous>  tamsulosin 0.4 milliGRAM(s) Oral at bedtime    Vital Signs Last 24 Hrs  T(F): 98.3 (01 Sep 2023 10:50), Max: 98.3 (01 Sep 2023 10:50)  HR: 98 (01 Sep 2023 10:50) (70 - 105)  BP: 124/66 (01 Sep 2023 10:50) (81/48 - 124/66)  RR: 19 (01 Sep 2023 10:50) (13 - 20)  SpO2: 96% (01 Sep 2023 10:50) (94% - 100%)  I&O's Summary    31 Aug 2023 07:01  -  01 Sep 2023 07:00  --------------------------------------------------------  IN: 1600 mL / OUT: 0 mL / NET: 1600 mL    PHYSICAL EXAM:  General: NAD, A/O x 2- not to time  ENT: MMM  Lungs: Clear to auscultation bilaterally (Anteriorly)   Cardio: RR, S1/S2, +murmur  Abdomen: Soft, NT/ND, Normal active Bowel Sounds   Extremities: No cyanosis, RLE Soft, R Hip dsg + bld tinged. SILT, +DP pulse.     LABS:                        7.0    11.90 )-----------( 150      ( 01 Sep 2023 05:35 )             22.4     09-01    142  |  109  |  25  ----------------------------<  198  4.2   |  20  |  1.10    Ca    8.3      01 Sep 2023 05:35    TPro  6.3  /  Alb  2.7  /  TBili  0.5  /  DBili  x   /  AST  25  /  ALT  20  /  AlkPhos  105  08-31      PT/INR - ( 31 Aug 2023 09:28 )   PT: 10.5 sec;   INR: 0.89 ratio        CARDIAC MARKERS ( 31 Aug 2023 12:20 )  x     / 14.7 ng/L / x     / x     / x      CARDIAC MARKERS ( 31 Aug 2023 09:28 )  x     / 10.5 ng/L / x     / x     / x          Urinalysis Basic - ( 01 Sep 2023 05:35 )  Color: x / Appearance: x / SG: x / pH: x  Gluc: 198 mg/dL / Ketone: x  / Bili: x / Urobili: x   Blood: x / Protein: x / Nitrite: x   Leuk Esterase: x / RBC: x / WBC x   Sq Epi: x / Non Sq Epi: x / Bacteria: x      COVID-19 PCR: NotDetec (08-31-23 @ 09:45)      RADIOLOGY & ADDITIONAL TESTS:  < from: Xray Chest 1 View- PORTABLE-Urgent (08.31.23 @ 09:19) >  Portable chest: Exam is compared to October 11, 2021. Hiatalhernia again   noted with some increased density behind the heart which could represent   some atelectatic change. Right chest is clear. Anchors related to the   right humeral head as before. Degenerative changes both shoulders. ASCVD.   Heart is at the upper limits of normal in its transthoracic diameter.    AP pelvis. Intratrochanteric fracture of the right proximal femur with   angulation at the fracture site. There is deformity of the right superior   and inferior pubic rami compatible with fractures of indeterminate age is   seen on previous CT August 2019.. Tip is unremarkable. Regional vascular   calcifications noted. Follow-up suggested.    Right femur: Intertrochanteric fracture with angulation at the fracture   site noted with avulsed lesser trochanteric fragment noted. Osteopenia.   Old fractures related to the right superior and inferior pubic rami as   discussed above. Follow-up suggested as indicated clinically.      < from: TTE Echo Complete w/o Contrast w/ Doppler (08.31.23 @ 12:57) >   1. Left ventricular ejection fraction, by visual estimation, is 50 to   55%.   2. Normal global left ventricular systolic function.   3. Severely enlarged left atrium.   4. Spectral Doppler shows impaired relaxation pattern of left   ventricular myocardial filling (Grade I diastolic dysfunction).   5.There is mild septal left ventricular hypertrophy.   6. Mild mitral valve regurgitation.   7. Thickening and calcification of the anterior and posterior mitral   valve leaflets.   8. Mild-moderate tricuspid regurgitation.   9. Mild aortic valve stenosis.      Care Discussed with Consultants/Other Providers:   ALICE Vora discussed case and plan with Dr. Mandujano Patient is a 89y old  Female who presents with a chief complaint of Status Post Fall with Right Hip Pain (01 Sep 2023 06:59)    Patient seen and examined at bedside.  S: Reports pain controlled. Denies other complaints.     ALLERGIES:  hydrochlorothiazide (Anaphylaxis; Angioedema)  cefuroxime (Dystonic RXN)  levofloxacin (Flushing)  sulfamethoxazole (Unknown)    MEDICATIONS:  acetaminophen     Tablet .. 650 milliGRAM(s) Oral every 6 hours PRN  aluminum hydroxide/magnesium hydroxide/simethicone Suspension 30 milliLiter(s) Oral every 4 hours PRN  amLODIPine   Tablet 5 milliGRAM(s) Oral daily  aspirin enteric coated 81 milliGRAM(s) Oral daily  atorvastatin 40 milliGRAM(s) Oral at bedtime  levothyroxine 75 MICROGram(s) Oral daily  melatonin 3 milliGRAM(s) Oral at bedtime PRN  metoprolol tartrate 25 milliGRAM(s) Oral every 12 hours  mirtazapine 7.5 milliGRAM(s) Oral at bedtime  morphine  - Injectable 2 milliGRAM(s) IV Push every 4 hours PRN  ondansetron Injectable 4 milliGRAM(s) IV Push every 8 hours PRN  pantoprazole    Tablet 40 milliGRAM(s) Oral before breakfast  sodium chloride 0.9%. 1000 milliLiter(s) IV Continuous <Continuous>  tamsulosin 0.4 milliGRAM(s) Oral at bedtime    Vital Signs Last 24 Hrs  T(F): 98.3 (01 Sep 2023 10:50), Max: 98.3 (01 Sep 2023 10:50)  HR: 98 (01 Sep 2023 10:50) (70 - 105)  BP: 124/66 (01 Sep 2023 10:50) (81/48 - 124/66)  RR: 19 (01 Sep 2023 10:50) (13 - 20)  SpO2: 96% (01 Sep 2023 10:50) (94% - 100%)    I&O's Summary    31 Aug 2023 07:01  -  01 Sep 2023 07:00  --------------------------------------------------------  IN: 1600 mL / OUT: 0 mL / NET: 1600 mL    PHYSICAL EXAM:  General: NAD, A/O x 2- not to time  ENT: MMM  Lungs: Clear to auscultation bilaterally (Anteriorly)   Cardio: RR, S1/S2, +murmur  Abdomen: Soft, NT/ND, Normal active Bowel Sounds   Extremities: No cyanosis, RLE Soft, R Hip dsg + bld tinged. SILT, +DP pulse.     LABS:                        7.0    11.90 )-----------( 150      ( 01 Sep 2023 05:35 )             22.4     09-01    142  |  109  |  25  ----------------------------<  198  4.2   |  20  |  1.10    Ca    8.3      01 Sep 2023 05:35    TPro  6.3  /  Alb  2.7  /  TBili  0.5  /  DBili  x   /  AST  25  /  ALT  20  /  AlkPhos  105  08-31      PT/INR - ( 31 Aug 2023 09:28 )   PT: 10.5 sec;   INR: 0.89 ratio        CARDIAC MARKERS ( 31 Aug 2023 12:20 )  x     / 14.7 ng/L / x     / x     / x      CARDIAC MARKERS ( 31 Aug 2023 09:28 )  x     / 10.5 ng/L / x     / x     / x          Urinalysis Basic - ( 01 Sep 2023 05:35 )  Color: x / Appearance: x / SG: x / pH: x  Gluc: 198 mg/dL / Ketone: x  / Bili: x / Urobili: x   Blood: x / Protein: x / Nitrite: x   Leuk Esterase: x / RBC: x / WBC x   Sq Epi: x / Non Sq Epi: x / Bacteria: x      COVID-19 PCR: NotDetec (08-31-23 @ 09:45)      RADIOLOGY & ADDITIONAL TESTS:  < from: Xray Chest 1 View- PORTABLE-Urgent (08.31.23 @ 09:19) >  Portable chest: Exam is compared to October 11, 2021. Hiatalhernia again   noted with some increased density behind the heart which could represent   some atelectatic change. Right chest is clear. Anchors related to the   right humeral head as before. Degenerative changes both shoulders. ASCVD.   Heart is at the upper limits of normal in its transthoracic diameter.    AP pelvis. Intratrochanteric fracture of the right proximal femur with   angulation at the fracture site. There is deformity of the right superior   and inferior pubic rami compatible with fractures of indeterminate age is   seen on previous CT August 2019.. Tip is unremarkable. Regional vascular   calcifications noted. Follow-up suggested.    Right femur: Intertrochanteric fracture with angulation at the fracture   site noted with avulsed lesser trochanteric fragment noted. Osteopenia.   Old fractures related to the right superior and inferior pubic rami as   discussed above. Follow-up suggested as indicated clinically.      < from: TTE Echo Complete w/o Contrast w/ Doppler (08.31.23 @ 12:57) >   1. Left ventricular ejection fraction, by visual estimation, is 50 to   55%.   2. Normal global left ventricular systolic function.   3. Severely enlarged left atrium.   4. Spectral Doppler shows impaired relaxation pattern of left   ventricular myocardial filling (Grade I diastolic dysfunction).   5.There is mild septal left ventricular hypertrophy.   6. Mild mitral valve regurgitation.   7. Thickening and calcification of the anterior and posterior mitral   valve leaflets.   8. Mild-moderate tricuspid regurgitation.   9. Mild aortic valve stenosis.      Care Discussed with Consultants/Other Providers:   ALICE Vora discussed case and plan with Dr. Mandujano

## 2023-09-01 NOTE — PROGRESS NOTE ADULT - SUBJECTIVE AND OBJECTIVE BOX
F/U Note:    89y Female admitted POD # 1 from ORIF, hip, using trochanteric kat        Vital Signs Last 24 Hrs  T(C): 36.8 (01 Sep 2023 18:40), Max: 36.9 (01 Sep 2023 18:20)  T(F): 98.3 (01 Sep 2023 18:40), Max: 98.5 (01 Sep 2023 18:20)  HR: 109 (01 Sep 2023 18:40) (70 - 110)  BP: 137/76 (01 Sep 2023 18:40) (81/48 - 137/76)  BP(mean): 59 (31 Aug 2023 22:50) (59 - 59)  RR: 18 (01 Sep 2023 18:40) (13 - 20)  SpO2: 94% (01 Sep 2023 18:40) (94% - 100%)    Parameters below as of 01 Sep 2023 18:40  Patient On (Oxygen Delivery Method): room air                                8.3    11.72 )-----------( 131      ( 01 Sep 2023 15:18 )             25.3         09-01    142  |  109<H>  |  25<H>  ----------------------------<  198<H>  4.2   |  20<L>  |  1.10    Ca    8.3<L>      01 Sep 2023 05:35    TPro  6.3  /  Alb  2.7<L>  /  TBili  0.5  /  DBili  x   /  AST  25  /  ALT  20  /  AlkPhos  105  08-31        NEURO: no headaches, blurry vision, tremors, depression, anxity  CV: no chest pain, palpitations, murmurs, orthopnea  Resp: no shortness of breath, cough, wheeze, sputum production  GI: no stomach pain,nausea, vomitting, flatulence, hematemesis, hematochezia  PV: no swelling of extremities, no hair loss, no coolness to extremities  ENDO: no polydypsia, polyphagia, polyuria, weight loss, night sweats          NEURO: awake and alert  CV: (+) S1/S2, rrr, no mrg  RESP: CTA b/l  GI: soft, non tender             F/U Note:    89y Female admitted POD # 1 from ORIF, hip, using trochanteric kat    -called to bedside by RN as right lower thigh incision noted to have bleeding, with saturated dressing/gauze  - I called and spoke with surgical PA (Alondra caldwell) who spoke with attnding surgeon (Dr. Sorensen)   -this is not un-expected bleeding given her operation,   for now will hold aspirin and apply pressure dressing, no current indication for return to OR, surgical osvaldo will evalaute and re-dress in the morning         Vital Signs Last 24 Hrs  T(C): 36.8 (01 Sep 2023 18:40), Max: 36.9 (01 Sep 2023 18:20)  T(F): 98.3 (01 Sep 2023 18:40), Max: 98.5 (01 Sep 2023 18:20)  HR: 109 (01 Sep 2023 18:40) (70 - 110)  BP: 137/76 (01 Sep 2023 18:40) (81/48 - 137/76)  BP(mean): 59 (31 Aug 2023 22:50) (59 - 59)  RR: 18 (01 Sep 2023 18:40) (13 - 20)  SpO2: 94% (01 Sep 2023 18:40) (94% - 100%)    Parameters below as of 01 Sep 2023 18:40  Patient On (Oxygen Delivery Method): room air                                8.3    11.72 )-----------( 131      ( 01 Sep 2023 15:18 )             25.3         09-01    142  |  109<H>  |  25<H>  ----------------------------<  198<H>  4.2   |  20<L>  |  1.10    Ca    8.3<L>      01 Sep 2023 05:35    TPro  6.3  /  Alb  2.7<L>  /  TBili  0.5  /  DBili  x   /  AST  25  /  ALT  20  /  AlkPhos  105  08-31        NEURO: no headaches, blurry vision, tremors, depression, anxity  CV: no chest pain, palpitations, murmurs, orthopnea  Resp: no shortness of breath, cough, wheeze, sputum production  GI: no stomach pain,nausea, vomitting, flatulence, hematemesis, hematochezia  PV: no swelling of extremities, no hair loss, no coolness to extremities  ENDO: no polydypsia, polyphagia, polyuria, weight loss, night sweats          NEURO: awake and alert  CV: (+) S1/S2, rrr, no mrg  RESP: CTA b/l  GI: soft, non tender

## 2023-09-01 NOTE — PROGRESS NOTE ADULT - ASSESSMENT
89 year old female with hx of CAD s/p PCI (2008)  , CKD, is stable post ORIF right hip POD #1.      --   anemia of acute blood loss       Plan: Discussed with Dr Crowell           PT OOB WBAT right leg           ASA 81 mg bid for DVT prophylaxis           check labs in am

## 2023-09-02 LAB
ANION GAP SERPL CALC-SCNC: 9 MMOL/L — SIGNIFICANT CHANGE UP (ref 5–17)
BASOPHILS # BLD AUTO: 0.05 K/UL — SIGNIFICANT CHANGE UP (ref 0–0.2)
BASOPHILS NFR BLD AUTO: 0.3 % — SIGNIFICANT CHANGE UP (ref 0–2)
BUN SERPL-MCNC: 21 MG/DL — SIGNIFICANT CHANGE UP (ref 7–23)
CALCIUM SERPL-MCNC: 8.5 MG/DL — SIGNIFICANT CHANGE UP (ref 8.4–10.5)
CHLORIDE SERPL-SCNC: 109 MMOL/L — HIGH (ref 96–108)
CO2 SERPL-SCNC: 22 MMOL/L — SIGNIFICANT CHANGE UP (ref 22–31)
CREAT SERPL-MCNC: 0.83 MG/DL — SIGNIFICANT CHANGE UP (ref 0.5–1.3)
EGFR: 65 ML/MIN/1.73M2 — SIGNIFICANT CHANGE UP
EOSINOPHIL # BLD AUTO: 0.11 K/UL — SIGNIFICANT CHANGE UP (ref 0–0.5)
EOSINOPHIL NFR BLD AUTO: 0.8 % — SIGNIFICANT CHANGE UP (ref 0–6)
GLUCOSE SERPL-MCNC: 116 MG/DL — HIGH (ref 70–99)
HCT VFR BLD CALC: 30.7 % — LOW (ref 34.5–45)
HGB BLD-MCNC: 10.4 G/DL — LOW (ref 11.5–15.5)
IMM GRANULOCYTES NFR BLD AUTO: 0.9 % — SIGNIFICANT CHANGE UP (ref 0–0.9)
LYMPHOCYTES # BLD AUTO: 1.94 K/UL — SIGNIFICANT CHANGE UP (ref 1–3.3)
LYMPHOCYTES # BLD AUTO: 13.4 % — SIGNIFICANT CHANGE UP (ref 13–44)
MCHC RBC-ENTMCNC: 30.3 PG — SIGNIFICANT CHANGE UP (ref 27–34)
MCHC RBC-ENTMCNC: 33.9 GM/DL — SIGNIFICANT CHANGE UP (ref 32–36)
MCV RBC AUTO: 89.5 FL — SIGNIFICANT CHANGE UP (ref 80–100)
MONOCYTES # BLD AUTO: 1.02 K/UL — HIGH (ref 0–0.9)
MONOCYTES NFR BLD AUTO: 7.1 % — SIGNIFICANT CHANGE UP (ref 2–14)
NEUTROPHILS # BLD AUTO: 11.18 K/UL — HIGH (ref 1.8–7.4)
NEUTROPHILS NFR BLD AUTO: 77.5 % — HIGH (ref 43–77)
NRBC # BLD: 0 /100 WBCS — SIGNIFICANT CHANGE UP (ref 0–0)
PLATELET # BLD AUTO: 133 K/UL — LOW (ref 150–400)
POTASSIUM SERPL-MCNC: 4.2 MMOL/L — SIGNIFICANT CHANGE UP (ref 3.5–5.3)
POTASSIUM SERPL-SCNC: 4.2 MMOL/L — SIGNIFICANT CHANGE UP (ref 3.5–5.3)
RBC # BLD: 3.43 M/UL — LOW (ref 3.8–5.2)
RBC # FLD: 15 % — HIGH (ref 10.3–14.5)
SODIUM SERPL-SCNC: 140 MMOL/L — SIGNIFICANT CHANGE UP (ref 135–145)
WBC # BLD: 14.43 K/UL — HIGH (ref 3.8–10.5)
WBC # FLD AUTO: 14.43 K/UL — HIGH (ref 3.8–10.5)

## 2023-09-02 PROCEDURE — 99232 SBSQ HOSP IP/OBS MODERATE 35: CPT | Mod: FS

## 2023-09-02 RX ORDER — OLANZAPINE 15 MG/1
2.5 TABLET, FILM COATED ORAL ONCE
Refills: 0 | Status: COMPLETED | OUTPATIENT
Start: 2023-09-02 | End: 2023-09-02

## 2023-09-02 RX ADMIN — TAMSULOSIN HYDROCHLORIDE 0.4 MILLIGRAM(S): 0.4 CAPSULE ORAL at 21:06

## 2023-09-02 RX ADMIN — Medication 650 MILLIGRAM(S): at 12:22

## 2023-09-02 RX ADMIN — MIRTAZAPINE 7.5 MILLIGRAM(S): 45 TABLET, ORALLY DISINTEGRATING ORAL at 21:06

## 2023-09-02 RX ADMIN — Medication 3 MILLIGRAM(S): at 21:06

## 2023-09-02 RX ADMIN — Medication 75 MICROGRAM(S): at 05:55

## 2023-09-02 RX ADMIN — Medication 81 MILLIGRAM(S): at 17:27

## 2023-09-02 RX ADMIN — Medication 650 MILLIGRAM(S): at 13:22

## 2023-09-02 RX ADMIN — Medication 25 MILLIGRAM(S): at 05:55

## 2023-09-02 RX ADMIN — PANTOPRAZOLE SODIUM 40 MILLIGRAM(S): 20 TABLET, DELAYED RELEASE ORAL at 05:55

## 2023-09-02 RX ADMIN — ATORVASTATIN CALCIUM 40 MILLIGRAM(S): 80 TABLET, FILM COATED ORAL at 21:05

## 2023-09-02 RX ADMIN — OLANZAPINE 2.5 MILLIGRAM(S): 15 TABLET, FILM COATED ORAL at 02:35

## 2023-09-02 RX ADMIN — Medication 25 MILLIGRAM(S): at 17:27

## 2023-09-02 RX ADMIN — AMLODIPINE BESYLATE 5 MILLIGRAM(S): 2.5 TABLET ORAL at 05:56

## 2023-09-02 NOTE — PHYSICAL THERAPY INITIAL EVALUATION ADULT - RANGE OF MOTION EXAMINATION, REHAB EVAL
RLE hip & knee limited due to pain/bilateral upper extremity ROM was WFL (within functional limits)/bilateral lower extremity ROM was WFL (within functional limits)
bilateral upper extremity ROM was WFL (within functional limits)/bilateral lower extremity ROM was WFL (within functional limits)

## 2023-09-02 NOTE — PHYSICAL THERAPY INITIAL EVALUATION ADULT - ADDITIONAL COMMENTS
Per daughter, pt was independent with all mobility ambulating with cane outside the home, inside the home did not use device. Pt performs ADLs independently, uses shower chair. Pt owns rolling walker, cane, shower chair, and wheelchair. Pt's daughter drives her to grocery store/doctor appointments.,
lives alone in apartment stairs into. pt used SAC prior to admission and "furniture" as per family.  pt was independent with all ADL's

## 2023-09-02 NOTE — CHART NOTE - NSCHARTNOTEFT_GEN_A_CORE
F/U Note:    Patient is a 89y old  Female who presents with a chief complaint of Status Post Fall with Right Hip Pain     Interval Hx;    Notified by RN that patient appeared more agitated and tried getting out of bed. Given 2.5mg of zyprexa.      Also notified by nurse that patient seems more confused. Per previous notes patient is A&Ox2 waxing and waning. Sent UA to r/o an source of infection.     Vital Signs Last 24 Hrs  T(C): 36.8 (01 Sep 2023 21:45), Max: 36.9 (01 Sep 2023 18:20)  T(F): 98.2 (01 Sep 2023 21:45), Max: 98.5 (01 Sep 2023 18:20)  HR: 96 (01 Sep 2023 21:45) (96 - 110)  BP: 120/69 (01 Sep 2023 21:45) (103/57 - 137/76)  BP(mean): --  RR: 22 (01 Sep 2023 21:45) (18 - 22)  SpO2: 94% (01 Sep 2023 18:40) (94% - 98%)    Parameters below as of 01 Sep 2023 21:45  Patient On (Oxygen Delivery Method): room air                                8.3    11.72 )-----------( 131      ( 01 Sep 2023 15:18 )             25.3         09-01    142  |  109<H>  |  25<H>  ----------------------------<  198<H>  4.2   |  20<L>  |  1.10    Ca    8.3<L>      01 Sep 2023 05:35    TPro  6.3  /  Alb  2.7<L>  /  TBili  0.5  /  DBili  x   /  AST  25  /  ALT  20  /  AlkPhos  105  08-31      ROS: negative  Physical Exam:  T(C): 36.8 (09-01-23 @ 21:45), Max: 36.9 (09-01-23 @ 18:20)  HR: 96 (09-01-23 @ 21:45) (96 - 110)  BP: 120/69 (09-01-23 @ 21:45) (103/57 - 137/76)  RR: 22 (09-01-23 @ 21:45) (18 - 22)  SpO2: 94% (09-01-23 @ 18:40) (94% - 98%)    CONSTITUTIONAL: Well groomed, no apparent distress  EYES: PERRLA and symmetric, EOMI, No conjunctival or scleral injection, non-icteric  RESP: No respiratory distress, no use of accessory muscles; CTA b/l, no WRR  CV: RRR, +S1S2  GI: Soft, NT, ND, no rebound, no guarding  PSYCH: A&Ox1/2

## 2023-09-02 NOTE — PHYSICAL THERAPY INITIAL EVALUATION ADULT - GENERAL OBSERVATIONS, REHAB EVAL
Pt is s/p R hip ORIF using trochanteric kat on 8/31, WBAT RLE per ortho, pt received 1 unit PRBC this morning. Pt recieved semi-supine in bed in NAD, +tachy, +IV, A&Ox3, daughter at bedside, social hx obtained, pt requesting to use bedpan.
pt received supine in bed awake confused +tele +IV +prima fit

## 2023-09-02 NOTE — PHYSICAL THERAPY INITIAL EVALUATION ADULT - MANUAL MUSCLE TESTING RESULTS, REHAB EVAL
at least 3+/5 in Bilat UE and left LE 2+/5 iright LE
UE grossly 3/5, LLE grossly 3/5, RLE hip & knee 2/5, ankle 3/5/grossly assessed due to

## 2023-09-02 NOTE — PHYSICAL THERAPY INITIAL EVALUATION ADULT - PERTINENT HX OF CURRENT PROBLEM, REHAB EVAL
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
89yo female w. 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    *Right Intertrochanteric Fracture s/p Fall  *Leukocytosis- possible reactive - no fevers noted   -Ortho following and   recs  -RLE WBAT  -Pain control PRN   - Boewl Regimen PRN  -asa 81 mg for DVT proph

## 2023-09-02 NOTE — PROGRESS NOTE ADULT - ASSESSMENT
89yo female w. 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    *Right Intertrochanteric Fracture s/p Fall  -Appreciate Ortho involvement and  recs  -RLE WBAT  -Pain control PRN   - Boewl Regimen PRN    *Acute Blood loss Anemia  2/2 Post-op wound bleeding  -H/H 7.0/22.4  -Transfuse 1 Unit PRBCs, repeat CBC, likely will need additional   -Monitor Site for further bleeding, Ortho following    *CAD s/p PCI/Grade II Diastolic Dysfunction with EF 55-60%  *HTN/HLD/Valvular Disease (Mod-Severe MR, Mod TR)/Mod pHTN  -EKG showed new LBBB compared to previous EKG in 2021, trop negative x1 and patient has no cardiac complaints  -Repeat TTE as above  -Appreciate Cardiology clearance  -Discussed with surgery team, okay to continue ASA  -Continue home meds: Metoprolol, Amlodipine, Statin     *Hypothyroidism  -Continue Levothyroxine    *CKD  -Baseline Cr 0.9-1.1 range, stable  -Avoid nephrotoxic agents    *Urinary Retention  -Continue Tamsulosin  -Monitor for s/s retention, bladder scan as needed     *Hiatal Hernia  -Continue PPI     *Depression  -Continue mirtazapine    *DVT PPx  -Holding chemical prophylaxis pending ortho procedure     GOC/Code Status: DNR/DNI  rescinded for the procedure, will review MOLST/d/w Family and resume as appropriate   Dispo: Pending PT Eval. Clinical course as above.   9.1 Will update Family-Lon, Son [phone] and NEELAM Braswell [phone] regarding pts current status and plan of care.    89yo female w. 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    *Right Intertrochanteric Fracture s/p Fall  *Leukocytosis- possible reactive - no fevers noted   -Ortho following and   recs  -RLE WBAT  -Pain control PRN   - Boewl Regimen PRN  -asa 81 mg for DVT proph    *Acute Blood loss Anemia  2/2 Post-op wound bleeding  -H/H 10/30 s/p 2 units PRBC 9/1   -dressing clean and dry no  further bleeding at this time , Ortho following    *CAD s/p PCI/Grade II Diastolic Dysfunction with EF 55-60%  *HTN/HLD/Valvular Disease (Mod-Severe MR, Mod TR)/Mod pHTN  -EKG showed new LBBB compared to previous EKG in 2021, trop negative x1 and patient has no cardiac complaints  -Repeat TTE as above  -Appreciate Cardiology clearance  -Discussed with surgery team, okay to continue ASA  -Continue home meds: Metoprolol, Amlodipine, Statin     *Hypothyroidism  -Continue Levothyroxine    *CKD  -Baseline Cr 0.9-1.1 range, stable  -Avoid nephrotoxic agents    *Urinary Retention  -Continue Tamsulosin  -Monitor for s/s retention, bladder scan as needed     *Hiatal Hernia  -Continue PPI     *Depression  *Dementia  increased aggitation this am  -1-1 observation   -Continue mirtazapine    *DVT PPx  -asa 81mg for DVT proph     GOC/Code Status: DNR/DNI  review MOLST/d/w Family  Dispo: Pending PT Eval. Clinical course as above.   9.2 Will update Family-Lon, Son [phone] and NEELAM Braswlel [phone] regarding pts current status and plan of care.    89yo female w. 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    *Right Intertrochanteric Fracture s/p Fall  *Leukocytosis- possible reactive - no fevers noted   -Ortho following and   recs  -RLE WBAT  -Pain control PRN   - Boewl Regimen PRN  -asa 81 mg for DVT proph    *Acute Blood loss Anemia  2/2 Post-op wound bleeding  -H/H 10/30 s/p 2 units PRBC 9/1   -dressing clean and dry no  further bleeding at this time , Ortho following    *CAD s/p PCI/Grade II Diastolic Dysfunction with EF 55-60%  *HTN/HLD/Valvular Disease (Mod-Severe MR, Mod TR)/Mod pHTN  -EKG showed new LBBB compared to previous EKG in 2021, trop negative x1 and patient has no cardiac complaints  -Repeat TTE as above  -Appreciate Cardiology clearance  -Discussed with surgery team, okay to continue ASA  -Continue home meds: Metoprolol, Amlodipine, Statin     *Hypothyroidism  -Continue Levothyroxine    *CKD  -Baseline Cr 0.9-1.1 range, stable  -Avoid nephrotoxic agents    *Urinary Retention  -Continue Tamsulosin  -Monitor for s/s retention, bladder scan as needed     *Hiatal Hernia  -Continue PPI     *Depression  *Dementia  increased aggitation this am  -1-1 observation   -Continue mirtazapine    *DVT PPx  -asa 81mg for DVT proph     GOC/Code Status: DNR/DNI  review MOLST/d/w Family  Dispo: Pending PT Eval. Clinical course as above.   9.2  update daughter at bedside- discussed with her that mom was most likely going to be going to rehab ctr family deciding on location - Family-Lon, Son [phone] and NEELAM Braswell [phone] - CM notified

## 2023-09-02 NOTE — PROGRESS NOTE ADULT - SUBJECTIVE AND OBJECTIVE BOX
Patient is a 89y old  Female who presents with a chief complaint of Status Post Fall with Right Hip Pain (01 Sep 2023 19:33)      Patient seen and examined at bedside.    ALLERGIES:  hydrochlorothiazide (Anaphylaxis; Angioedema)  cefuroxime (Dystonic RXN)  levofloxacin (Flushing)  sulfamethoxazole (Unknown)    MEDICATIONS  (STANDING):  amLODIPine   Tablet 5 milliGRAM(s) Oral daily  aspirin  chewable 81 milliGRAM(s) Oral two times a day  atorvastatin 40 milliGRAM(s) Oral at bedtime  levothyroxine 75 MICROGram(s) Oral daily  metoprolol tartrate 25 milliGRAM(s) Oral every 12 hours  mirtazapine 7.5 milliGRAM(s) Oral at bedtime  pantoprazole    Tablet 40 milliGRAM(s) Oral before breakfast  sodium chloride 0.9%. 1000 milliLiter(s) (75 mL/Hr) IV Continuous <Continuous>  tamsulosin 0.4 milliGRAM(s) Oral at bedtime    MEDICATIONS  (PRN):  acetaminophen     Tablet .. 650 milliGRAM(s) Oral every 6 hours PRN Temp greater or equal to 38C (100.4F), Mild Pain (1 - 3)  aluminum hydroxide/magnesium hydroxide/simethicone Suspension 30 milliLiter(s) Oral every 4 hours PRN Dyspepsia  melatonin 3 milliGRAM(s) Oral at bedtime PRN Insomnia  morphine  - Injectable 2 milliGRAM(s) IV Push every 4 hours PRN Moderate Pain (4 - 6)  ondansetron Injectable 4 milliGRAM(s) IV Push every 8 hours PRN Nausea and/or Vomiting    Vital Signs Last 24 Hrs  T(F): 97.7 (02 Sep 2023 07:08), Max: 98.5 (01 Sep 2023 18:20)  HR: 96 (01 Sep 2023 21:45) (96 - 110)  BP: 127/78 (02 Sep 2023 07:08) (103/57 - 137/76)  RR: 20 (02 Sep 2023 07:08) (18 - 22)  SpO2: 97% (02 Sep 2023 07:08) (94% - 98%)  I&O's Summary    01 Sep 2023 07:01  -  02 Sep 2023 07:00  --------------------------------------------------------  IN: 2508 mL / OUT: 430 mL / NET: 2078 mL      PHYSICAL EXAM:  General: NAD, A/O x 3  ENT: MMM  Neck: Supple, No JVD  Lungs: Clear to auscultation bilaterally, Non labored breathing   Cardio: RRR, S1/S2, No murmurs  Abdomen: Soft, Nontender, Nondistended; Bowel sounds present  Extremities: No calf tenderness, No pitting edema    LABS:                        8.3    11.72 )-----------( 131      ( 01 Sep 2023 15:18 )             25.3     09-01    142  |  109  |  25  ----------------------------<  198  4.2   |  20  |  1.10    Ca    8.3      01 Sep 2023 05:35    TPro  6.3  /  Alb  2.7  /  TBili  0.5  /  DBili  x   /  AST  25  /  ALT  20  /  AlkPhos  105  08-31      PT/INR - ( 31 Aug 2023 09:28 )   PT: 10.5 sec;   INR: 0.89 ratio             CARDIAC MARKERS ( 31 Aug 2023 12:20 )  x     / 14.7 ng/L / x     / x     / x      CARDIAC MARKERS ( 31 Aug 2023 09:28 )  x     / 10.5 ng/L / x     / x     / x                            Urinalysis Basic - ( 01 Sep 2023 05:35 )    Color: x / Appearance: x / SG: x / pH: x  Gluc: 198 mg/dL / Ketone: x  / Bili: x / Urobili: x   Blood: x / Protein: x / Nitrite: x   Leuk Esterase: x / RBC: x / WBC x   Sq Epi: x / Non Sq Epi: x / Bacteria: x        COVID-19 PCR: [facility]at[facility]o (08-31-23 @ 09:45)    RADIOLOGY & ADDITIONAL TESTS:    Care Discussed with Consultants/Other Providers:    Patient is a 89y old  Female who presents with a chief complaint of Status Post Fall with Right Hip Pain (01 Sep 2023 19:33)      Patient seen and examined at bedside.  Pt confused and aggitated this am, pt wants to go home, unaware of where she is and why she is here.    ALLERGIES:  hydrochlorothiazide (Anaphylaxis; Angioedema)  cefuroxime (Dystonic RXN)  levofloxacin (Flushing)  sulfamethoxazole (Unknown)    MEDICATIONS  (STANDING):  amLODIPine   Tablet 5 milliGRAM(s) Oral daily  aspirin  chewable 81 milliGRAM(s) Oral two times a day  atorvastatin 40 milliGRAM(s) Oral at bedtime  levothyroxine 75 MICROGram(s) Oral daily  metoprolol tartrate 25 milliGRAM(s) Oral every 12 hours  mirtazapine 7.5 milliGRAM(s) Oral at bedtime  pantoprazole    Tablet 40 milliGRAM(s) Oral before breakfast  sodium chloride 0.9%. 1000 milliLiter(s) (75 mL/Hr) IV Continuous <Continuous>  tamsulosin 0.4 milliGRAM(s) Oral at bedtime    MEDICATIONS  (PRN):  acetaminophen     Tablet .. 650 milliGRAM(s) Oral every 6 hours PRN Temp greater or equal to 38C (100.4F), Mild Pain (1 - 3)  aluminum hydroxide/magnesium hydroxide/simethicone Suspension 30 milliLiter(s) Oral every 4 hours PRN Dyspepsia  melatonin 3 milliGRAM(s) Oral at bedtime PRN Insomnia  morphine  - Injectable 2 milliGRAM(s) IV Push every 4 hours PRN Moderate Pain (4 - 6)  ondansetron Injectable 4 milliGRAM(s) IV Push every 8 hours PRN Nausea and/or Vomiting    Vital Signs Last 24 Hrs  T(F): 97.7 (02 Sep 2023 07:08), Max: 98.5 (01 Sep 2023 18:20)  HR: 96 (01 Sep 2023 21:45) (96 - 110)  BP: 127/78 (02 Sep 2023 07:08) (103/57 - 137/76)  RR: 20 (02 Sep 2023 07:08) (18 - 22)  SpO2: 97% (02 Sep 2023 07:08) (94% - 98%)  I&O's Summary    01 Sep 2023 07:01  -  02 Sep 2023 07:00  --------------------------------------------------------  IN: 2508 mL / OUT: 430 mL / NET: 2078 mL      PHYSICAL EXAM:  General:89 y/o female confused in  NAD, A/O to person   ENT: MMM  Neck: Supple, No JVD  Lungs: Clear to auscultation bilaterally, Non labored breathing   Cardio: RRR, S1/S2, No murmurs  Abdomen: Soft, Nontender, Nondistended; Bowel sounds present  Extremities: No calf tenderness, No pitting edema s/p right orif     LABS:               LABS:  cret                        10.4   14.43 )-----------( 133      ( 02 Sep 2023 07:49 )             30.7     09-02    140  |  109<H>  |  21  ----------------------------<  116<H>  4.2   |  22  |  0.83    Ca    8.5      02 Sep 2023 07:49    TPro  6.3  /  Alb  2.7<L>  /  TBili  0.5  /  DBili  x   /  AST  25  /  ALT  20  /  AlkPhos  105  08-31    PT/INR - ( 31 Aug 2023 09:28 )   PT: 10.5 sec;   INR: 0.89 ratio                                 Urinalysis Basic - ( 01 Sep 2023 05:35 )    Color: x / Appearance: x / SG: x / pH: x  Gluc: 198 mg/dL / Ketone: x  / Bili: x / Urobili: x   Blood: x / Protein: x / Nitrite: x   Leuk Esterase: x / RBC: x / WBC x   Sq Epi: x / Non Sq Epi: x / Bacteria: x        COVID-19 PCR: Manolo (08-31-23 @ 09:45)    RADIOLOGY & ADDITIONAL TESTS:    Care Discussed with Consultants/Other Providers:    Patient is a 89y old  Female who presents with a chief complaint of Status Post Fall with Right Hip Pain (01 Sep 2023 19:33)      Patient seen and examined at bedside.  Pt confused and agitated this am, pt wants to go home, unaware of where she is and why she is here.    ALLERGIES:  hydrochlorothiazide (Anaphylaxis; Angioedema)  cefuroxime (Dystonic RXN)  levofloxacin (Flushing)  sulfamethoxazole (Unknown)    MEDICATIONS  (STANDING):  amLODIPine   Tablet 5 milliGRAM(s) Oral daily  aspirin  chewable 81 milliGRAM(s) Oral two times a day  atorvastatin 40 milliGRAM(s) Oral at bedtime  levothyroxine 75 MICROGram(s) Oral daily  metoprolol tartrate 25 milliGRAM(s) Oral every 12 hours  mirtazapine 7.5 milliGRAM(s) Oral at bedtime  pantoprazole    Tablet 40 milliGRAM(s) Oral before breakfast  sodium chloride 0.9%. 1000 milliLiter(s) (75 mL/Hr) IV Continuous <Continuous>  tamsulosin 0.4 milliGRAM(s) Oral at bedtime    MEDICATIONS  (PRN):  acetaminophen     Tablet .. 650 milliGRAM(s) Oral every 6 hours PRN Temp greater or equal to 38C (100.4F), Mild Pain (1 - 3)  aluminum hydroxide/magnesium hydroxide/simethicone Suspension 30 milliLiter(s) Oral every 4 hours PRN Dyspepsia  melatonin 3 milliGRAM(s) Oral at bedtime PRN Insomnia  morphine  - Injectable 2 milliGRAM(s) IV Push every 4 hours PRN Moderate Pain (4 - 6)  ondansetron Injectable 4 milliGRAM(s) IV Push every 8 hours PRN Nausea and/or Vomiting    Vital Signs Last 24 Hrs  T(F): 97.7 (02 Sep 2023 07:08), Max: 98.5 (01 Sep 2023 18:20)  HR: 96 (01 Sep 2023 21:45) (96 - 110)  BP: 127/78 (02 Sep 2023 07:08) (103/57 - 137/76)  RR: 20 (02 Sep 2023 07:08) (18 - 22)  SpO2: 97% (02 Sep 2023 07:08) (94% - 98%)  I&O's Summary    01 Sep 2023 07:01  -  02 Sep 2023 07:00  --------------------------------------------------------  IN: 2508 mL / OUT: 430 mL / NET: 2078 mL      PHYSICAL EXAM:  General:89 y/o female confused in  NAD, A/O to person   ENT: MMM  Neck: Supple, No JVD  Lungs: Clear to auscultation bilaterally, Non labored breathing   Cardio: RRR, S1/S2, No murmurs  Abdomen: Soft, Nontender, Nondistended; Bowel sounds present  Extremities: No calf tenderness, No pitting edema s/p right orif     LABS:               LABS:  cret                        10.4   14.43 )-----------( 133      ( 02 Sep 2023 07:49 )             30.7     09-02    140  |  109<H>  |  21  ----------------------------<  116<H>  4.2   |  22  |  0.83    Ca    8.5      02 Sep 2023 07:49    TPro  6.3  /  Alb  2.7<L>  /  TBili  0.5  /  DBili  x   /  AST  25  /  ALT  20  /  AlkPhos  105  08-31    PT/INR - ( 31 Aug 2023 09:28 )   PT: 10.5 sec;   INR: 0.89 ratio                                 Urinalysis Basic - ( 01 Sep 2023 05:35 )    Color: x / Appearance: x / SG: x / pH: x  Gluc: 198 mg/dL / Ketone: x  / Bili: x / Urobili: x   Blood: x / Protein: x / Nitrite: x   Leuk Esterase: x / RBC: x / WBC x   Sq Epi: x / Non Sq Epi: x / Bacteria: x        COVID-19 PCR: Manolo (08-31-23 @ 09:45)    RADIOLOGY & ADDITIONAL TESTS:    Care Discussed with Consultants/Other Providers:

## 2023-09-02 NOTE — PROGRESS NOTE ADULT - ASSESSMENT
89 year old female with hx of CADs/p PCI , valvular disease, moderate MR , Mod TR , CKD  is now stable POD #2 ORIF right hip.     -- anemia of acute blood loss  hgb stable s/p 2 units PRBC s  -- leukocytosis  ? reactive     Plan:  Discussed with Dr ROBERT TSANG  WBAT right leg            ASA 81 mg bid for DVT prophylaxis started            check labs in am            pain management with tylenol            rehab planning when medically stable

## 2023-09-02 NOTE — PROGRESS NOTE ADULT - SUBJECTIVE AND OBJECTIVE BOX
Patient is a 89y old  Female who presents with a chief complaint of Status Post Fall with Right Hip Pain is no ORIF Right Hip POD 2    Patient is sitting up in bed many family members by her side.   She is pleasantly confused this am but will repond to questions asked.    Patient appears comfortable  and is not complaining of pain.    Her surgical site is no longer bleeding.   Dressings changed and rewrapped with an ace bandage.  Patient denies CP or SOB.   Her hgb is stable s/p her second unit of PRBC s .       ALLERGIES:  hydrochlorothiazide (Anaphylaxis; Angioedema)  cefuroxime (Dystonic RXN)  levofloxacin (Flushing)  sulfamethoxazole (Unknown)    MEDICATIONS  (STANDING):  amLODIPine   Tablet 5 milliGRAM(s) Oral daily  aspirin  chewable 81 milliGRAM(s) Oral two times a day  atorvastatin 40 milliGRAM(s) Oral at bedtime  levothyroxine 75 MICROGram(s) Oral daily  metoprolol tartrate 25 milliGRAM(s) Oral every 12 hours  mirtazapine 7.5 milliGRAM(s) Oral at bedtime  pantoprazole    Tablet 40 milliGRAM(s) Oral before breakfast  sodium chloride 0.9%. 1000 milliLiter(s) (75 mL/Hr) IV Continuous <Continuous>  tamsulosin 0.4 milliGRAM(s) Oral at bedtime    MEDICATIONS  (PRN):  acetaminophen     Tablet .. 650 milliGRAM(s) Oral every 6 hours PRN Temp greater or equal to 38C (100.4F), Mild Pain (1 - 3)  aluminum hydroxide/magnesium hydroxide/simethicone Suspension 30 milliLiter(s) Oral every 4 hours PRN Dyspepsia  melatonin 3 milliGRAM(s) Oral at bedtime PRN Insomnia  morphine  - Injectable 2 milliGRAM(s) IV Push every 4 hours PRN Moderate Pain (4 - 6)  ondansetron Injectable 4 milliGRAM(s) IV Push every 8 hours PRN Nausea and/or Vomiting    Vital Signs Last 24 Hrs  T(F): 98 (02 Sep 2023 17:25), Max: 98.3 (01 Sep 2023 18:40)  HR: 99 (02 Sep 2023 17:25) (96 - 109)  BP: 115/70 (02 Sep 2023 17:25) (115/70 - 137/76)  RR: 20 (02 Sep 2023 17:25) (18 - 22)  SpO2: 98% (02 Sep 2023 17:25) (94% - 98%)  I&O's Summary    01 Sep 2023 07:01  -  02 Sep 2023 07:00  --------------------------------------------------------  IN: 2508 mL / OUT: 430 mL / NET: 2078 mL      PHYSICAL EXAM:  General: NAD, A/O x 2  ENT: MMM  Neck: Supple, No JVD  Lungs: Clear to auscultation bilaterally  Cardio: RRR, S1/S2, No murmurs  Abdomen: Soft, Nontender, Nondistended; Bowel sounds present +BM  Extremities: Right hip dressing removed and redressed.  There is no further bleeding noted at the surgical incisions.     Aquacel dressings placed and then wrapped with ace bandage.    Right foot warm, Good CR , + neurovascular intact .   Thigh supple with areas of ecchymosis along posterior thigh.    LABS:                        10.4   14.43 )-----------( 133      ( 02 Sep 2023 07:49 )             30.7     09-02    140  |  109  |  21  ----------------------------<  116  4.2   |  22  |  0.83    Ca    8.5      02 Sep 2023 07:49    TPro  6.3  /  Alb  2.7  /  TBili  0.5  /  DBili  x   /  AST  25  /  ALT  20  /  AlkPhos  105  08-31      PT/INR - ( 31 Aug 2023 09:28 )   PT: 10.5 sec;   INR: 0.89 ratio             CARDIAC MARKERS ( 31 Aug 2023 12:20 )  x     / 14.7 ng/L / x     / x     / x      CARDIAC MARKERS ( 31 Aug 2023 09:28 )  x     / 10.5 ng/L / x     / x     / x       Urinalysis Basic - ( 02 Sep 2023 07:49 )    Color: x / Appearance: x / SG: x / pH: x  Gluc: 116 mg/dL / Ketone: x  / Bili: x / Urobili: x   Blood: x / Protein: x / Nitrite: x   Leuk Esterase: x / RBC: x / WBC x   Sq Epi: x / Non Sq Epi: x / Bacteria: x    COVID-19 PCR: NotDetec (08-31-23 @ 09:45)      RADIOLOGY & ADDITIONAL TESTS:    Care Discussed with Consultants/Other Providers:

## 2023-09-02 NOTE — PHYSICAL THERAPY INITIAL EVALUATION ADULT - IMPAIRMENTS FOUND, PT EVAL
gait, locomotion, and balance
aerobic capacity/endurance/gait, locomotion, and balance/muscle strength/ROM

## 2023-09-03 LAB
ANION GAP SERPL CALC-SCNC: 9 MMOL/L — SIGNIFICANT CHANGE UP (ref 5–17)
APPEARANCE UR: CLEAR — SIGNIFICANT CHANGE UP
BILIRUB UR-MCNC: NEGATIVE — SIGNIFICANT CHANGE UP
BUN SERPL-MCNC: 19 MG/DL — SIGNIFICANT CHANGE UP (ref 7–23)
CALCIUM SERPL-MCNC: 8.1 MG/DL — LOW (ref 8.4–10.5)
CHLORIDE SERPL-SCNC: 107 MMOL/L — SIGNIFICANT CHANGE UP (ref 96–108)
CO2 SERPL-SCNC: 24 MMOL/L — SIGNIFICANT CHANGE UP (ref 22–31)
COLOR SPEC: YELLOW — SIGNIFICANT CHANGE UP
CREAT SERPL-MCNC: 0.97 MG/DL — SIGNIFICANT CHANGE UP (ref 0.5–1.3)
DIFF PNL FLD: ABNORMAL
EGFR: 54 ML/MIN/1.73M2 — LOW
GLUCOSE SERPL-MCNC: 111 MG/DL — HIGH (ref 70–99)
GLUCOSE UR QL: NEGATIVE MG/DL — SIGNIFICANT CHANGE UP
HCT VFR BLD CALC: 28.2 % — LOW (ref 34.5–45)
HGB BLD-MCNC: 9.4 G/DL — LOW (ref 11.5–15.5)
KETONES UR-MCNC: ABNORMAL MG/DL
LEUKOCYTE ESTERASE UR-ACNC: NEGATIVE — SIGNIFICANT CHANGE UP
MCHC RBC-ENTMCNC: 30.1 PG — SIGNIFICANT CHANGE UP (ref 27–34)
MCHC RBC-ENTMCNC: 33.3 GM/DL — SIGNIFICANT CHANGE UP (ref 32–36)
MCV RBC AUTO: 90.4 FL — SIGNIFICANT CHANGE UP (ref 80–100)
NITRITE UR-MCNC: NEGATIVE — SIGNIFICANT CHANGE UP
NRBC # BLD: 0 /100 WBCS — SIGNIFICANT CHANGE UP (ref 0–0)
PH UR: 5.5 — SIGNIFICANT CHANGE UP (ref 5–8)
PLATELET # BLD AUTO: 140 K/UL — LOW (ref 150–400)
POTASSIUM SERPL-MCNC: 3.4 MMOL/L — LOW (ref 3.5–5.3)
POTASSIUM SERPL-SCNC: 3.4 MMOL/L — LOW (ref 3.5–5.3)
PROT UR-MCNC: NEGATIVE MG/DL — SIGNIFICANT CHANGE UP
RBC # BLD: 3.12 M/UL — LOW (ref 3.8–5.2)
RBC # FLD: 15.7 % — HIGH (ref 10.3–14.5)
SODIUM SERPL-SCNC: 140 MMOL/L — SIGNIFICANT CHANGE UP (ref 135–145)
SP GR SPEC: 1.01 — SIGNIFICANT CHANGE UP (ref 1–1.03)
UROBILINOGEN FLD QL: 1 MG/DL — SIGNIFICANT CHANGE UP (ref 0.2–1)
WBC # BLD: 11.74 K/UL — HIGH (ref 3.8–10.5)
WBC # FLD AUTO: 11.74 K/UL — HIGH (ref 3.8–10.5)

## 2023-09-03 PROCEDURE — 70450 CT HEAD/BRAIN W/O DYE: CPT | Mod: 26

## 2023-09-03 PROCEDURE — 99232 SBSQ HOSP IP/OBS MODERATE 35: CPT | Mod: FS

## 2023-09-03 RX ORDER — QUETIAPINE FUMARATE 200 MG/1
12.5 TABLET, FILM COATED ORAL ONCE
Refills: 0 | Status: COMPLETED | OUTPATIENT
Start: 2023-09-03 | End: 2023-09-03

## 2023-09-03 RX ORDER — QUETIAPINE FUMARATE 200 MG/1
1.25 TABLET, FILM COATED ORAL ONCE
Refills: 0 | Status: DISCONTINUED | OUTPATIENT
Start: 2023-09-03 | End: 2023-09-03

## 2023-09-03 RX ORDER — QUETIAPINE FUMARATE 200 MG/1
25 TABLET, FILM COATED ORAL AT BEDTIME
Refills: 0 | Status: DISCONTINUED | OUTPATIENT
Start: 2023-09-03 | End: 2023-09-05

## 2023-09-03 RX ORDER — POTASSIUM CHLORIDE 20 MEQ
20 PACKET (EA) ORAL ONCE
Refills: 0 | Status: COMPLETED | OUTPATIENT
Start: 2023-09-03 | End: 2023-09-03

## 2023-09-03 RX ADMIN — Medication 25 MILLIGRAM(S): at 05:12

## 2023-09-03 RX ADMIN — QUETIAPINE FUMARATE 12.5 MILLIGRAM(S): 200 TABLET, FILM COATED ORAL at 08:11

## 2023-09-03 RX ADMIN — Medication 25 MILLIGRAM(S): at 17:42

## 2023-09-03 RX ADMIN — Medication 75 MICROGRAM(S): at 05:12

## 2023-09-03 RX ADMIN — Medication 20 MILLIEQUIVALENT(S): at 12:00

## 2023-09-03 RX ADMIN — Medication 81 MILLIGRAM(S): at 17:41

## 2023-09-03 RX ADMIN — AMLODIPINE BESYLATE 5 MILLIGRAM(S): 2.5 TABLET ORAL at 05:12

## 2023-09-03 RX ADMIN — PANTOPRAZOLE SODIUM 40 MILLIGRAM(S): 20 TABLET, DELAYED RELEASE ORAL at 05:12

## 2023-09-03 RX ADMIN — Medication 81 MILLIGRAM(S): at 05:12

## 2023-09-03 RX ADMIN — MIRTAZAPINE 7.5 MILLIGRAM(S): 45 TABLET, ORALLY DISINTEGRATING ORAL at 23:10

## 2023-09-03 RX ADMIN — TAMSULOSIN HYDROCHLORIDE 0.4 MILLIGRAM(S): 0.4 CAPSULE ORAL at 23:10

## 2023-09-03 RX ADMIN — ATORVASTATIN CALCIUM 40 MILLIGRAM(S): 80 TABLET, FILM COATED ORAL at 23:10

## 2023-09-03 RX ADMIN — QUETIAPINE FUMARATE 25 MILLIGRAM(S): 200 TABLET, FILM COATED ORAL at 23:10

## 2023-09-03 NOTE — PROGRESS NOTE ADULT - SUBJECTIVE AND OBJECTIVE BOX
Patient is a 89y old  Female who presents with a chief complaint of Status Post Fall with Right Hip Pain (02 Sep 2023 15:38)      Patient seen and examined at bedside.    ALLERGIES:  hydrochlorothiazide (Anaphylaxis; Angioedema)  cefuroxime (Dystonic RXN)  levofloxacin (Flushing)  sulfamethoxazole (Unknown)    MEDICATIONS  (STANDING):  amLODIPine   Tablet 5 milliGRAM(s) Oral daily  aspirin  chewable 81 milliGRAM(s) Oral two times a day  atorvastatin 40 milliGRAM(s) Oral at bedtime  levothyroxine 75 MICROGram(s) Oral daily  metoprolol tartrate 25 milliGRAM(s) Oral every 12 hours  mirtazapine 7.5 milliGRAM(s) Oral at bedtime  pantoprazole    Tablet 40 milliGRAM(s) Oral before breakfast  sodium chloride 0.9%. 1000 milliLiter(s) (75 mL/Hr) IV Continuous <Continuous>  tamsulosin 0.4 milliGRAM(s) Oral at bedtime    MEDICATIONS  (PRN):  acetaminophen     Tablet .. 650 milliGRAM(s) Oral every 6 hours PRN Temp greater or equal to 38C (100.4F), Mild Pain (1 - 3)  aluminum hydroxide/magnesium hydroxide/simethicone Suspension 30 milliLiter(s) Oral every 4 hours PRN Dyspepsia  melatonin 3 milliGRAM(s) Oral at bedtime PRN Insomnia  morphine  - Injectable 2 milliGRAM(s) IV Push every 4 hours PRN Moderate Pain (4 - 6)  ondansetron Injectable 4 milliGRAM(s) IV Push every 8 hours PRN Nausea and/or Vomiting    Vital Signs Last 24 Hrs  T(F): 97.8 (03 Sep 2023 05:06), Max: 98.1 (02 Sep 2023 21:00)  HR: 107 (03 Sep 2023 05:06) (93 - 107)  BP: 143/73 (03 Sep 2023 05:06) (115/70 - 143/73)  RR: 20 (03 Sep 2023 05:06) (19 - 20)  SpO2: 96% (03 Sep 2023 05:06) (96% - 98%)  I&O's Summary    01 Sep 2023 07:01  -  02 Sep 2023 07:00  --------------------------------------------------------  IN: 2508 mL / OUT: 430 mL / NET: 2078 mL    02 Sep 2023 07:01  -  03 Sep 2023 06:57  --------------------------------------------------------  IN: 0 mL / OUT: 350 mL / NET: -350 mL      PHYSICAL EXAM:  General: NAD, A/O x 3  ENT: MMM  Neck: Supple, No JVD  Lungs: Clear to auscultation bilaterally, Non labored breathing   Cardio: RRR, S1/S2, No murmurs  Abdomen: Soft, Nontender, Nondistended; Bowel sounds present  Extremities: No calf tenderness, No pitting edema    LABS:                        10.4   14.43 )-----------( 133      ( 02 Sep 2023 07:49 )             30.7     09-02    140  |  109  |  21  ----------------------------<  116  4.2   |  22  |  0.83    Ca    8.5      02 Sep 2023 07:49    TPro  6.3  /  Alb  2.7  /  TBili  0.5  /  DBili  x   /  AST  25  /  ALT  20  /  AlkPhos  105  08-31      PT/INR - ( 31 Aug 2023 09:28 )   PT: 10.5 sec;   INR: 0.89 ratio             CARDIAC MARKERS ( 31 Aug 2023 12:20 )  x     / 14.7 ng/L / x     / x     / x      CARDIAC MARKERS ( 31 Aug 2023 09:28 )  x     / 10.5 ng/L / x     / x     / x                            Urinalysis Basic - ( 02 Sep 2023 07:49 )    Color: x / Appearance: x / SG: x / pH: x  Gluc: 116 mg/dL / Ketone: x  / Bili: x / Urobili: x   Blood: x / Protein: x / Nitrite: x   Leuk Esterase: x / RBC: x / WBC x   Sq Epi: x / Non Sq Epi: x / Bacteria: x        COVID-19 PCR: NotDetec (08-31-23 @ 09:45)    RADIOLOGY & ADDITIONAL TESTS:    Care Discussed with Consultants/Other Providers:    Patient is a 89y old  Female who presents with a chief complaint of Status Post Fall with Right Hip Pain (02 Sep 2023 15:38)      Patient seen and examined at bedside.  Pt more confused than yesterday - appearing to be hallucinating at times.        ALLERGIES:  hydrochlorothiazide (Anaphylaxis; Angioedema)  cefuroxime (Dystonic RXN)  levofloxacin (Flushing)  sulfamethoxazole (Unknown)    MEDICATIONS  (STANDING):  amLODIPine   Tablet 5 milliGRAM(s) Oral daily  aspirin  chewable 81 milliGRAM(s) Oral two times a day  atorvastatin 40 milliGRAM(s) Oral at bedtime  levothyroxine 75 MICROGram(s) Oral daily  metoprolol tartrate 25 milliGRAM(s) Oral every 12 hours  mirtazapine 7.5 milliGRAM(s) Oral at bedtime  pantoprazole    Tablet 40 milliGRAM(s) Oral before breakfast  sodium chloride 0.9%. 1000 milliLiter(s) (75 mL/Hr) IV Continuous <Continuous>  tamsulosin 0.4 milliGRAM(s) Oral at bedtime    MEDICATIONS  (PRN):  acetaminophen     Tablet .. 650 milliGRAM(s) Oral every 6 hours PRN Temp greater or equal to 38C (100.4F), Mild Pain (1 - 3)  aluminum hydroxide/magnesium hydroxide/simethicone Suspension 30 milliLiter(s) Oral every 4 hours PRN Dyspepsia  melatonin 3 milliGRAM(s) Oral at bedtime PRN Insomnia  morphine  - Injectable 2 milliGRAM(s) IV Push every 4 hours PRN Moderate Pain (4 - 6)  ondansetron Injectable 4 milliGRAM(s) IV Push every 8 hours PRN Nausea and/or Vomiting    Vital Signs Last 24 Hrs  T(F): 97.8 (03 Sep 2023 05:06), Max: 98.1 (02 Sep 2023 21:00)  HR: 107 (03 Sep 2023 05:06) (93 - 107)  BP: 143/73 (03 Sep 2023 05:06) (115/70 - 143/73)  RR: 20 (03 Sep 2023 05:06) (19 - 20)  SpO2: 96% (03 Sep 2023 05:06) (96% - 98%)  I&O's Summary    01 Sep 2023 07:01  -  02 Sep 2023 07:00  --------------------------------------------------------  IN: 2508 mL / OUT: 430 mL / NET: 2078 mL    02 Sep 2023 07:01  -  03 Sep 2023 06:57  --------------------------------------------------------  IN: 0 mL / OUT: 350 mL / NET: -350 mL      PHYSICAL EXAM:  General:89 y/o female in  NAD, A/O x 1 confused and aggitated this am wanting to get out of bed and talking to people who aren't there   ENT: MMM  Neck: Supple, No JVD  Lungs: Clear to auscultation bilaterally, Non labored breathing   Cardio: RRR, S1/S2, No murmurs  Abdomen: Soft,  tenderness to palpation , Nondistended; Bowel sounds present  Extremities: No calf tenderness, No pitting edema    LABS:                        10.4   14.43 )-----------( 133      ( 02 Sep 2023 07:49 )             30.7     09-02    140  |  109  |  21  ----------------------------<  116  4.2   |  22  |  0.83    Ca    8.5      02 Sep 2023 07:49    TPro  6.3  /  Alb  2.7  /  TBili  0.5  /  DBili  x   /  AST  25  /  ALT  20  /  AlkPhos  105  08-31      PT/INR - ( 31 Aug 2023 09:28 )   PT: 10.5 sec;   INR: 0.89 ratio             CARDIAC MARKERS ( 31 Aug 2023 12:20 )  x     / 14.7 ng/L / x     / x     / x      CARDIAC MARKERS ( 31 Aug 2023 09:28 )  x     / 10.5 ng/L / x     / x     / x                            Urinalysis Basic - ( 02 Sep 2023 07:49 )    Color: x / Appearance: x / SG: x / pH: x  Gluc: 116 mg/dL / Ketone: x  / Bili: x / Urobili: x   Blood: x / Protein: x / Nitrite: x   Leuk Esterase: x / RBC: x / WBC x   Sq Epi: x / Non Sq Epi: x / Bacteria: x        COVID-19 PCR: NotDetec (08-31-23 @ 09:45)    RADIOLOGY & ADDITIONAL TESTS:    Care Discussed with Consultants/Other Providers:

## 2023-09-03 NOTE — PROGRESS NOTE ADULT - ASSESSMENT
89yo female w. 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    *Right Intertrochanteric Fracture s/p Fall  *Leukocytosis- possible reactive - no fevers noted   -Ortho following and   recs  -RLE WBAT  -Pain control PRN   - Boewl Regimen PRN  -asa 81 mg for DVT proph  PT eval recommend Sub acute rehab     *Acute Blood loss Anemia  2/2 Post-op wound bleeding  -H/H 10/30 s/p 2 units PRBC 9/1   -dressing clean and dry no  further bleeding at this time , Ortho following    *CAD s/p PCI/Grade II Diastolic Dysfunction with EF 55-60%  *HTN/HLD/Valvular Disease (Mod-Severe MR, Mod TR)/Mod pHTN  -EKG showed new LBBB compared to previous EKG in 2021, trop negative x1 and patient has no cardiac complaints  -Repeat TTE as above  -Appreciate Cardiology clearance  -Discussed with surgery team, okay to continue ASA  -Continue home meds: Metoprolol, Amlodipine, Statin     *Hypothyroidism  -Continue Levothyroxine    *CKD  -Baseline Cr 0.9-1.1 range, stable  -Avoid nephrotoxic agents    *Urinary Retention  -Continue Tamsulosin  -Monitor for s/s retention, bladder scan as needed     *Hiatal Hernia  -Continue PPI     *Depression  *Dementia  increased aggitation this am  -1-1 observation   -Continue mirtazapine    *DVT PPx  -asa 81mg for DVT proph     GOC/Code Status: DNR/DNI  review MOLST/d/w Family  Dispo: Pending PT Eval. Clinical course as above.   9.2  update daughter at bedside- discussed with her that mom was most likely going to be going to rehab ctr family deciding on location - Family-Lon, Son [phone] and NEELAM Braswell [phone] -  notified    89yo female w. 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    *Right Intertrochanteric Fracture s/p Fall  *confusion/psychosis  *Leukocytosis- possible reactive - no fevers noted   -Ortho following and  recs  -RLE WBAT  -Pain control PRN   - Boewl Regimen PRN  -asa 81 mg for DVT proph  - PT eval recommend Sub acute rehab   -CT head urgent - evaluate increase confusion  -OOB to chair   -UA   - Bladder scan q6 hour   - Puuujsne23.5 q am and 25 mg po qhs   -f no improvement in mental status will consult psych     *Acute Blood loss Anemia  2/2 Post-op wound bleeding  -H/H 10/30 s/p 2 units PRBC 9/1   -dressing clean and dry no  further bleeding at this time , Ortho following    *CAD s/p PCI/Grade II Diastolic Dysfunction with EF 55-60%  *HTN/HLD/Valvular Disease (Mod-Severe MR, Mod TR)/Mod pHTN  -EKG showed new LBBB compared to previous EKG in 2021, trop negative x1 and patient has no cardiac complaints  -Repeat TTE as above  -Appreciate Cardiology clearance  -Discussed with surgery team, okay to continue ASA  -Continue home meds: Metoprolol, Amlodipine, Statin     *Hypothyroidism  -Continue Levothyroxine    *CKD  -Baseline Cr 0.9-1.1 range, stable  -Avoid nephrotoxic agents    *Urinary Retention  -Continue Tamsulosin  -Monitor for s/s retention, bladder scan as needed     *Hiatal Hernia  -Continue PPI     *Depression  *Dementia  increased aggitation this am  -1-1 observation   -Continue mirtazapine    *DVT PPx  -asa 81mg for DVT proph     GOC/Code Status: DNR/DNI  review MOLST/d/w Family  Dispo: Pending PT Eval. Clinical course as above.   9.3  family at bedside - Family-Lon, Son [phone] and NEELAM Braswell [phone] - CM notified   Family concerned about moms increased confusion and aggitation   Plan is for DANICA when stable    89yo female w. 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    *Right Intertrochanteric Fracture s/p Fall  *confusion/psychosis  *Leukocytosis- possible reactive - no fevers noted   -Ortho following and  recs  -RLE WBAT  -Pain control PRN   - Bowel Regimen PRN  -asa 81 mg for DVT proph  - PT eval recommend Sub acute rehab   -CT head urgent - evaluate increase confusion  -OOB to chair   -UA   - Bladder scan q6 hour   - Xkbezukh47.5 q am and 25 mg po qhs   -If no improvement in mental status will consult psych     *Acute Blood loss Anemia  2/2 Post-op wound bleeding  -H/H 10/30 s/p 2 units PRBC 9/1   -dressing clean and dry no  further bleeding at this time , Ortho following    *CAD s/p PCI/Grade II Diastolic Dysfunction with EF 55-60%  *HTN/HLD/Valvular Disease (Mod-Severe MR, Mod TR)/Mod pHTN  -EKG showed new LBBB compared to previous EKG in 2021, trop negative x1 and patient has no cardiac complaints  -Repeat TTE as above  -Appreciate Cardiology clearance  -Discussed with surgery team, okay to continue ASA  -Continue home meds: Metoprolol, Amlodipine, Statin     *Hypothyroidism  -Continue Levothyroxine    *CKD  -Baseline Cr 0.9-1.1 range, stable  -Avoid nephrotoxic agents    *Urinary Retention  -Continue Tamsulosin  -Monitor for s/s retention, bladder scan as needed     *Hiatal Hernia  -Continue PPI     *Depression  *Dementia  increased aggitation this am  -1-1 observation   -Continue mirtazapine    *DVT PPx  -asa 81mg for DVT proph     GOC/Code Status: DNR/DNI  review MOLST/d/w Family  Dispo: Pending PT Eval. Clinical course as above.   9.3  family at bedside - Family-Lon, Son [phone] and NEELAM Braswell [phone] - CM notified   Family concerned about moms increased confusion and aggitation   Plan is for DANICA when stable

## 2023-09-03 NOTE — PROGRESS NOTE ADULT - SUBJECTIVE AND OBJECTIVE BOX
ORTHOPEDIC PROGRESS NOTE  Pt. seen and examined at bedside resting comfortably. NAEO. Pleasantly confused. No acute complaints. Tolerating diet, denies n/v.  Denies fever/chills, chest pain, dyspnea, cough, dizziness.     Vital Signs Last 24 Hrs  T(C): 36.6 (03 Sep 2023 05:06), Max: 36.7 (02 Sep 2023 17:25)  T(F): 97.8 (03 Sep 2023 05:06), Max: 98.1 (02 Sep 2023 21:00)  HR: 107 (03 Sep 2023 05:06) (93 - 107)  BP: 143/73 (03 Sep 2023 05:06) (115/70 - 143/73)  BP(mean): --  RR: 20 (03 Sep 2023 05:06) (19 - 20)  SpO2: 96% (03 Sep 2023 05:06) (96% - 98%)    Parameters below as of 03 Sep 2023 05:06  Patient On (Oxygen Delivery Method): room air        PHYSICAL EXAM:    GENERAL: NAD  HEAD:  Atraumatic, Normocephalic  EYES: EOMI, PERRLA, conjunctiva and sclera clear  ABDOMEN: soft, ND, NT  EXTREMITIES: RLE warm, compartments soft, moderate edema to R hip. ttp around hip, dressing c/d/i, sensorimotor intact, DP pulse palpable    I&O's Detail    02 Sep 2023 07:01  -  03 Sep 2023 07:00  --------------------------------------------------------  IN:  Total IN: 0 mL    OUT:    Voided (mL): 350 mL  Total OUT: 350 mL    Total NET: -350 mL          LABS:                        9.4    11.74 )-----------( 140      ( 03 Sep 2023 05:26 )             28.2     09-03    140  |  107  |  19  ----------------------------<  111<H>  3.4<L>   |  24  |  0.97    Ca    8.1<L>      03 Sep 2023 05:26        Urinalysis Basic - ( 03 Sep 2023 05:26 )    Color: x / Appearance: x / SG: x / pH: x  Gluc: 111 mg/dL / Ketone: x  / Bili: x / Urobili: x   Blood: x / Protein: x / Nitrite: x   Leuk Esterase: x / RBC: x / WBC x   Sq Epi: x / Non Sq Epi: x / Bacteria: x      Impression: 89 year old female with hx of CADs/p PCI , valvular disease, moderate MR , Mod TR , CKD  is now stable POD #3 ORIF right hip. Recovering well, HD stable. Hgb stable on AM labs 9.4, s/p 2U PRBC. Wbc downtrending, likely reactive.    Plan:  PT  WBAT right leg   ASA 81 mg bid for DVT prophylaxis started   Trend hgb  Pain management with tylenol   Rehab planning when medically stable     Discussed w/ Dr. JONES  Orthopedics

## 2023-09-04 ENCOUNTER — NON-APPOINTMENT (OUTPATIENT)
Age: 88
End: 2023-09-04

## 2023-09-04 LAB
ANION GAP SERPL CALC-SCNC: 4 MMOL/L — LOW (ref 5–17)
BUN SERPL-MCNC: 20 MG/DL — SIGNIFICANT CHANGE UP (ref 7–23)
CALCIUM SERPL-MCNC: 8.2 MG/DL — LOW (ref 8.4–10.5)
CHLORIDE SERPL-SCNC: 108 MMOL/L — SIGNIFICANT CHANGE UP (ref 96–108)
CO2 SERPL-SCNC: 27 MMOL/L — SIGNIFICANT CHANGE UP (ref 22–31)
CREAT SERPL-MCNC: 0.78 MG/DL — SIGNIFICANT CHANGE UP (ref 0.5–1.3)
EGFR: 70 ML/MIN/1.73M2 — SIGNIFICANT CHANGE UP
GLUCOSE SERPL-MCNC: 109 MG/DL — HIGH (ref 70–99)
HCT VFR BLD CALC: 25.4 % — LOW (ref 34.5–45)
HGB BLD-MCNC: 8.4 G/DL — LOW (ref 11.5–15.5)
MCHC RBC-ENTMCNC: 30.1 PG — SIGNIFICANT CHANGE UP (ref 27–34)
MCHC RBC-ENTMCNC: 33.1 GM/DL — SIGNIFICANT CHANGE UP (ref 32–36)
MCV RBC AUTO: 91 FL — SIGNIFICANT CHANGE UP (ref 80–100)
NRBC # BLD: 0 /100 WBCS — SIGNIFICANT CHANGE UP (ref 0–0)
PLATELET # BLD AUTO: 136 K/UL — LOW (ref 150–400)
POTASSIUM SERPL-MCNC: 3.6 MMOL/L — SIGNIFICANT CHANGE UP (ref 3.5–5.3)
POTASSIUM SERPL-SCNC: 3.6 MMOL/L — SIGNIFICANT CHANGE UP (ref 3.5–5.3)
RBC # BLD: 2.79 M/UL — LOW (ref 3.8–5.2)
RBC # FLD: 15.7 % — HIGH (ref 10.3–14.5)
SODIUM SERPL-SCNC: 139 MMOL/L — SIGNIFICANT CHANGE UP (ref 135–145)
WBC # BLD: 9.57 K/UL — SIGNIFICANT CHANGE UP (ref 3.8–10.5)
WBC # FLD AUTO: 9.57 K/UL — SIGNIFICANT CHANGE UP (ref 3.8–10.5)

## 2023-09-04 PROCEDURE — 73700 CT LOWER EXTREMITY W/O DYE: CPT | Mod: 26,RT

## 2023-09-04 PROCEDURE — 99232 SBSQ HOSP IP/OBS MODERATE 35: CPT | Mod: FS

## 2023-09-04 RX ADMIN — Medication 25 MILLIGRAM(S): at 17:20

## 2023-09-04 RX ADMIN — SODIUM CHLORIDE 75 MILLILITER(S): 9 INJECTION INTRAMUSCULAR; INTRAVENOUS; SUBCUTANEOUS at 10:36

## 2023-09-04 RX ADMIN — MIRTAZAPINE 7.5 MILLIGRAM(S): 45 TABLET, ORALLY DISINTEGRATING ORAL at 21:50

## 2023-09-04 RX ADMIN — TAMSULOSIN HYDROCHLORIDE 0.4 MILLIGRAM(S): 0.4 CAPSULE ORAL at 21:50

## 2023-09-04 RX ADMIN — PANTOPRAZOLE SODIUM 40 MILLIGRAM(S): 20 TABLET, DELAYED RELEASE ORAL at 09:17

## 2023-09-04 RX ADMIN — Medication 81 MILLIGRAM(S): at 17:21

## 2023-09-04 RX ADMIN — AMLODIPINE BESYLATE 5 MILLIGRAM(S): 2.5 TABLET ORAL at 05:09

## 2023-09-04 RX ADMIN — Medication 75 MICROGRAM(S): at 05:09

## 2023-09-04 RX ADMIN — Medication 25 MILLIGRAM(S): at 05:09

## 2023-09-04 RX ADMIN — Medication 81 MILLIGRAM(S): at 05:09

## 2023-09-04 RX ADMIN — QUETIAPINE FUMARATE 25 MILLIGRAM(S): 200 TABLET, FILM COATED ORAL at 21:50

## 2023-09-04 RX ADMIN — ATORVASTATIN CALCIUM 40 MILLIGRAM(S): 80 TABLET, FILM COATED ORAL at 21:50

## 2023-09-04 NOTE — PROGRESS NOTE ADULT - SUBJECTIVE AND OBJECTIVE BOX
F/U Note:    89y Female admitted POD # 4 S/P ORIF, hip, using trochanteric kat for hip fracture     Interval Hx;    Vital Signs Last 24 Hrs  T(C): 36.6 (04 Sep 2023 12:27), Max: 37.3 (03 Sep 2023 21:00)  T(F): 97.9 (04 Sep 2023 12:27), Max: 99.1 (03 Sep 2023 21:00)  HR: 104 (04 Sep 2023 17:18) (87 - 109)  BP: 125/69 (04 Sep 2023 17:18) (125/69 - 131/67)  BP(mean): --  RR: 18 (04 Sep 2023 17:18) (16 - 18)  SpO2: 95% (04 Sep 2023 17:18) (95% - 96%)    Parameters below as of 04 Sep 2023 17:18  Patient On (Oxygen Delivery Method): room air                                8.4    9.57  )-----------( 136      ( 04 Sep 2023 05:00 )             25.4         09-04    139  |  108  |  20  ----------------------------<  109<H>  3.6   |  27  |  0.78    Ca    8.2<L>      04 Sep 2023 06:58          NEURO: no headaches, blurry vision, tremors, depression, anxity  CV: no chest pain, palpitations, murmurs, orthopnea  Resp: no shortness of breath, cough, wheeze, sputum production  GI: no stomach pain,nausea, vomitting, flatulence, hematemesis, hematochezia  PV: no swelling of extremities, no hair loss, no coolness to extremities  ENDO: no polydypsia, polyphagia, polyuria, weight loss, night sweats          NEURO: awake and alert  CV: (+) S1/S2, rrr, no mrg  RESP: CTA b/l  GI: soft, non tender

## 2023-09-04 NOTE — PROGRESS NOTE ADULT - ASSESSMENT
89 year old female with hx of CADs/PCI 2008, Valvular disease  MR, TR, CKD is stable s/p ORIF Right hip.    -- anemia of acute blood loss   -- retaining urine    Discussed with providers and consultants     Plan:   PT WBAT right leg             ASA 81 mg bid for DVT prophylaxis            check labs in am  -  follow hgb             pain management avoid narcotics 2/2 confusion , dementia

## 2023-09-04 NOTE — PROGRESS NOTE ADULT - SUBJECTIVE AND OBJECTIVE BOX
S/P ORIF Right Hip POD 4    Patient was seen and examined by me this am family by her side.   Patient is sleepy this am but answers questions.   Family states that she is less confused today.    She denies pain and appears comfortable .   She denies any CP or SOB.    Vital Signs Last 24 Hrs  T(C): 37.3 (03 Sep 2023 21:00), Max: 37.3 (03 Sep 2023 21:00)  T(F): 99.1 (03 Sep 2023 21:00), Max: 99.1 (03 Sep 2023 21:00)  HR: 109 (04 Sep 2023 04:38) (84 - 109)  BP: 131/67 (04 Sep 2023 04:38) (120/66 - 131/67)  RR: 17 (03 Sep 2023 21:00) (17 - 17)  SpO2: 96% (03 Sep 2023 21:00) (96% - 96%)    Parameters below as of 03 Sep 2023 21:00  Patient On (Oxygen Delivery Method): room air    PAST MEDICAL & SURGICAL HISTORY:  CAD (coronary artery disease)  Anemia  HTN (hypertension)  HLD (hyperlipidemia)  Low back pain  Arthritis  UTI (lower urinary tract infection)  Bronchitis  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  Former smoker, stopped smoking in distant past  S/P angioplasty with stent  RCA with stent 2008  S/P cataract extraction and insertion of intraocular lens  S/P hysterectomy    MEDICATIONS  (STANDING):  amLODIPine   Tablet 5 milliGRAM(s) Oral daily  aspirin  chewable 81 milliGRAM(s) Oral two times a day  atorvastatin 40 milliGRAM(s) Oral at bedtime  levothyroxine 75 MICROGram(s) Oral daily  metoprolol tartrate 25 milliGRAM(s) Oral every 12 hours  mirtazapine 7.5 milliGRAM(s) Oral at bedtime  pantoprazole    Tablet 40 milliGRAM(s) Oral before breakfast  QUEtiapine 25 milliGRAM(s) Oral at bedtime  sodium chloride 0.9%. 1000 milliLiter(s) (75 mL/Hr) IV Continuous <Continuous>  tamsulosin 0.4 milliGRAM(s) Oral at bedtime    MEDICATIONS  (PRN):  acetaminophen     Tablet .. 650 milliGRAM(s) Oral every 6 hours PRN Temp greater or equal to 38C (100.4F), Mild Pain (1 - 3)  aluminum hydroxide/magnesium hydroxide/simethicone Suspension 30 milliLiter(s) Oral every 4 hours PRN Dyspepsia  melatonin 3 milliGRAM(s) Oral at bedtime PRN Insomnia  morphine  - Injectable 2 milliGRAM(s) IV Push every 4 hours PRN Moderate Pain (4 - 6)  ondansetron Injectable 4 milliGRAM(s) IV Push every 8 hours PRN Nausea and/or Vomiting      PE:  Alert and oriented X2 and has been confused   Lungs:  CTA   Cor :   S1S2 + murmur  Abd:  soft, non tender +BS+BM , voiding   Ext:  Right hip dressing reinforced again, still oozing slight from most distal incision   + Neurovascular intact., foot warm good CR  SCD s when in bed                             8.4    9.57  )-----------( 136      ( 04 Sep 2023 05:00 )             25.4   09-04    139  |  108  |  20  ----------------------------<  109<H>  3.6   |  27  |  0.78    Ca    8.2<L>      04 Sep 2023 06:58

## 2023-09-04 NOTE — PROGRESS NOTE ADULT - ASSESSMENT
89yo female w. 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    *Right Intertrochanteric Fracture s/p Fall  *confusion/psychosis  *Leukocytosis- possible reactive - no fevers noted   -Ortho following and  recs  -RLE WBAT  -Pain control PRN   - Boewl Regimen PRN  -asa 81 mg for DVT proph  - PT eval recommend Sub acute rehab   -CT head urgent - evaluate increase confusion  -OOB to chair   -UA   - Bladder scan q6 hour  - 9/3n retained 600 cc - Pt was straight cath and urine cx sent   - Pqobccww25.5 q am and 25 mg po qhs   -if no improvement in mental status will consult psych     *Acute Blood loss Anemia  2/2 Post-op wound bleeding  -H/H 10/30 s/p 2 units PRBC 9/1   -dressing clean and dry no  further bleeding at this time , Ortho following    *CAD s/p PCI/Grade II Diastolic Dysfunction with EF 55-60%  *HTN/HLD/Valvular Disease (Mod-Severe MR, Mod TR)/Mod pHTN  -EKG showed new LBBB compared to previous EKG in 2021, trop negative x1 and patient has no cardiac complaints  -Repeat TTE as above  -Appreciate Cardiology clearance  -Discussed with surgery team, okay to continue ASA  -Continue home meds: Metoprolol, Amlodipine, Statin     *Hypothyroidism  -Continue Levothyroxine    *CKD  -Baseline Cr 0.9-1.1 range, stable  -Avoid nephrotoxic agents    *Urinary Retention  -Continue Tamsulosin  -Monitor for s/s retention, bladder scan as needed     *Hiatal Hernia  -Continue PPI     *Depression  *Dementia  increased aggitation this am  -1-1 observation   -Continue mirtazapine    *DVT PPx  -asa 81mg for DVT proph     GOC/Code Status: DNR/DNI  review MOLST/d/w Family  Dispo: Pending PT Eval. Clinical course as above.   9.3  family at bedside - Family-Lon, Son [phone] and Michaelle, [phone] - CM notified   Family concerned about moms increased confusion and aggitation   Plan is for DANICA when stable    89yo female w. 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    *Right Intertrochanteric Fracture s/p Fall  *confusion/psychosis  *Leukocytosis- possible reactive - no fevers noted   -Ortho following and  recs  -RLE WBAT  -Pain control PRN   - Boewl Regimen PRN  -asa 81 mg for DVT proph  - PT eval recommend Sub acute rehab   -CT head urgent - evaluate increase confusion  -OOB to chair   -UA   - Bladder scan q6 hour  - 9/3n retained 600 cc - Pt was straight cath and urine sent- no signs of UTI   - Qymfpvfc11.5 q am and 25 mg po qhs   -if no improvement in mental status will consult psych     *Acute Blood loss Anemia  2/2 Post-op wound bleeding  -H/H 10/30 s/p 2 units PRBC 9/1   -dressing clean and dry no  further bleeding at this time , Ortho following    *CAD s/p PCI/Grade II Diastolic Dysfunction with EF 55-60%  *HTN/HLD/Valvular Disease (Mod-Severe MR, Mod TR)/Mod pHTN  -EKG showed new LBBB compared to previous EKG in 2021, trop negative x1 and patient has no cardiac complaints  -Repeat TTE as above  -Appreciate Cardiology clearance  -Discussed with surgery team, okay to continue ASA  -Continue home meds: Metoprolol, Amlodipine, Statin     *Hypothyroidism  -Continue Levothyroxine    *CKD  -Baseline Cr 0.9-1.1 range, stable  -Avoid nephrotoxic agents    *Urinary Retention  -Continue Tamsulosin  -Monitor for s/s retention, bladder scan as needed     *Hiatal Hernia  -Continue PPI     *Depression  *Dementia  increased aggitation this am  -1-1 observation   -Continue mirtazapine    *DVT PPx  -asa 81mg for DVT proph     GOC/Code Status: DNR/DNI  review MOLST/d/w Family  Dispo: Pending PT Eval. Clinical course as above.   9.3  family at bedside - Family-Lon, Son [phone] and Michaelle, NEELAM [phone] - CM notified   Family concerned about moms increased confusion and aggitation   Plan is for DANICA when stable    89yo female w. [facility] CAD s/p PCI (2008), grade II diastolic dysfunction with EF 55-60% (Echo 10/2021), valvular disease (mod-severe MR, mod TR), mod pHTN, HTN, HLD, hypothyroidism, CKD, urinary retention, hiatal hernia, remote history of adenocarcinoma of rectum s/p chemo/radiation (>25 years ago), depression, who was brought in from s/p fall with right hip pain found to have right intertrochanteric fracture.    *Right Intertrochanteric Fracture s/p Fall  *confusion/psychosis  *Leukocytosis- possible reactive - no fevers noted   -Ortho following and  recs  -RLE WBAT  -Pain control PRN   - Boewl Regimen PRN  -asa 81 mg for DVT proph  - PT eval recommend Sub acute rehab   -CT head urgent - evaluate increase confusion  -OOB to chair   -UA   - Bladder scan q6 hour  - 9/3n retained 600 cc - Pt was straight cath and urine sent- no signs of UTI   - Zxhpwcfc97.5 q am and 25 mg po qhs   -if no improvement in mental status will consult psych     *Acute Blood loss Anemia  2/2 Post-op wound bleeding  -H/H 8.4/25 ----continues to drop- as per orth will continue to monitor   -s/p 2 units PRBC 9/1   -wound still with blood on dressings --  Ortho following    *CAD s/p PCI/Grade II Diastolic Dysfunction with EF 55-60%  *HTN/HLD/Valvular Disease (Mod-Severe MR, Mod TR)/Mod pHTN  -EKG showed new LBBB compared to previous EKG in 2021, trop negative x1 and patient has no cardiac complaints  -Repeat TTE as above  -Appreciate Cardiology clearance  -Discussed with surgery team, okay to continue ASA  -Continue home meds: Metoprolol, Amlodipine, Statin     *Hypothyroidism  -Continue Levothyroxine    *CKD  -Baseline Cr 0.9-1.1 range, stable  -Avoid nephrotoxic agents    *Urinary Retention  -Continue Tamsulosin  -Monitor for s/s retention, bladder scan as needed     *Hiatal Hernia  -Continue PPI     *Depression  *Dementia  increased aggitation this am  -Continue mirtazapine    *DVT PPx  -asa 81mg for DVT proph     GOC/Code Status: DNR/DNI  review MOLST/d/w Family  Dispo: Pending PT Eval. Clinical course as above.   9.3  family at bedside - Family-Lon, Son [phone] and Michaelle, [phone] - CM notified   Family concerned about moms increased confusion and aggitation   Plan is for DANICA when stable    89yo female w. 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    *Right Intertrochanteric Fracture s/p Fall  *confusion/psychosis  *Leukocytosis- possible reactive - no fevers noted   -Ortho following and  recs  -RLE WBAT  -Pain control PRN   - Boewl Regimen PRN  -asa 81 mg for DVT proph  - PT eval recommend Sub acute rehab   -CT head urgent - evaluate increase confusion  -OOB to chair   -UA   - CT of hip today - f/u hematoma   - Bladder scan q6 hour  - 9/3n retained 600 cc - Pt was straight cath and urine sent- no signs of UTI   - Mjqiptvv66.5 q am and 25 mg po qhs   -if no improvement in mental status will consult psych     *Acute Blood loss Anemia  2/2 Post-op wound bleeding  -H/H 8.4/25 ----continues to drop- as per orth will continue to monitor   -s/p 2 units PRBC 9/1   -wound still with blood on dressings --  Ortho following    *CAD s/p PCI/Grade II Diastolic Dysfunction with EF 55-60%  *HTN/HLD/Valvular Disease (Mod-Severe MR, Mod TR)/Mod pHTN  -EKG showed new LBBB compared to previous EKG in 2021, trop negative x1 and patient has no cardiac complaints  -Repeat TTE as above  -Appreciate Cardiology clearance  -Discussed with surgery team, okay to continue ASA  -Continue home meds: Metoprolol, Amlodipine, Statin     *Hypothyroidism  -Continue Levothyroxine    *CKD  -Baseline Cr 0.9-1.1 range, stable  -Avoid nephrotoxic agents    *Urinary Retention  -Continue Tamsulosin  -Monitor for s/s retention, bladder scan as needed     *Hiatal Hernia  -Continue PPI     *Depression  *Dementia  increased aggitation this am  -Continue mirtazapine    *DVT PPx  -asa 81mg for DVT proph     GOC/Code Status: DNR/DNI  review MOLST/d/w Family  Dispo: Pending PT Eval. Clinical course as above.   9.3  family at bedside - Family-Lon, Son [phone] and MichaelleNEELAM [phone] - CM notified   Family concerned about moms increased confusion and aggitation   Plan is for DANICA when stable

## 2023-09-04 NOTE — PROGRESS NOTE ADULT - ASSESSMENT
89y Female admitted POD # 4 S/P ORIF, hip, using trochanteric kat for hip fracture           PLAN:    -serial exams, any changes in exam to be escelated to surgical team immedietly  -pain control  -finished post op anbx  -on DASH diet  -dressing changes per surgical team  -some issues with sundowning/delirium, currntly started on seroquel. Re-orient as able, and encourage appropriate sleep/wake cycle  -DVT prophylaxis  -AM labs           TIME SPENT:  35 minutes of  time spent providing medical care for patient's acute illness/conditions that impairs at least one vital organ system and/or poses a high risk of imminent or life threatening deterioration in the patient's condition. It includes time spent evaluating and treating the patient's acute illness as well as time spent reviewing labs, radiology, discussing goals of care with patient and/or patient's family, and discussing the case with a multidisciplinary team, including the eICU, in an effort to prevent further life threatening deterioration or end organ damage. This time is independent of any procedures performed.

## 2023-09-04 NOTE — PROGRESS NOTE ADULT - SUBJECTIVE AND OBJECTIVE BOX
Patient is a 89y old  Female who presents with a chief complaint of Status Post Fall with Right Hip Pain (03 Sep 2023 10:21)      Patient seen and examined at bedside.    ALLERGIES:  hydrochlorothiazide (Anaphylaxis; Angioedema)  cefuroxime (Dystonic RXN)  levofloxacin (Flushing)  sulfamethoxazole (Unknown)    MEDICATIONS  (STANDING):  amLODIPine   Tablet 5 milliGRAM(s) Oral daily  aspirin  chewable 81 milliGRAM(s) Oral two times a day  atorvastatin 40 milliGRAM(s) Oral at bedtime  levothyroxine 75 MICROGram(s) Oral daily  metoprolol tartrate 25 milliGRAM(s) Oral every 12 hours  mirtazapine 7.5 milliGRAM(s) Oral at bedtime  pantoprazole    Tablet 40 milliGRAM(s) Oral before breakfast  QUEtiapine 25 milliGRAM(s) Oral at bedtime  sodium chloride 0.9%. 1000 milliLiter(s) (75 mL/Hr) IV Continuous <Continuous>  tamsulosin 0.4 milliGRAM(s) Oral at bedtime    MEDICATIONS  (PRN):  acetaminophen     Tablet .. 650 milliGRAM(s) Oral every 6 hours PRN Temp greater or equal to 38C (100.4F), Mild Pain (1 - 3)  aluminum hydroxide/magnesium hydroxide/simethicone Suspension 30 milliLiter(s) Oral every 4 hours PRN Dyspepsia  melatonin 3 milliGRAM(s) Oral at bedtime PRN Insomnia  morphine  - Injectable 2 milliGRAM(s) IV Push every 4 hours PRN Moderate Pain (4 - 6)  ondansetron Injectable 4 milliGRAM(s) IV Push every 8 hours PRN Nausea and/or Vomiting    Vital Signs Last 24 Hrs  T(F): 99.1 (03 Sep 2023 21:00), Max: 99.1 (03 Sep 2023 21:00)  HR: 109 (04 Sep 2023 04:38) (84 - 109)  BP: 131/67 (04 Sep 2023 04:38) (120/66 - 131/67)  RR: 17 (03 Sep 2023 21:00) (17 - 17)  SpO2: 96% (03 Sep 2023 21:00) (96% - 96%)  I&O's Summary    02 Sep 2023 07:01  -  03 Sep 2023 07:00  --------------------------------------------------------  IN: 0 mL / OUT: 350 mL / NET: -350 mL      PHYSICAL EXAM:  General: NAD, A/O x 3  ENT: MMM  Neck: Supple, No JVD  Lungs: Clear to auscultation bilaterally, Non labored breathing   Cardio: RRR, S1/S2, No murmurs  Abdomen: Soft, Nontender, Nondistended; Bowel sounds present  Extremities: No calf tenderness, No pitting edema    LABS:                        9.4    11.74 )-----------( 140      ( 03 Sep 2023 05:26 )             28.2         140  |  107  |  19  ----------------------------<  111  3.4   |  24  |  0.97    Ca    8.1      03 Sep 2023 05:26                                  Urinalysis Basic - ( 03 Sep 2023 11:13 )    Color: Yellow / Appearance: Clear / S.015 / pH: x  Gluc: x / Ketone: Trace mg/dL  / Bili: Negative / Urobili: 1.0 mg/dL   Blood: x / Protein: Negative mg/dL / Nitrite: Negative   Leuk Esterase: Negative / RBC: 2 /HPF / WBC 0 /HPF   Sq Epi: x / Non Sq Epi: x / Bacteria: Negative /HPF        COVID-19 PCR: NotDetec (23 @ 09:45)    RADIOLOGY & ADDITIONAL TESTS:    Care Discussed with Consultants/Other Providers:    Patient is a 89y old  Female who presents with a chief complaint of Status Post Fall with Right Hip Pain (03 Sep 2023 10:21)      Patient seen and examined at bedside.  Pt appearing calmer and redirectable this am.     ALLERGIES:  hydrochlorothiazide (Anaphylaxis; Angioedema)  cefuroxime (Dystonic RXN)  levofloxacin (Flushing)  sulfamethoxazole (Unknown)    MEDICATIONS  (STANDING):  amLODIPine   Tablet 5 milliGRAM(s) Oral daily  aspirin  chewable 81 milliGRAM(s) Oral two times a day  atorvastatin 40 milliGRAM(s) Oral at bedtime  levothyroxine 75 MICROGram(s) Oral daily  metoprolol tartrate 25 milliGRAM(s) Oral every 12 hours  mirtazapine 7.5 milliGRAM(s) Oral at bedtime  pantoprazole    Tablet 40 milliGRAM(s) Oral before breakfast  QUEtiapine 25 milliGRAM(s) Oral at bedtime  sodium chloride 0.9%. 1000 milliLiter(s) (75 mL/Hr) IV Continuous <Continuous>  tamsulosin 0.4 milliGRAM(s) Oral at bedtime    MEDICATIONS  (PRN):  acetaminophen     Tablet .. 650 milliGRAM(s) Oral every 6 hours PRN Temp greater or equal to 38C (100.4F), Mild Pain (1 - 3)  aluminum hydroxide/magnesium hydroxide/simethicone Suspension 30 milliLiter(s) Oral every 4 hours PRN Dyspepsia  melatonin 3 milliGRAM(s) Oral at bedtime PRN Insomnia  morphine  - Injectable 2 milliGRAM(s) IV Push every 4 hours PRN Moderate Pain (4 - 6)  ondansetron Injectable 4 milliGRAM(s) IV Push every 8 hours PRN Nausea and/or Vomiting    Vital Signs Last 24 Hrs  T(F): 99.1 (03 Sep 2023 21:00), Max: 99.1 (03 Sep 2023 21:00)  HR: 109 (04 Sep 2023 04:38) (84 - 109)  BP: 131/67 (04 Sep 2023 04:38) (120/66 - 131/67)  RR: 17 (03 Sep 2023 21:00) (17 - 17)  SpO2: 96% (03 Sep 2023 21:00) (96% - 96%)  I&O's Summary    02 Sep 2023 07:01  -  03 Sep 2023 07:00  --------------------------------------------------------  IN: 0 mL / OUT: 350 mL / NET: -350 mL      PHYSICAL EXAM:  General: 89 y/o female in NAD, A/O x 1  ENT: MMM  Neck: Supple, No JVD  Lungs: Clear to auscultation bilaterally, Non labored breathing   Cardio: RRR, S1/S2, No murmurs  Abdomen: Soft, Nontender, Nondistended; Bowel sounds present  Extremities: No calf tenderness, Right hip wlth large ecchymotic  area dressing- blood stained- wound still with oozing      LABS:                        9.4    11.74 )-----------( 140      ( 03 Sep 2023 05:26 )             28.2         140  |  107  |  19  ----------------------------<  111  3.4   |  24  |  0.97    Ca    8.1      03 Sep 2023 05:26                                  Urinalysis Basic - ( 03 Sep 2023 11:13 )    Color: Yellow / Appearance: Clear / S.015 / pH: x  Gluc: x / Ketone: Trace mg/dL  / Bili: Negative / Urobili: 1.0 mg/dL   Blood: x / Protein: Negative mg/dL / Nitrite: Negative   Leuk Esterase: Negative / RBC: 2 /HPF / WBC 0 /HPF   Sq Epi: x / Non Sq Epi: x / Bacteria: Negative /HPF        COVID-19 PCR: Manolo (23 @ 09:45)    RADIOLOGY & ADDITIONAL TESTS:    Care Discussed with Consultants/Other Providers:    Patient is a 89y old  Female who presents with a chief complaint of Status Post Fall with Right Hip Pain (03 Sep 2023 10:21)    Patient seen and examined at bedside.  Pt appearing calmer and redirectable this am.     ALLERGIES:  hydrochlorothiazide (Anaphylaxis; Angioedema)  cefuroxime (Dystonic RXN)  levofloxacin (Flushing)  sulfamethoxazole (Unknown)    MEDICATIONS  (STANDING):  amLODIPine   Tablet 5 milliGRAM(s) Oral daily  aspirin  chewable 81 milliGRAM(s) Oral two times a day  atorvastatin 40 milliGRAM(s) Oral at bedtime  levothyroxine 75 MICROGram(s) Oral daily  metoprolol tartrate 25 milliGRAM(s) Oral every 12 hours  mirtazapine 7.5 milliGRAM(s) Oral at bedtime  pantoprazole    Tablet 40 milliGRAM(s) Oral before breakfast  QUEtiapine 25 milliGRAM(s) Oral at bedtime  sodium chloride 0.9%. 1000 milliLiter(s) (75 mL/Hr) IV Continuous <Continuous>  tamsulosin 0.4 milliGRAM(s) Oral at bedtime    MEDICATIONS  (PRN):  acetaminophen     Tablet .. 650 milliGRAM(s) Oral every 6 hours PRN Temp greater or equal to 38C (100.4F), Mild Pain (1 - 3)  aluminum hydroxide/magnesium hydroxide/simethicone Suspension 30 milliLiter(s) Oral every 4 hours PRN Dyspepsia  melatonin 3 milliGRAM(s) Oral at bedtime PRN Insomnia  morphine  - Injectable 2 milliGRAM(s) IV Push every 4 hours PRN Moderate Pain (4 - 6)  ondansetron Injectable 4 milliGRAM(s) IV Push every 8 hours PRN Nausea and/or Vomiting    Vital Signs Last 24 Hrs  T(F): 99.1 (03 Sep 2023 21:00), Max: 99.1 (03 Sep 2023 21:00)  HR: 109 (04 Sep 2023 04:38) (84 - 109)  BP: 131/67 (04 Sep 2023 04:38) (120/66 - 131/67)  RR: 17 (03 Sep 2023 21:00) (17 - 17)  SpO2: 96% (03 Sep 2023 21:00) (96% - 96%)  I&O's Summary    02 Sep 2023 07:01  -  03 Sep 2023 07:00  --------------------------------------------------------  IN: 0 mL / OUT: 350 mL / NET: -350 mL      PHYSICAL EXAM:  General: 89 y/o female in NAD, A/O x 1  ENT: MMM  Neck: Supple, No JVD  Lungs: Clear to auscultation bilaterally, Non labored breathing   Cardio: RRR, S1/S2, No murmurs  Abdomen: Soft, Nontender, Nondistended; Bowel sounds present  Extremities: No calf tenderness, Right hip wlth large ecchymotic  area dressing- blood stained- wound still with oozing      LABS:                        9.4    11.74 )-----------( 140      ( 03 Sep 2023 05:26 )             28.2         140  |  107  |  19  ----------------------------<  111  3.4   |  24  |  0.97    Ca    8.1      03 Sep 2023 05:26                                  Urinalysis Basic - ( 03 Sep 2023 11:13 )    Color: Yellow / Appearance: Clear / S.015 / pH: x  Gluc: x / Ketone: Trace mg/dL  / Bili: Negative / Urobili: 1.0 mg/dL   Blood: x / Protein: Negative mg/dL / Nitrite: Negative   Leuk Esterase: Negative / RBC: 2 /HPF / WBC 0 /HPF   Sq Epi: x / Non Sq Epi: x / Bacteria: Negative /HPF        COVID-19 PCR: Manolo (23 @ 09:45)    RADIOLOGY & ADDITIONAL TESTS:    Care Discussed with Consultants/Other Providers:

## 2023-09-05 ENCOUNTER — TRANSCRIPTION ENCOUNTER (OUTPATIENT)
Age: 88
End: 2023-09-05

## 2023-09-05 VITALS
SYSTOLIC BLOOD PRESSURE: 135 MMHG | OXYGEN SATURATION: 96 % | HEART RATE: 98 BPM | RESPIRATION RATE: 15 BRPM | DIASTOLIC BLOOD PRESSURE: 78 MMHG | TEMPERATURE: 99 F

## 2023-09-05 LAB
A1C WITH ESTIMATED AVERAGE GLUCOSE RESULT: 5.7 % — HIGH (ref 4–5.6)
ANION GAP SERPL CALC-SCNC: 4 MMOL/L — LOW (ref 5–17)
BUN SERPL-MCNC: 23 MG/DL — SIGNIFICANT CHANGE UP (ref 7–23)
CALCIUM SERPL-MCNC: 8 MG/DL — LOW (ref 8.4–10.5)
CHLORIDE SERPL-SCNC: 108 MMOL/L — SIGNIFICANT CHANGE UP (ref 96–108)
CO2 SERPL-SCNC: 26 MMOL/L — SIGNIFICANT CHANGE UP (ref 22–31)
CREAT SERPL-MCNC: 0.92 MG/DL — SIGNIFICANT CHANGE UP (ref 0.5–1.3)
EGFR: 57 ML/MIN/1.73M2 — LOW
ESTIMATED AVERAGE GLUCOSE: 117 MG/DL — HIGH (ref 68–114)
GLUCOSE SERPL-MCNC: 101 MG/DL — HIGH (ref 70–99)
HCT VFR BLD CALC: 26.9 % — LOW (ref 34.5–45)
HGB BLD-MCNC: 8.7 G/DL — LOW (ref 11.5–15.5)
MCHC RBC-ENTMCNC: 30.2 PG — SIGNIFICANT CHANGE UP (ref 27–34)
MCHC RBC-ENTMCNC: 32.3 GM/DL — SIGNIFICANT CHANGE UP (ref 32–36)
MCV RBC AUTO: 93.4 FL — SIGNIFICANT CHANGE UP (ref 80–100)
NRBC # BLD: 0 /100 WBCS — SIGNIFICANT CHANGE UP (ref 0–0)
PLATELET # BLD AUTO: 159 K/UL — SIGNIFICANT CHANGE UP (ref 150–400)
POTASSIUM SERPL-MCNC: 3.7 MMOL/L — SIGNIFICANT CHANGE UP (ref 3.5–5.3)
POTASSIUM SERPL-SCNC: 3.7 MMOL/L — SIGNIFICANT CHANGE UP (ref 3.5–5.3)
RBC # BLD: 2.88 M/UL — LOW (ref 3.8–5.2)
RBC # FLD: 16.3 % — HIGH (ref 10.3–14.5)
SODIUM SERPL-SCNC: 138 MMOL/L — SIGNIFICANT CHANGE UP (ref 135–145)
WBC # BLD: 9.09 K/UL — SIGNIFICANT CHANGE UP (ref 3.8–10.5)
WBC # FLD AUTO: 9.09 K/UL — SIGNIFICANT CHANGE UP (ref 3.8–10.5)

## 2023-09-05 PROCEDURE — 97166 OT EVAL MOD COMPLEX 45 MIN: CPT

## 2023-09-05 PROCEDURE — 96374 THER/PROPH/DIAG INJ IV PUSH: CPT

## 2023-09-05 PROCEDURE — C1889: CPT

## 2023-09-05 PROCEDURE — 87635 SARS-COV-2 COVID-19 AMP PRB: CPT

## 2023-09-05 PROCEDURE — 81001 URINALYSIS AUTO W/SCOPE: CPT

## 2023-09-05 PROCEDURE — C1713: CPT

## 2023-09-05 PROCEDURE — 36415 COLL VENOUS BLD VENIPUNCTURE: CPT

## 2023-09-05 PROCEDURE — 73552 X-RAY EXAM OF FEMUR 2/>: CPT

## 2023-09-05 PROCEDURE — 93005 ELECTROCARDIOGRAM TRACING: CPT

## 2023-09-05 PROCEDURE — 76000 FLUOROSCOPY <1 HR PHYS/QHP: CPT

## 2023-09-05 PROCEDURE — 86900 BLOOD TYPING SEROLOGIC ABO: CPT

## 2023-09-05 PROCEDURE — 97530 THERAPEUTIC ACTIVITIES: CPT

## 2023-09-05 PROCEDURE — 36430 TRANSFUSION BLD/BLD COMPNT: CPT

## 2023-09-05 PROCEDURE — P9016: CPT

## 2023-09-05 PROCEDURE — 72170 X-RAY EXAM OF PELVIS: CPT

## 2023-09-05 PROCEDURE — 85027 COMPLETE CBC AUTOMATED: CPT

## 2023-09-05 PROCEDURE — 84484 ASSAY OF TROPONIN QUANT: CPT

## 2023-09-05 PROCEDURE — 85610 PROTHROMBIN TIME: CPT

## 2023-09-05 PROCEDURE — 93306 TTE W/DOPPLER COMPLETE: CPT

## 2023-09-05 PROCEDURE — 97110 THERAPEUTIC EXERCISES: CPT

## 2023-09-05 PROCEDURE — 96375 TX/PRO/DX INJ NEW DRUG ADDON: CPT

## 2023-09-05 PROCEDURE — 70450 CT HEAD/BRAIN W/O DYE: CPT

## 2023-09-05 PROCEDURE — 83036 HEMOGLOBIN GLYCOSYLATED A1C: CPT

## 2023-09-05 PROCEDURE — 73700 CT LOWER EXTREMITY W/O DYE: CPT

## 2023-09-05 PROCEDURE — 71045 X-RAY EXAM CHEST 1 VIEW: CPT

## 2023-09-05 PROCEDURE — 86923 COMPATIBILITY TEST ELECTRIC: CPT

## 2023-09-05 PROCEDURE — 80053 COMPREHEN METABOLIC PANEL: CPT

## 2023-09-05 PROCEDURE — 99239 HOSP IP/OBS DSCHRG MGMT >30: CPT

## 2023-09-05 PROCEDURE — 99285 EMERGENCY DEPT VISIT HI MDM: CPT | Mod: 25

## 2023-09-05 PROCEDURE — 80048 BASIC METABOLIC PNL TOTAL CA: CPT

## 2023-09-05 PROCEDURE — 85025 COMPLETE CBC W/AUTO DIFF WBC: CPT

## 2023-09-05 PROCEDURE — 86850 RBC ANTIBODY SCREEN: CPT

## 2023-09-05 PROCEDURE — 86901 BLOOD TYPING SEROLOGIC RH(D): CPT

## 2023-09-05 PROCEDURE — 97162 PT EVAL MOD COMPLEX 30 MIN: CPT

## 2023-09-05 RX ORDER — ASPIRIN/CALCIUM CARB/MAGNESIUM 324 MG
1 TABLET ORAL
Qty: 0 | Refills: 0 | DISCHARGE
Start: 2023-09-05

## 2023-09-05 RX ADMIN — Medication 650 MILLIGRAM(S): at 10:53

## 2023-09-05 RX ADMIN — PANTOPRAZOLE SODIUM 40 MILLIGRAM(S): 20 TABLET, DELAYED RELEASE ORAL at 05:21

## 2023-09-05 RX ADMIN — Medication 81 MILLIGRAM(S): at 17:24

## 2023-09-05 RX ADMIN — Medication 25 MILLIGRAM(S): at 17:25

## 2023-09-05 RX ADMIN — Medication 25 MILLIGRAM(S): at 05:22

## 2023-09-05 RX ADMIN — AMLODIPINE BESYLATE 5 MILLIGRAM(S): 2.5 TABLET ORAL at 05:22

## 2023-09-05 RX ADMIN — Medication 81 MILLIGRAM(S): at 05:22

## 2023-09-05 RX ADMIN — Medication 75 MICROGRAM(S): at 05:22

## 2023-09-05 NOTE — DISCHARGE NOTE PROVIDER - HOSPITAL COURSE
Hospital Course  89 year old female with past medical history of CAD s/p PCI (2008), grade II diastolic dysfunction with EF 55-60% (Echo 10/2021), valvular disease (mod-severe MR, mod TR), mod pHTN, HTN, HLD, hypothyroidism, CKD, urinary retention, hiatal hernia, remote history of adenocarcinoma of rectum s/p chemo/radiation (>25 years ago), depression, who was brought in from s/p fall with right hip pain.  History obtained from patient and family at bedside.She has been in her usual state of health, this morning she was walking to the kitchen when her legs gave out and she fell on her right side. She denies head trauma/LOC. She denies LH/dizziness/chest pain/palpitation/SOB prior to fall. She was unable to get up due to right hip pain and was brought in for evaluation.  She denies recent illness, no fever/chills, rest of ROS is negative.    In the ED, she was afebrile and hemodynamically stable.  Labs were otherwise unremarkable, anemia/Cr at baseline.  Xray showed right Intertrochanteric fracture with angulation at the fracture site noted with avulsed lesser trochanteric fragment.  She was given pain control and antiemetics.    Ortho was consulted, pending recommendations.    Patient lives at home alone, at baseline she ambulates with a cane for long distances. She is independent with her ADL's. She has METS <4. Denies chest pain/palpitation/SOB with exertion. She follows with Dr. Foote (cardiology) and had stress test done ~1 year ago which reportedly was unremarkable.   She had general anesthesia in the past without adverse reactions. No history of bleeding disorder.    Patient admitted to the hospital on 8/31 with a right intertrochanteric fracture status post fall, underwent successful ORIF with ortho Dr. Crowell. Course complicated by acute blood loss likely due to post op wound bleeding, pt was transfused two units PRBCs on 9/1.  Repeat imaging of the hip on 9/4 showed stable ORIF, with ill defined myofascial and subcutaneous soft tissue edema and hematoma without drainable collection or masses.  Pt continued to improve, now ortho and medically stable.  PT recommends [facility].  Discussion had with family who opted for [facility].  Medically cleared.     Source of Infection:  Antibiotic / Last Day: Na    Palliative Care / Advanced Care Planning  Code Status: DNR/DNI  Patient/Family agreeable to Hospice/Palliative (Y/N)?  Summary of Goals of Care Conversation:    Discharging Provider:  Christopher Licona NP  Contact Info: Cell [phone] - Please call with any questions or concerns.    Outpatient Provider: Dr. Bing Polo

## 2023-09-05 NOTE — DISCHARGE NOTE PROVIDER - NSDCFUSCHEDAPPT_GEN_ALL_CORE_FT
Mark Foote  [facility] Physician [facility]  CARDIOLOGY [address]  Scheduled Appointment: 11/13/2023

## 2023-09-05 NOTE — PROGRESS NOTE ADULT - ASSESSMENT
89yo female w. 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    *Right Intertrochanteric Fracture s/p Fall  *confusion/psychosis  *Leukocytosis- possible reactive - no fevers noted   Status post ORIF Right Hip POD 5, Ortho following  WBAT right leg, ASA 81 BID for DVT PPx  Pain management, avoid narcotics 2/2 confusion/dementia  CT RT Hip 9/4 --> status post ORIF RT femur fracture, regional ill defined myofascial and subcutaneous soft tissue edema and hematoma, no drainable collection/mass  PT recommends DANICA when able  Continue bladder scans q 6 hours  Continue seroquel 12.5 mg q am and 25 mg qhs  If no improvement in mental status consider psych consult      *Acute Blood loss Anemia  2/2 Post-op wound bleeding  Status post two units PRBCs on 9/1  Continue to monitor    *CAD s/p PCI/Grade II Diastolic Dysfunction with EF 55-60%  *HTN/HLD/Valvular Disease (Mod-Severe MR, Mod TR)/Mod pHTN  -EKG showed new LBBB compared to previous EKG in 2021, trop negative x1 and patient has no cardiac complaints  -Repeat TTE as above  -Appreciate Cardiology clearance  -Discussed with surgery team, okay to continue ASA  -Continue home meds: Metoprolol, Amlodipine, Statin     *Hypothyroidism  -Continue Levothyroxine    *CKD  -Baseline Cr 0.9-1.1 range, stable  -Avoid nephrotoxic agents    *Urinary Retention  -Continue Tamsulosin  -Monitor for s/s retention, bladder scan     *Hiatal Hernia  -Continue PPI     *Depression  *Dementia  monitor for agitation  -Continue mirtazapine    *DVT PPx  -asa 81mg for DVT proph     GOC/Code Status: DNR/DNI  review MOLST d/w Family  Dispo: Plan for DANICA when medically ready  family at bedside - Family-Lon, Son [phone] and NEELAM Braswell [phone] - CM notified   Family concerned about moms increased confusion and agitation    89yo female w. 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    *Right Intertrochanteric Fracture s/p Fall  *confusion/psychosis  *Leukocytosis- possible reactive - no fevers noted   Status post ORIF Right Hip POD 5, Ortho following  WBAT right leg, ASA 81 BID for DVT PPx  Pain management, avoid narcotics 2/2 confusion/dementia  CT RT Hip 9/4 --> status post ORIF RT femur fracture, regional ill defined myofascial and subcutaneous soft tissue edema and hematoma, no drainable collection/mass  PT recommends DANICA when able  Continue bladder scans q 6 hours  Continue seroquel 12.5 mg q am and 25 mg qhs  If no improvement in mental status consider psych consult    *Acute Blood loss Anemia  2/2 Post-op wound bleeding  Status post two units PRBCs on 9/1  Continue to monitor    *CAD s/p PCI/Grade II Diastolic Dysfunction with EF 55-60%  *HTN/HLD/Valvular Disease (Mod-Severe MR, Mod TR)/Mod pHTN  -EKG showed new LBBB compared to previous EKG in 2021, trop negative x1 and patient has no cardiac complaints  -Repeat TTE as above  -Appreciate Cardiology clearance  -Discussed with surgery team, okay to continue ASA  -Continue home meds: Metoprolol, Amlodipine, Statin     *Hypothyroidism  -Continue Levothyroxine    *CKD  -Baseline Cr 0.9-1.1 range, stable  -Avoid nephrotoxic agents    *Urinary Retention  -Continue Tamsulosin  -Monitor for s/s retention, bladder scan     *Hiatal Hernia  -Continue PPI     *Depression  *Dementia  -monitor for agitation  -Continue mirtazapine    *DVT PPx  -asa 81mg BID for DVT proph     GOC/Code Status: DNR/DNI review MOLST d/w Family  Dispo: Plan for DANICA when medically ready  family at bedside - Lon, Son [phone] and MichaelleNEELAM rich [phone]

## 2023-09-05 NOTE — PROGRESS NOTE ADULT - NS ATTEND AMEND GEN_ALL_CORE FT
Medically optimized for discharge to [facility]. Cleared for discharge from ortho perspective on ASA 81 BID.
Monitor patient's mental status closely, appears to be hospital induced given her age, postop state. Hb improved. PT flower.
Patient was seen and examined at bedside on 9/1/23.  Management of acute blood loss anemia postop with 2 u pRBCs. Ortho team evaluating dressing site for bleeding. OT/PT.
Patient was seen and examined at bedside on 9/3/23.  Monitor patient's mental status closely, appears to be hospital induced given her age, postop state. PT eval.  Monitor dressing site for bleeding.
Monitor patient's mental status closely, appears to be hospital induced given her age, postop state. Mental status improving.   Monitoring dressing site for bleeding; obtained CT 2/2 concern for oozing. Discussed with surgical PA, recs noted.  PT eval, planning for DANICA when otherwise medically optimized.

## 2023-09-05 NOTE — DISCHARGE NOTE NURSING/CASE MANAGEMENT/SOCIAL WORK - NSDCPEFALRISK_GEN_ALL_CORE
For information on Fall & Injury Prevention, visit: [email or web address] OR  [email or web address] OR  https://www.cdc.gov/steadi/patient.html

## 2023-09-05 NOTE — DISCHARGE NOTE PROVIDER - ATTENDING DISCHARGE PHYSICAL EXAMINATION:
Patient was seen and examined on 9/5/23, the date of discharge.    General: NAD, Alert  Neck: Supple  Lungs: Clear to auscultation bilaterally, non labored breathing  Cardio: RRR, S1/S2  Abdomen: Soft, Nontender, Nondistended; Bowel sounds present  Extremities: Right hip with ecchymosis, dressing in place    See VS in EMR

## 2023-09-05 NOTE — DISCHARGE NOTE PROVIDER - NSDCCPCAREPLAN_GEN_ALL_CORE_FT
PRINCIPAL DISCHARGE DIAGNOSIS  Diagnosis: Intertrochanteric fracture of right hip  Assessment and Plan of Treatment: you underwent successful surgery and are now cleared for rehab

## 2023-09-05 NOTE — PROGRESS NOTE ADULT - ASSESSMENT
89F with PMHx of CADs/PCI 2008, Valvular disease (MR, TR), CKD, now POD#5 s/p ORIF Right hip w/ nail, stable.  - H/H stable  - RLE WBAT  - Analgesics PRN  - PT reccs - DANICA  - ASA 81 mg bid for DVT prophylaxis  Plan pending discussion with Dr. Crowell 89F with PMHx of CADs/PCI 2008, Valvular disease (MR, TR), CKD, now POD#5 s/p ORIF Right hip w/ nail, stable.  - H/H stable  - RLE WBAT  - Analgesics PRN  - PT reccs - DANICA  - ASA 81 mg bid for DVT prophylaxis  - Medical management and supportive care as per primary team  - Okay to be discharged from ortho standpoint  Plan pending discussion with Dr. Crowell

## 2023-09-05 NOTE — PROGRESS NOTE ADULT - REASON FOR ADMISSION
Status Post Fall with Right Hip Pain

## 2023-09-05 NOTE — PROGRESS NOTE ADULT - SUBJECTIVE AND OBJECTIVE BOX
Patient is a 89y old  Female who presents with a chief complaint of Status Post Fall with Right Hip Pain (04 Sep 2023 19:23)    Patient seen and examined at bedside.  no acute events overnight    ALLERGIES:  hydrochlorothiazide (Anaphylaxis; Angioedema)  cefuroxime (Dystonic RXN)  levofloxacin (Flushing)  sulfamethoxazole (Unknown)        Vital Signs Last 24 Hrs  T(F): 98.4 (05 Sep 2023 05:18), Max: 98.8 (04 Sep 2023 20:00)  HR: 89 (05 Sep 2023 05:18) (89 - 104)  BP: 120/61 (05 Sep 2023 05:18) (120/61 - 128/64)  RR: 14 (05 Sep 2023 05:18) (14 - 18)  SpO2: 95% (05 Sep 2023 05:18) (95% - 95%)  I&O's Summary    04 Sep 2023 07:01  -  05 Sep 2023 07:00  --------------------------------------------------------  IN: 0 mL / OUT: 100 mL / NET: -100 mL      MEDICATIONS:  acetaminophen     Tablet .. 650 milliGRAM(s) Oral every 6 hours PRN  aluminum hydroxide/magnesium hydroxide/simethicone Suspension 30 milliLiter(s) Oral every 4 hours PRN  amLODIPine   Tablet 5 milliGRAM(s) Oral daily  aspirin  chewable 81 milliGRAM(s) Oral two times a day  atorvastatin 40 milliGRAM(s) Oral at bedtime  levothyroxine 75 MICROGram(s) Oral daily  melatonin 3 milliGRAM(s) Oral at bedtime PRN  metoprolol tartrate 25 milliGRAM(s) Oral every 12 hours  mirtazapine 7.5 milliGRAM(s) Oral at bedtime  ondansetron Injectable 4 milliGRAM(s) IV Push every 8 hours PRN  pantoprazole    Tablet 40 milliGRAM(s) Oral before breakfast  QUEtiapine 25 milliGRAM(s) Oral at bedtime  tamsulosin 0.4 milliGRAM(s) Oral at bedtime      PHYSICAL EXAM:  General: NAD, Alert but confused  ENT: MMM, no oral thrush  Neck: Supple, No JVD  Lungs: Clear to auscultation bilaterally, non labored breathing  Cardio: RRR, S1/S2, No murmurs  Abdomen: Soft, Nontender, Nondistended; Bowel sounds present  Extremities: No cyanosis, RT hip with echymosis, dressing in place    LABS:                        8.7    9.09  )-----------( 159      ( 05 Sep 2023 06:00 )             26.9     09-05    138  |  108  |  23  ----------------------------<  101  3.7   |  26  |  0.92    Ca    8.0      05 Sep 2023 06:00                                  Urinalysis Basic - ( 05 Sep 2023 06:00 )    Color: x / Appearance: x / SG: x / pH: x  Gluc: 101 mg/dL / Ketone: x  / Bili: x / Urobili: x   Blood: x / Protein: x / Nitrite: x   Leuk Esterase: x / RBC: x / WBC x   Sq Epi: x / Non Sq Epi: x / Bacteria: x        COVID-19 PCR: NotDetec (08-31-23 @ 09:45)      RADIOLOGY & ADDITIONAL TESTS:    Care Discussed with Consultants/Other Providers:    Patient is a 89y old  Female who presents with a chief complaint of Status Post Fall with Right Hip Pain (04 Sep 2023 19:23)    Patient seen and examined at bedside.  no acute events overnight    ALLERGIES:  hydrochlorothiazide (Anaphylaxis; Angioedema)  cefuroxime (Dystonic RXN)  levofloxacin (Flushing)  sulfamethoxazole (Unknown)    Vital Signs Last 24 Hrs  T(F): 98.4 (05 Sep 2023 05:18), Max: 98.8 (04 Sep 2023 20:00)  HR: 89 (05 Sep 2023 05:18) (89 - 104)  BP: 120/61 (05 Sep 2023 05:18) (120/61 - 128/64)  RR: 14 (05 Sep 2023 05:18) (14 - 18)  SpO2: 95% (05 Sep 2023 05:18) (95% - 95%)    I&O's Summary    04 Sep 2023 07:01  -  05 Sep 2023 07:00  --------------------------------------------------------  IN: 0 mL / OUT: 100 mL / NET: -100 mL      MEDICATIONS:  acetaminophen     Tablet .. 650 milliGRAM(s) Oral every 6 hours PRN  aluminum hydroxide/magnesium hydroxide/simethicone Suspension 30 milliLiter(s) Oral every 4 hours PRN  amLODIPine   Tablet 5 milliGRAM(s) Oral daily  aspirin  chewable 81 milliGRAM(s) Oral two times a day  atorvastatin 40 milliGRAM(s) Oral at bedtime  levothyroxine 75 MICROGram(s) Oral daily  melatonin 3 milliGRAM(s) Oral at bedtime PRN  metoprolol tartrate 25 milliGRAM(s) Oral every 12 hours  mirtazapine 7.5 milliGRAM(s) Oral at bedtime  ondansetron Injectable 4 milliGRAM(s) IV Push every 8 hours PRN  pantoprazole    Tablet 40 milliGRAM(s) Oral before breakfast  QUEtiapine 25 milliGRAM(s) Oral at bedtime  tamsulosin 0.4 milliGRAM(s) Oral at bedtime      PHYSICAL EXAM:  General: NAD, Alert but confused  ENT: MMM, no oral thrush  Neck: Supple, No JVD  Lungs: Clear to auscultation bilaterally, non labored breathing  Cardio: RRR, S1/S2, No murmurs  Abdomen: Soft, Nontender, Nondistended; Bowel sounds present  Extremities: No cyanosis, RT hip with echymosis, dressing in place    LABS:                        8.7    9.09  )-----------( 159      ( 05 Sep 2023 06:00 )             26.9     09-05    138  |  108  |  23  ----------------------------<  101  3.7   |  26  |  0.92    Ca    8.0      05 Sep 2023 06:00            Urinalysis Basic - ( 05 Sep 2023 06:00 )    Color: x / Appearance: x / SG: x / pH: x  Gluc: 101 mg/dL / Ketone: x  / Bili: x / Urobili: x   Blood: x / Protein: x / Nitrite: x   Leuk Esterase: x / RBC: x / WBC x   Sq Epi: x / Non Sq Epi: x / Bacteria: x        COVID-19 PCR: NotDetec (08-31-23 @ 09:45)      RADIOLOGY & ADDITIONAL TESTS:    Care Discussed with Consultants/Other Providers:

## 2023-09-05 NOTE — DISCHARGE NOTE PROVIDER - CARE PROVIDERS DIRECT ADDRESSES
[email or web address]

## 2023-09-05 NOTE — DISCHARGE NOTE PROVIDER - NSDCMRMEDTOKEN_GEN_ALL_CORE_FT
amLODIPine 5 mg oral tablet: 1 tab(s) orally once a day  aspirin 81 mg oral tablet, chewable: 1 tab(s) orally 2 times a day  atorvastatin 40 mg oral tablet: 1 tab(s) orally once a day (at bedtime)  levothyroxine 75 mcg (0.075 mg) oral tablet: 1 tab(s) orally once a day  Metoprolol Tartrate 25 mg oral tablet: 1 tab(s) orally 2 times a day  mirtazapine 7.5 mg oral tablet: 1 tab(s) orally once a day (at bedtime)  Multiple Vitamins oral tablet: 1 tab(s) orally once a day  pantoprazole 40 mg oral delayed release tablet: 1 tab(s) orally once a day (before a meal)  tamsulosin 0.4 mg oral capsule: 1 cap(s) orally once a day (at bedtime)

## 2023-09-05 NOTE — DISCHARGE NOTE PROVIDER - CARE PROVIDER_API CALL
Bing Polo  [facility] Medicine  [address] [place] [address]  Phone: [phone]  Fax: [phone]  Follow Up Time:     Chevy Crowell  Orthopaedic Surgery  [facility], [address] [place] [address]  Phone: [phone]  Fax: [phone]  Follow Up Time:

## 2023-09-05 NOTE — PROGRESS NOTE ADULT - SUBJECTIVE AND OBJECTIVE BOX
INTERVAL HPI/OVERNIGHT EVENTS:  Pt seen and examined at bedside resting comfortably. No acute events reported overnight. Pt offers no complaints at this time, offers no complaints at this time. Pt denies any hip pain. Denies fever/chills, chest pain, dyspnea, cough, dizziness, numbness/tingling to RLE.     Vital Signs Last 24 Hrs  T(C): 36.9 (05 Sep 2023 05:18), Max: 37.1 (04 Sep 2023 20:00)  T(F): 98.4 (05 Sep 2023 05:18), Max: 98.8 (04 Sep 2023 20:00)  HR: 89 (05 Sep 2023 05:18) (89 - 104)  BP: 120/61 (05 Sep 2023 05:18) (120/61 - 128/64)  BP(mean): --  RR: 14 (05 Sep 2023 05:18) (14 - 18)  SpO2: 95% (05 Sep 2023 05:18) (95% - 95%)    Parameters below as of 05 Sep 2023 05:18  Patient On (Oxygen Delivery Method): room air        PHYSICAL EXAM:  GENERAL: awake and alert, NAD  HEAD: atraumatic, normocephalic  EYES: EOMI  CHEST/LUNG: non-labored breathing  HEART: normal HR  ABDOMEN: soft, NT, ND  EXTREMITIES: R hip soft, minimally tender to palpation, dressings C/D/I; pain with R knee flexion, limited ROM; RLE neurovascular intact, normal capillary refill; no calf tenderness b/l    I&O's Detail    04 Sep 2023 07:01  -  05 Sep 2023 07:00  --------------------------------------------------------  IN:  Total IN: 0 mL    OUT:    Voided (mL): 100 mL  Total OUT: 100 mL    Total NET: -100 mL          LABS:                        8.7    9.09  )-----------( 159      ( 05 Sep 2023 06:00 )             26.9     09-05    138  |  108  |  23  ----------------------------<  101<H>  3.7   |  26  |  0.92    Ca    8.0<L>      05 Sep 2023 06:00        Urinalysis Basic - ( 05 Sep 2023 06:00 )    Color: x / Appearance: x / SG: x / pH: x  Gluc: 101 mg/dL / Ketone: x  / Bili: x / Urobili: x   Blood: x / Protein: x / Nitrite: x   Leuk Esterase: x / RBC: x / WBC x   Sq Epi: x / Non Sq Epi: x / Bacteria: x INTERVAL HPI/OVERNIGHT EVENTS:  Pt seen and examined at bedside resting comfortably. No acute events reported overnight. Pt offers no complaints at this time, offers no complaints at this time. Pt denies any hip pain. Denies fever/chills, chest pain, dyspnea, cough, dizziness, numbness/tingling to RLE.     Vital Signs Last 24 Hrs  T(C): 36.9 (05 Sep 2023 05:18), Max: 37.1 (04 Sep 2023 20:00)  T(F): 98.4 (05 Sep 2023 05:18), Max: 98.8 (04 Sep 2023 20:00)  HR: 89 (05 Sep 2023 05:18) (89 - 104)  BP: 120/61 (05 Sep 2023 05:18) (120/61 - 128/64)  BP(mean): --  RR: 14 (05 Sep 2023 05:18) (14 - 18)  SpO2: 95% (05 Sep 2023 05:18) (95% - 95%)    Parameters below as of 05 Sep 2023 05:18  Patient On (Oxygen Delivery Method): room air        PHYSICAL EXAM:  GENERAL: awake and alert, NAD  HEAD: atraumatic, normocephalic  EYES: EOMI  CHEST/LUNG: non-labored breathing  HEART: normal HR  ABDOMEN: soft, NT, ND  EXTREMITIES: R hip soft, minimally tender to palpation, no hematoma noted, dressings C/D/I; bruising noted on R hip; pain with R knee flexion, limited ROM; RLE neurovascular intact, normal capillary refill; no calf tenderness b/l    I&O's Detail    04 Sep 2023 07:01  -  05 Sep 2023 07:00  --------------------------------------------------------  IN:  Total IN: 0 mL    OUT:    Voided (mL): 100 mL  Total OUT: 100 mL    Total NET: -100 mL          LABS:                        8.7    9.09  )-----------( 159      ( 05 Sep 2023 06:00 )             26.9     09-05    138  |  108  |  23  ----------------------------<  101<H>  3.7   |  26  |  0.92    Ca    8.0<L>      05 Sep 2023 06:00        Urinalysis Basic - ( 05 Sep 2023 06:00 )    Color: x / Appearance: x / SG: x / pH: x  Gluc: 101 mg/dL / Ketone: x  / Bili: x / Urobili: x   Blood: x / Protein: x / Nitrite: x   Leuk Esterase: x / RBC: x / WBC x   Sq Epi: x / Non Sq Epi: x / Bacteria: x

## 2023-09-05 NOTE — PROGRESS NOTE ADULT - PROVIDER SPECIALTY LIST ADULT
Critical Care
Orthopedics
Orthopedics
Critical Care
Hospitalist
Hospitalist
Orthopedics
Hospitalist
Orthopedics
Hospitalist
Hospitalist
Orthopedics

## 2023-09-05 NOTE — DISCHARGE NOTE NURSING/CASE MANAGEMENT/SOCIAL WORK - PATIENT PORTAL LINK FT
You can access the FollowMyHealth Patient Portal offered by [facility] by registering at the following website: [email or web address]. By joining [employer]’s FollowMyHealth portal, you will also be able to view your health information using other applications (apps) compatible with our system.

## 2023-09-21 NOTE — ED ADULT NURSE NOTE - CCCP TRG CHIEF CMPLNT
[address]  [address]    NAME: RANDOLPH PORTILLO/AGE/SEX: 1938 - 84 - M  PHYSICIAN: Dayanna Parra MD                                      ADMIT DATE: 23  UNIT #: [id number]                                                [id number]                                                                    LOC//BED: [id number]                                             PHYSICIAN REPORT                                           DISCHARGE SUMMARY                                         REPORT # : [id number]  Discharge Summary  Patient Seen and Examined By  Dayanna Parra MD    23 15:22  Primary care provider: Dr. Trevor Mullen  [facility] MRN: [id number]    Discharge Summary  History of present illness/Hospital course      History of present illness  This is a pleasant 84-year-old male with a past medical history significant for moderate dementia,  coronary artery disease, paroxysmal atrial fibrillation on anticoagulation who presents with  altered mental status and aphasia.  The patient was recently hospitalized from 2023 through  2023 for similar presentation.  The patient normally lives independently at home however his  family does check in on him often.  The patient was evaluated during last hospitalization for  altered mental status and inability to care for himself for several days.  The patient underwent a  neurological work-up including normal CT head and MRI of brain as well as normal urine analysis.  Work-up during that hospitalization was unyielding and no reversible etiology was found to explain  his altered mental status and confusion.  The patient ultimately returned back to baseline and was  discharged home.  Since discharge his son and daughter has been staying  with him 24 hours a day.  Initially the patient was doing well and was back to his baseline for the most part however 2 days  after returning home the patient was again noted to be confused.  The patient's son noted him to  have urinary frequency and was getting up multiple times the night prior to presentation.  The  morning of presentation the patient was having difficulty speaking and was incoherent.  Patient was  also noted to have unsteady gait.  EMS was summoned and the patient was brought to [facility]'s emergency room department for further evaluation and treatment.  The patient himself does  not recall how he got to the hospital or why he had to return.  CT head/CT angiography of head and  neck was unremarkable.  The patient was evaluated by telemetry neurology.  He was determined not to  be a TNK candidate however possible new onset seizures was in the diagnosis and the patient was  given a Keppra load in the emergency room department.  Neurology was placed on consultation and the  patient was admitted for further evaluation.  Of note, urine analysis in the emergency room  department did not suggest UTI.  The patient did not have any significant electrolyte derangements.  CBC also was unremarkable      Hospital course  The patient was admitted for altered mental status.  He also was having difficulty speaking and was  incoherent.  CT head/CT angiography of head and neck was unremarkable.  The patient was evaluated  by telemetry neurology.  He was determined not to be a TNK candidate however possible new onset  seizure was in the diagnosis and the patient was given a Keppra load in the emergency room  department.  Neurology was placed on consultation and the patient was admitted for further  treatment and evaluation.  Of note, urine analysis did not suggest UTI.  MRI of the brain and  brainstem was repeated on this hospitalization and did not show any acute findings.  EEG was also  performed and  did not show any seizure activity.  Neurology recommended the discontinuation of  Keppra.  Neurology suspects that altered mental status may be progression of underlying dementia.  During this hospitalization the patient was also mildly bradycardic.  The patient was bradycardic  during last hospitalization as well and therefore metoprolol was decreased at the time.  Due to  persistent bradycardia metoprolol was held altogether.  During last hospitalization which was  recent, echocardiogram was performed which showed preserved ejection fraction.  The patient was  seen by physical therapy and continue therapy services with home health at home was recommended.  Overall, the patient is returning back to his baseline mental status and is stable for discharge  back home under the care of his family members.      Physical exam performed on 9/21/2023 at 12:30 PM.    Vital Signs        Date Time  Temp Pulse Resp B/P (MAP) Pulse Ox O2 Delivery O2 Flow Rate FiO2    9/21/23 12:00 98.2 58 18 109/67 (81) 100    9/21/23 08:35      Room Air    General: Not in apparent distress, alert, oriented x3.  Extremely hard of hearing.  Cardiovascular: Regular rhythm but bradycardic, S1-S2 is normal, no murmurs, rubs, gallops.  Respiratory: Clear to auscultation bilaterally, no wheezes, no rhonchi's, no rales.  Gastrointestinal: Abdomen is soft, nontender, nondistended.  Bowel sounds are present in all 4  quadrants.  No gross organomegaly.  Extremities: No peripheral edema.  No calf tenderness.  Peripheral pulses are intact.  Neurological: No gross deficits.      Diagnostic Studies                                             Laboratory Tests      Test   9/21/23  12:38 9/21/23  05:24    Magnesium Level 2.2    White Blood Count  7.5    Red Blood Count  4.60    Hemoglobin  13.6    Hematocrit  40.7    Mean Corpuscular Volume  88.5    Mean Corpuscular Hemoglobin  29.7    Mean Corpuscular Hemoglobin  Concent   33.5      Red Cell Distribution Width   13.7    Platelet Count  237    Mean Platelet Volume  9.4    Neutrophils (%) (Auto)  66.2    Lymphocytes (%) (Auto)  21.8    Monocytes (%) (Auto)  8.3    Eosinophils (%) (Auto)  3.2    Basophils (%) (Auto)  0.5    Neutrophils # (Auto)  5.0    Lymphocytes # (Auto)  1.6    Monocytes # (Auto)  0.6    Eosinophils # (Auto)  0.2    Basophils # (Auto)  0.0    Sodium Level  138    Potassium Level  3.8    Chloride Level  108  H    Carbon Dioxide Level  23    Anion Gap  7    Blood Urea Nitrogen  12.0    Creatinine  0.91    Estimat Glomerular Filtration  Rate   > 60      BUN/Creatinine Ratio  13.2    Glucose Level  93    Calcium Level  8.6          CT head/CT angiography of head and neck  IMPRESSION:  UNENHANCED CT HEAD:  1. No definite evidence for intracranial bleed on head CT.  2. 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 No definite large vessel occlusion.  Neck CTA:  1. Severely limited evaluation due to patient motion. No definite large vessel occlusion is  demonstrated.  CT Perfusion:  1. There is penumbra volume of 77 mL visualized within bilateral cerebral hemispheres. There is  diffuse the abnormal transit time is less than bilateral cerebral hemispheres without specific  vascular territory. No perfusion evidence to indicate cerebral infarct, however these findings are  favored artifactual likely secondary to severe patient motion during the examination.      Chest x-ray  IMPRESSION:  1. No radiographic evidence of acute cardiopulmonary disease.      MRI of Brain  IMPRESSION:  1. No acute infarct.  2. Small chronic infarct in the right parietal lobe.  3. Mild white matter disease likely reflecting chronic microvascular ischemic changes.    Discharge diagnosis  Acute metabolic encephalopathy  -Patient was recently hospitalized for similar presentation and underwent neurological evaluation  without clear etiology to explain symptoms.  Infectious causes were also ruled out.   Ammonia and  TSH was within normal limits during last hospitalization.  -CT head/CT angiography of head and neck in the emergency room department did not show any acute fi  ndings.  -The patient was determined not to be a TNK candidate per telemetry neurology.  Patient was started  on a Keppra load per telemetry neurology for possible seizures.  -Neurology evaluated the patient during this hospitalization.  MRI of brain with contrast without  acute findings.  Per neurology EEG EEG did not have any seizure activities.  -Per neurology Keppra may be discontinued.  -Per PTOT patient may return home with home health and assistance is recommended.      Possible dementia  -Patient has been emotionally labile and has been for some time  -Son and daughter however has not seen any changes with his memory.  -It is questionable if the patient can return to independent living environment.  Patient will be  discharging back home with family assistance.  -Patient likely will benefit from neuropsychological testing in the outpatient setting.      Paroxysmal atrial fibrillation:  -Metoprolol has been placed on hold due to persistent bradycardia despite decreasing dose.  Continue Eliquis      Bradycardia  -Metoprolol was decreased to 12.5 mg during last hospitalization due to bradycardia.  Patient is  still bradycardic.  -EKG shows sinus bradycardia with occasional supraventricular premature complexes and QT/QTc  prolongation.  -Discontinue metoprolol for now.  -Discharge home with a 30-day event monitor.      Elevation of the total bilirubin:  -No elevation of the hepatocellular enzymes or alkaline phosphatase  -Unclear significance however was elevated during last hospitalization as well.  -Continue to monitor trend in the outpatient setting.      Hyperlipidemia:  Continue atorvastatin      Hypertension  -Metoprolol placed on hold for now due to persistent bradycardia.  -Continue lisinopril 5 mg daily (this was started during the  last hospitalization)      Coronary artery disease:  -Continue metoprolol and atorvastatin  -No symptoms of angina      BPH  -Continue Flomax      CODE STATUS  DNR.  Prearrest measures are acceptable.  This was discussed with daughter/healthcare power of   Faina at bedside.      I have discussed above findings and plan in detail with the patient's daughter/healthcare power of   Faina and granddaughter at bedside.  All questions were answered.  I Have also discussed  above case with Dr. Gresham from neurology.  I did spend greater than 30 minutes to prepare this  discharge.        Dayanna Parra MD                       Sep 21, 2023 15:32    <Electronically signed by Dayanna Parra MD> 09/21/23 1532        ______________________________________________  DRAFT UNTIL SIGNED      CC: Dayanna Parra MD;   vomiting

## 2023-10-09 ENCOUNTER — RX RENEWAL (OUTPATIENT)
Age: 88
End: 2023-10-09

## 2023-10-09 DIAGNOSIS — K21.9 GASTRO-ESOPHAGEAL REFLUX DISEASE W/OUT ESOPHAGITIS: ICD-10-CM

## 2023-11-06 RX ORDER — LISINOPRIL 10 MG/1
10 TABLET ORAL DAILY
Qty: 90 | Refills: 2 | Status: DISCONTINUED | COMMUNITY
Start: 2018-11-19 | End: 2023-11-06

## 2023-11-06 RX ORDER — AMLODIPINE BESYLATE 5 MG/1
5 TABLET ORAL
Qty: 90 | Refills: 3 | Status: DISCONTINUED | COMMUNITY
Start: 2019-10-29 | End: 2023-11-06

## 2023-11-10 ENCOUNTER — NON-APPOINTMENT (OUTPATIENT)
Age: 88
End: 2023-11-10

## 2023-11-13 ENCOUNTER — APPOINTMENT (OUTPATIENT)
Dept: CARDIOLOGY | Facility: CLINIC | Age: 88
End: 2023-11-13
Payer: MEDICARE

## 2023-11-13 ENCOUNTER — NON-APPOINTMENT (OUTPATIENT)
Age: 88
End: 2023-11-13

## 2023-11-13 ENCOUNTER — RX RENEWAL (OUTPATIENT)
Age: 88
End: 2023-11-13

## 2023-11-13 VITALS
DIASTOLIC BLOOD PRESSURE: 67 MMHG | WEIGHT: 111 LBS | HEART RATE: 94 BPM | OXYGEN SATURATION: 100 % | HEIGHT: 62 IN | BODY MASS INDEX: 20.43 KG/M2 | SYSTOLIC BLOOD PRESSURE: 102 MMHG

## 2023-11-13 PROCEDURE — 93000 ELECTROCARDIOGRAM COMPLETE: CPT

## 2023-11-13 PROCEDURE — 99214 OFFICE O/P EST MOD 30 MIN: CPT

## 2023-11-20 ENCOUNTER — APPOINTMENT (OUTPATIENT)
Dept: FAMILY MEDICINE | Facility: CLINIC | Age: 88
End: 2023-11-20

## 2023-11-20 ENCOUNTER — APPOINTMENT (OUTPATIENT)
Dept: FAMILY MEDICINE | Facility: CLINIC | Age: 88
End: 2023-11-20
Payer: MEDICARE

## 2023-11-20 VITALS
BODY MASS INDEX: 16.56 KG/M2 | TEMPERATURE: 97.9 F | HEART RATE: 98 BPM | WEIGHT: 90 LBS | HEIGHT: 62 IN | SYSTOLIC BLOOD PRESSURE: 92 MMHG | OXYGEN SATURATION: 99 % | DIASTOLIC BLOOD PRESSURE: 59 MMHG | RESPIRATION RATE: 16 BRPM

## 2023-11-20 DIAGNOSIS — R26.81 UNSTEADINESS ON FEET: ICD-10-CM

## 2023-11-20 DIAGNOSIS — S31.809A UNSPECIFIED OPEN WOUND OF UNSPECIFIED BUTTOCK, INITIAL ENCOUNTER: ICD-10-CM

## 2023-11-20 DIAGNOSIS — Z09 ENCOUNTER FOR FOLLOW-UP EXAMINATION AFTER COMPLETED TREATMENT FOR CONDITIONS OTHER THAN MALIGNANT NEOPLASM: ICD-10-CM

## 2023-11-20 DIAGNOSIS — E78.5 HYPERLIPIDEMIA, UNSPECIFIED: ICD-10-CM
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
## 2023-11-20 PROCEDURE — 99214 OFFICE O/P EST MOD 30 MIN: CPT

## 2023-11-22 NOTE — ED ADULT NURSE NOTE - NS ED NURSE REPORT GIVEN TO FT
Dr. Gaspar, from anesthesia, reviewed the patient's hx (including Pulmonary status) and said it was fine to proceed with the EGD/Colonoscopy.  
[place]

## 2023-12-10 ENCOUNTER — RX RENEWAL (OUTPATIENT)
Age: 88
End: 2023-12-10

## 2023-12-10 RX ORDER — PANTOPRAZOLE 40 MG/1
40 TABLET, DELAYED RELEASE ORAL
Qty: 90 | Refills: 3 | Status: ACTIVE | COMMUNITY
Start: 2021-10-21 | End: 1900-01-01

## 2023-12-20 ENCOUNTER — APPOINTMENT (OUTPATIENT)
Dept: FAMILY MEDICINE | Facility: CLINIC | Age: 88
End: 2023-12-20

## 2023-12-31 PROBLEM — Z23 NEED FOR 23-POLYVALENT PNEUMOCOCCAL POLYSACCHARIDE VACCINE: Status: RESOLVED | Noted: 2019-05-03 | Resolved: 2023-12-31

## 2024-01-11 DIAGNOSIS — R05.3 CHRONIC COUGH: ICD-10-CM

## 2024-03-05 ENCOUNTER — LABORATORY RESULT (OUTPATIENT)
Age: 89
End: 2024-03-05

## 2024-03-07 ENCOUNTER — APPOINTMENT (OUTPATIENT)
Dept: PULMONOLOGY | Facility: CLINIC | Age: 89
End: 2024-03-07
Payer: MEDICARE

## 2024-03-07 VITALS
WEIGHT: 115 LBS | OXYGEN SATURATION: 96 % | DIASTOLIC BLOOD PRESSURE: 75 MMHG | HEIGHT: 62 IN | SYSTOLIC BLOOD PRESSURE: 145 MMHG | BODY MASS INDEX: 21.16 KG/M2 | HEART RATE: 88 BPM

## 2024-03-07 DIAGNOSIS — R09.82 POSTNASAL DRIP: ICD-10-CM

## 2024-03-07 PROCEDURE — 71045 X-RAY EXAM CHEST 1 VIEW: CPT

## 2024-03-07 PROCEDURE — 99204 OFFICE O/P NEW MOD 45 MIN: CPT | Mod: 25

## 2024-03-07 PROCEDURE — 94010 BREATHING CAPACITY TEST: CPT

## 2024-03-07 RX ORDER — BLOOD-GLUCOSE METER
KIT MISCELLANEOUS
Qty: 1 | Refills: 0 | Status: ACTIVE | COMMUNITY
Start: 2024-03-07 | End: 1900-01-01

## 2024-03-07 RX ORDER — MIRTAZAPINE 15 MG/1
15 TABLET, FILM COATED ORAL
Qty: 90 | Refills: 3 | Status: ACTIVE | COMMUNITY
Start: 2019-10-11 | End: 1900-01-01

## 2024-03-07 RX ORDER — IPRATROPIUM BROMIDE 21 UG/1
0.03 SPRAY NASAL
Qty: 1 | Refills: 3 | Status: ACTIVE | COMMUNITY
Start: 2024-03-07 | End: 1900-01-01

## 2024-03-07 RX ORDER — SODIUM CHLORIDE FOR INHALATION 0.9 %
0.9 VIAL, NEBULIZER (ML) INHALATION TWICE DAILY
Qty: 60 | Refills: 6 | Status: ACTIVE | COMMUNITY
Start: 2024-03-07 | End: 1900-01-01

## 2024-03-07 NOTE — ASSESSMENT
[FreeTextEntry1] : given nasal congestion can trial nasal spray to help with cough  can add saline nebs bid to help loosen mucous.   fu 2 weeks.  A total of 45 minutes was spent on this encounter including history taking, chart review, physical examination, testing interpretation and treatment plan.

## 2024-03-07 NOTE — HISTORY OF PRESENT ILLNESS
[Former] : former [TextBox_4] : 89F HTN, HLD, hypothyroid   reports cough when she wakes up and when lying down at night.  Seems to have mucous she can't clear.  Mucinex has not helped.  Has been blowing her nose a lot lately.  No prior hx of asthma/copd, denies frequent lung infections or hospitalizations for resp illness.  Former light smoker.  [TextBox_13] : 40 [YearQuit] : 1984

## 2024-03-07 NOTE — PROCEDURE
[FreeTextEntry1] : audrey : normal  CXR 1 view: no focal consolidation, slight blunting of bilat costophrenic angles.

## 2024-03-28 NOTE — PHYSICAL THERAPY INITIAL EVALUATION ADULT - PHYSICAL ASSIST/NONPHYSICAL ASSIST: SUPINE/SIT, REHAB EVAL
"Received letter from patient that he is no longer on Bupropion SR \"This medication made me sick and stopped taking\". I removed this from his medication list. Patient attached a current med list which I reconciled with our med list.  Patient attached BP/HR log for the last 3 months with note that pulse rate always seems low. HR ranges from 44-69 bpm. Dr Razo decreased patient's Metoprolol on 3/14/24 office visit and holter monitor was completed and received. Holter monitor assigned to Dr Razo to review.  " 1 person assist

## 2024-04-29 ENCOUNTER — RX RENEWAL (OUTPATIENT)
Age: 89
End: 2024-04-29

## 2024-04-29 RX ORDER — TAMSULOSIN HYDROCHLORIDE 0.4 MG/1
0.4 CAPSULE ORAL
Qty: 90 | Refills: 3 | Status: ACTIVE | COMMUNITY
Start: 2019-10-11 | End: 1900-01-01

## 2024-05-13 ENCOUNTER — NON-APPOINTMENT (OUTPATIENT)
Age: 89
End: 2024-05-13

## 2024-05-13 ENCOUNTER — APPOINTMENT (OUTPATIENT)
Dept: CARDIOLOGY | Facility: CLINIC | Age: 89
End: 2024-05-13
Payer: MEDICARE

## 2024-05-13 VITALS
OXYGEN SATURATION: 96 % | SYSTOLIC BLOOD PRESSURE: 158 MMHG | HEIGHT: 62 IN | HEART RATE: 81 BPM | BODY MASS INDEX: 23.55 KG/M2 | DIASTOLIC BLOOD PRESSURE: 92 MMHG | WEIGHT: 128 LBS

## 2024-05-13 VITALS — SYSTOLIC BLOOD PRESSURE: 142 MMHG | DIASTOLIC BLOOD PRESSURE: 78 MMHG

## 2024-05-13 DIAGNOSIS — I25.10 ATHEROSCLEROTIC HEART DISEASE OF NATIVE CORONARY ARTERY W/OUT ANGINA PECTORIS: ICD-10-CM

## 2024-05-13 DIAGNOSIS — Z87.891 PERSONAL HISTORY OF NICOTINE DEPENDENCE: ICD-10-CM

## 2024-05-13 DIAGNOSIS — I49.8 OTHER SPECIFIED CARDIAC ARRHYTHMIAS: ICD-10-CM

## 2024-05-13 PROCEDURE — 99214 OFFICE O/P EST MOD 30 MIN: CPT

## 2024-05-13 PROCEDURE — 93000 ELECTROCARDIOGRAM COMPLETE: CPT

## 2024-05-13 NOTE — HISTORY OF PRESENT ILLNESS
[FreeTextEntry1] : Seen with daughter who provides history.    She denies chest pain dyspnea or palpitations.  She is at home with an aide as well as family caring for her.  Daughter reports home blood pressure measurements have been good patient not currently on blood pressure medications previously had been low.  Patient eats well has 3 meals a day has no complaint

## 2024-05-13 NOTE — REASON FOR VISIT
[CV Risk Factors and Non-Cardiac Disease] : CV risk factors and non-cardiac disease [Arrhythmia/ECG Abnorrmalities] : arrhythmia/ECG abnormalities [Hypertension] : hypertension [Coronary Artery Disease] : coronary artery disease [Family Member] : family member [Other: _____] : [unfilled] [FreeTextEntry3] : Christ

## 2024-05-13 NOTE — CARDIOLOGY SUMMARY
[de-identified] : 11/21/22 sinus ivcd May 2017, 2023 sinus tachycardia with PVC ECG November 13, 2023 sinus with APCs VPCs May 13, 2024 sinus PVC LBBB    [de-identified] : 2013 mild anterolateral ischemia [de-identified] : 2020 normal LV function mitral and tricuspid regurg  October 2021 biatrial enlargement restrictive physiology moderate to severe mitral regurgitation normal LV systolic function pseudonormal diastolic function  August 2023 EF 50 to 55% mild MR normal estimated PA pressure grade 1 diastolic dysfunction [de-identified] : 2014 moderate nonobstructive disease 2008 LAD stent

## 2024-05-13 NOTE — ASSESSMENT
[FreeTextEntry1] : Status post hip fracture and pinning.  Reported ASHD.    GI bleed reported when taking aspirin.    Asymptomatic APCs and VPCs.  LBBB conduction system disease advanced age.

## 2024-05-13 NOTE — DISCUSSION/SUMMARY
[Patient] : the patient [Risks] : risks [Benefits] : benefits [Alternatives] : alternatives [___ Month(s)] : in [unfilled] month(s) [FreeTextEntry1] : Given her asymptomatic status and relatively low blood pressure history of fall with hip fracture will remain off of beta-blocker and any other antihypertensives at this time follow-up medically with Dr. Polo, may see me 6 months and as needed.  Discussed with patient and daughter. [EKG obtained to assist in diagnosis and management of assessed problem(s)] : EKG obtained to assist in diagnosis and management of assessed problem(s)

## 2024-05-13 NOTE — PHYSICAL EXAM
[Well Developed] : well developed [Well Nourished] : well nourished [No Acute Distress] : no acute distress [Normal Conjunctiva] : normal conjunctiva [Normal Venous Pressure] : normal venous pressure [No Carotid Bruit] : no carotid bruit [Normal S1, S2] : normal S1, S2 [No Murmur] : no murmur [No Rub] : no rub [No Gallop] : no gallop [Clear Lung Fields] : clear lung fields [Good Air Entry] : good air entry [No Respiratory Distress] : no respiratory distress  [No Cyanosis] : no cyanosis [de-identified] : Minimal edema left lower extremity [de-identified] : Didn't recall her age correctly.

## 2024-05-23 NOTE — DISCHARGE NOTE NURSING/CASE MANAGEMENT/SOCIAL WORK - NSCORESITESY/N_GEN_A_CORE_RD
Medication:   olmesartan (BENICAR) 40 MG tablet 90 tablet 0 2/15/2024 --   LD: 05/01/2024  IF BP CRITERIA FAILED - provide a courtesy refill 3 months and schedule a medication titration appointment unless noted in provider’s chart note. Courtesy refill may not be repeated.     ibuprofen (MOTRIN) 200 MG tablet 120 tablet 1 9/15/2023 --     If the ALT, AST, eGFR, HCT, HGB, or Potassium have not been resulted in the required timeframe, order missing lab(s), give courtesy refill x 3 months, and notify patient that labs are due     Last office visit date: 02/27/2024  Last dispensed: 02/01/2024  Medication Refill Protocol Failed.       Yes

## 2024-06-05 ENCOUNTER — APPOINTMENT (OUTPATIENT)
Dept: FAMILY MEDICINE | Facility: CLINIC | Age: 89
End: 2024-06-05
Payer: MEDICARE

## 2024-06-05 VITALS
RESPIRATION RATE: 14 BRPM | DIASTOLIC BLOOD PRESSURE: 82 MMHG | TEMPERATURE: 98.6 F | HEART RATE: 80 BPM | SYSTOLIC BLOOD PRESSURE: 149 MMHG | OXYGEN SATURATION: 95 %

## 2024-06-05 DIAGNOSIS — I10 ESSENTIAL (PRIMARY) HYPERTENSION: ICD-10-CM

## 2024-06-05 DIAGNOSIS — D51.9 VITAMIN B12 DEFICIENCY ANEMIA, UNSPECIFIED: ICD-10-CM

## 2024-06-05 DIAGNOSIS — G47.9 SLEEP DISORDER, UNSPECIFIED: ICD-10-CM

## 2024-06-05 DIAGNOSIS — E03.9 HYPOTHYROIDISM, UNSPECIFIED: ICD-10-CM

## 2024-06-05 DIAGNOSIS — J44.9 CHRONIC OBSTRUCTIVE PULMONARY DISEASE, UNSPECIFIED: ICD-10-CM

## 2024-06-05 PROCEDURE — G2211 COMPLEX E/M VISIT ADD ON: CPT

## 2024-06-05 PROCEDURE — 99214 OFFICE O/P EST MOD 30 MIN: CPT

## 2024-06-06 NOTE — ASSESSMENT
[FreeTextEntry1] : Behavior modification of sleep d/w pt daughter and HHA but not willing to remove the TV from Bedroom as pt refuses to make changes per HAA that will make her too upset trial Gabapentin  rikss and benefits of medication d/w pt daughter and HHA in room

## 2024-06-06 NOTE — REVIEW OF SYSTEMS
[Insomnia] : insomnia [Fatigue] : fatigue [de-identified] : when asked pt states she is fine but both daughter and HHA states she does not sleep well

## 2024-06-06 NOTE — HISTORY OF PRESENT ILLNESS
[Family Member] : family member [FreeTextEntry8] : 89 y/o PMHX HTN CAD COPD recent bilateral eye injection for active exudative AMD now here for acute visit as pt is having a lot issues not able to sleep at night. pt daughter had increased her mirtazapine as I had advised and notes is not seeling at all. Daughter here states despite increased medication still not sleeping  [Other: _____] : [unfilled]

## 2024-06-06 NOTE — PHYSICAL EXAM
[No Acute Distress] : no acute distress [Well-Appearing] : well-appearing [No JVD] : no jugular venous distention [Normal] : no respiratory distress, lungs were clear to auscultation bilaterally and no accessory muscle use [Normal S1, S2] : normal S1 and S2 [Soft] : abdomen soft [Non Tender] : non-tender [No Joint Swelling] : no joint swelling [No Rash] : no rash [de-identified] : AA in NAD [place]

## 2024-06-24 RX ORDER — GABAPENTIN 100 MG/1
100 CAPSULE ORAL
Qty: 90 | Refills: 3 | Status: ACTIVE | COMMUNITY
Start: 2024-06-05 | End: 1900-01-01

## 2024-10-23 NOTE — HISTORY OF PRESENT ILLNESS
[Family Member] : family member [Other: _____] : [unfilled] [de-identified] : 89 y/o PMHX HNT HLD, ASHD, GERD decreased GFR here for annual wellness visit notes still lives at home alone and is able to do all activities of daily living  Daughter takes her out shopping  Pt no falls in the home or any falls at all. feels is strong and denies bowel or bladder incontinence states is fine. No CP SOB palpitations or dizziness. Dnenies pain swelling joints or issues ,[email or web address]

## 2024-11-06 ENCOUNTER — APPOINTMENT (OUTPATIENT)
Dept: FAMILY MEDICINE | Facility: CLINIC | Age: 89
End: 2024-11-06

## 2024-11-13 ENCOUNTER — INPATIENT (INPATIENT)
Facility: HOSPITAL | Age: 89
LOS: 1 days | Discharge: HOME | DRG: 871 | End: 2024-11-15
Attending: INTERNAL MEDICINE | Admitting: INTERNAL MEDICINE
Payer: MEDICARE

## 2024-11-13 VITALS
OXYGEN SATURATION: 98 % | TEMPERATURE: 98 F | DIASTOLIC BLOOD PRESSURE: 75 MMHG | WEIGHT: 125 LBS | HEIGHT: 61 IN | HEART RATE: 107 BPM | SYSTOLIC BLOOD PRESSURE: 117 MMHG | RESPIRATION RATE: 18 BRPM

## 2024-11-13 DIAGNOSIS — Z98.89 OTHER SPECIFIED POSTPROCEDURAL STATES: Chronic | ICD-10-CM

## 2024-11-13 DIAGNOSIS — A41.9 SEPSIS, UNSPECIFIED ORGANISM: ICD-10-CM

## 2024-11-13 DIAGNOSIS — Z98.49 CATARACT EXTRACTION STATUS, UNSPECIFIED EYE: Chronic | ICD-10-CM

## 2024-11-13 DIAGNOSIS — Z90.710 ACQUIRED ABSENCE OF BOTH CERVIX AND UTERUS: Chronic | ICD-10-CM

## 2024-11-13 DIAGNOSIS — S72.009A FRACTURE OF UNSPECIFIED PART OF NECK OF UNSPECIFIED FEMUR, INITIAL ENCOUNTER FOR CLOSED FRACTURE: Chronic | ICD-10-CM

## 2024-11-13 LAB
ALBUMIN SERPL ELPH-MCNC: 1.5 G/DL — LOW (ref 3.3–5)
ALBUMIN SERPL ELPH-MCNC: 2.2 G/DL — LOW (ref 3.3–5)
ALP SERPL-CCNC: 117 U/L — SIGNIFICANT CHANGE UP (ref 40–120)
ALP SERPL-CCNC: 88 U/L — SIGNIFICANT CHANGE UP (ref 40–120)
ALT FLD-CCNC: 14 U/L — SIGNIFICANT CHANGE UP (ref 10–45)
ALT FLD-CCNC: 25 U/L — SIGNIFICANT CHANGE UP (ref 10–45)
ANION GAP SERPL CALC-SCNC: 11 MMOL/L — SIGNIFICANT CHANGE UP (ref 5–17)
ANION GAP SERPL CALC-SCNC: 3 MMOL/L — LOW (ref 5–17)
APPEARANCE UR: CLEAR — SIGNIFICANT CHANGE UP
AST SERPL-CCNC: 18 U/L — SIGNIFICANT CHANGE UP (ref 10–40)
AST SERPL-CCNC: 43 U/L — HIGH (ref 10–40)
BACTERIA # UR AUTO: ABNORMAL /HPF
BASOPHILS # BLD AUTO: 0.01 K/UL — SIGNIFICANT CHANGE UP (ref 0–0.2)
BASOPHILS NFR BLD AUTO: 0.1 % — SIGNIFICANT CHANGE UP (ref 0–2)
BILIRUB SERPL-MCNC: 0.4 MG/DL — SIGNIFICANT CHANGE UP (ref 0.2–1.2)
BILIRUB SERPL-MCNC: 0.7 MG/DL — SIGNIFICANT CHANGE UP (ref 0.2–1.2)
BILIRUB UR-MCNC: ABNORMAL
BUN SERPL-MCNC: 18 MG/DL — SIGNIFICANT CHANGE UP (ref 7–23)
BUN SERPL-MCNC: 25 MG/DL — HIGH (ref 7–23)
CALCIUM SERPL-MCNC: 5.7 MG/DL — CRITICAL LOW (ref 8.4–10.5)
CALCIUM SERPL-MCNC: 8.6 MG/DL — SIGNIFICANT CHANGE UP (ref 8.4–10.5)
CHLORIDE SERPL-SCNC: 103 MMOL/L — SIGNIFICANT CHANGE UP (ref 96–108)
CHLORIDE SERPL-SCNC: 113 MMOL/L — HIGH (ref 96–108)
CO2 SERPL-SCNC: 19 MMOL/L — LOW (ref 22–31)
CO2 SERPL-SCNC: 30 MMOL/L — SIGNIFICANT CHANGE UP (ref 22–31)
COD CRY URNS QL: PRESENT
COLOR SPEC: SIGNIFICANT CHANGE UP
CREAT SERPL-MCNC: 0.72 MG/DL — SIGNIFICANT CHANGE UP (ref 0.5–1.3)
CREAT SERPL-MCNC: 1.06 MG/DL — SIGNIFICANT CHANGE UP (ref 0.5–1.3)
DIFF PNL FLD: NEGATIVE — SIGNIFICANT CHANGE UP
EGFR: 48 ML/MIN/1.73M2 — LOW
EGFR: 77 ML/MIN/1.73M2 — SIGNIFICANT CHANGE UP
EOSINOPHIL # BLD AUTO: 0.12 K/UL — SIGNIFICANT CHANGE UP (ref 0–0.5)
EOSINOPHIL NFR BLD AUTO: 0.9 % — SIGNIFICANT CHANGE UP (ref 0–6)
EPI CELLS # UR: PRESENT
FLUAV AG NPH QL: SIGNIFICANT CHANGE UP
FLUBV AG NPH QL: SIGNIFICANT CHANGE UP
GLUCOSE SERPL-MCNC: 120 MG/DL — HIGH (ref 70–99)
GLUCOSE SERPL-MCNC: 126 MG/DL — HIGH (ref 70–99)
GLUCOSE UR QL: NEGATIVE MG/DL — SIGNIFICANT CHANGE UP
HCT VFR BLD CALC: 29.3 % — LOW (ref 34.5–45)
HGB BLD-MCNC: 9.8 G/DL — LOW (ref 11.5–15.5)
IMM GRANULOCYTES NFR BLD AUTO: 0.6 % — SIGNIFICANT CHANGE UP (ref 0–0.9)
KETONES UR-MCNC: ABNORMAL MG/DL
LACTATE SERPL-SCNC: 1.7 MMOL/L — SIGNIFICANT CHANGE UP (ref 0.7–2)
LEUKOCYTE ESTERASE UR-ACNC: ABNORMAL
LYMPHOCYTES # BLD AUTO: 0.53 K/UL — LOW (ref 1–3.3)
LYMPHOCYTES # BLD AUTO: 3.9 % — LOW (ref 13–44)
MCHC RBC-ENTMCNC: 30.9 PG — SIGNIFICANT CHANGE UP (ref 27–34)
MCHC RBC-ENTMCNC: 33.4 G/DL — SIGNIFICANT CHANGE UP (ref 32–36)
MCV RBC AUTO: 92.4 FL — SIGNIFICANT CHANGE UP (ref 80–100)
MONOCYTES # BLD AUTO: 0.44 K/UL — SIGNIFICANT CHANGE UP (ref 0–0.9)
MONOCYTES NFR BLD AUTO: 3.3 % — SIGNIFICANT CHANGE UP (ref 2–14)
NEUTROPHILS # BLD AUTO: 12.34 K/UL — HIGH (ref 1.8–7.4)
NEUTROPHILS NFR BLD AUTO: 91.2 % — HIGH (ref 43–77)
NITRITE UR-MCNC: NEGATIVE — SIGNIFICANT CHANGE UP
NRBC # BLD: 0 /100 WBCS — SIGNIFICANT CHANGE UP (ref 0–0)
PH UR: 7.5 — SIGNIFICANT CHANGE UP (ref 5–8)
PLATELET # BLD AUTO: 181 K/UL — SIGNIFICANT CHANGE UP (ref 150–400)
POTASSIUM SERPL-MCNC: 2.5 MMOL/L — CRITICAL LOW (ref 3.5–5.3)
POTASSIUM SERPL-MCNC: 4.4 MMOL/L — SIGNIFICANT CHANGE UP (ref 3.5–5.3)
POTASSIUM SERPL-SCNC: 2.5 MMOL/L — CRITICAL LOW (ref 3.5–5.3)
POTASSIUM SERPL-SCNC: 4.4 MMOL/L — SIGNIFICANT CHANGE UP (ref 3.5–5.3)
PROT SERPL-MCNC: 4.3 G/DL — LOW (ref 6–8.3)
PROT SERPL-MCNC: 6 G/DL — SIGNIFICANT CHANGE UP (ref 6–8.3)
PROT UR-MCNC: 30 MG/DL
RBC # BLD: 3.17 M/UL — LOW (ref 3.8–5.2)
RBC # FLD: 14.9 % — HIGH (ref 10.3–14.5)
RBC CASTS # UR COMP ASSIST: 0 /HPF — SIGNIFICANT CHANGE UP (ref 0–4)
RSV RNA NPH QL NAA+NON-PROBE: SIGNIFICANT CHANGE UP
SARS-COV-2 RNA SPEC QL NAA+PROBE: SIGNIFICANT CHANGE UP
SODIUM SERPL-SCNC: 136 MMOL/L — SIGNIFICANT CHANGE UP (ref 135–145)
SODIUM SERPL-SCNC: 143 MMOL/L — SIGNIFICANT CHANGE UP (ref 135–145)
SP GR SPEC: 1.03 — HIGH (ref 1–1.03)
UROBILINOGEN FLD QL: 1 MG/DL — SIGNIFICANT CHANGE UP (ref 0.2–1)
WBC # BLD: 13.52 K/UL — HIGH (ref 3.8–10.5)
WBC # FLD AUTO: 13.52 K/UL — HIGH (ref 3.8–10.5)
WBC UR QL: 4 /HPF — SIGNIFICANT CHANGE UP (ref 0–5)

## 2024-11-13 PROCEDURE — 71045 X-RAY EXAM CHEST 1 VIEW: CPT | Mod: 26

## 2024-11-13 PROCEDURE — 99291 CRITICAL CARE FIRST HOUR: CPT

## 2024-11-13 PROCEDURE — 99233 SBSQ HOSP IP/OBS HIGH 50: CPT

## 2024-11-13 PROCEDURE — 99497 ADVNCD CARE PLAN 30 MIN: CPT

## 2024-11-13 RX ORDER — GABAPENTIN 300 MG/1
200 CAPSULE ORAL AT BEDTIME
Refills: 0 | Status: DISCONTINUED | OUTPATIENT
Start: 2024-11-13 | End: 2024-11-15

## 2024-11-13 RX ORDER — PANTOPRAZOLE SODIUM 40 MG/1
40 TABLET, DELAYED RELEASE ORAL
Refills: 0 | Status: DISCONTINUED | OUTPATIENT
Start: 2024-11-13 | End: 2024-11-15

## 2024-11-13 RX ORDER — MAGNESIUM, ALUMINUM HYDROXIDE 200-200 MG
30 TABLET,CHEWABLE ORAL EVERY 4 HOURS
Refills: 0 | Status: DISCONTINUED | OUTPATIENT
Start: 2024-11-13 | End: 2024-11-15

## 2024-11-13 RX ORDER — POTASSIUM CHLORIDE 10 MEQ
10 TABLET, EXTENDED RELEASE ORAL ONCE
Refills: 0 | Status: COMPLETED | OUTPATIENT
Start: 2024-11-13 | End: 2024-11-13

## 2024-11-13 RX ORDER — MEROPENEM 1 G/30ML
1000 INJECTION INTRAVENOUS EVERY 8 HOURS
Refills: 0 | Status: DISCONTINUED | OUTPATIENT
Start: 2024-11-13 | End: 2024-11-14

## 2024-11-13 RX ORDER — ONDANSETRON HYDROCHLORIDE 2 MG/ML
4 INJECTION, SOLUTION INTRAMUSCULAR; INTRAVENOUS EVERY 8 HOURS
Refills: 0 | Status: DISCONTINUED | OUTPATIENT
Start: 2024-11-13 | End: 2024-11-15

## 2024-11-13 RX ORDER — MAGNESIUM OXIDE 400 MG/1
400 TABLET ORAL ONCE
Refills: 0 | Status: COMPLETED | OUTPATIENT
Start: 2024-11-13 | End: 2024-11-13

## 2024-11-13 RX ORDER — MEROPENEM 1 G/30ML
1000 INJECTION INTRAVENOUS ONCE
Refills: 0 | Status: COMPLETED | OUTPATIENT
Start: 2024-11-13 | End: 2024-11-13

## 2024-11-13 RX ORDER — ENOXAPARIN SODIUM 40MG/0.4ML
40 SYRINGE (ML) SUBCUTANEOUS EVERY 24 HOURS
Refills: 0 | Status: DISCONTINUED | OUTPATIENT
Start: 2024-11-13 | End: 2024-11-15

## 2024-11-13 RX ORDER — ACETAMINOPHEN 500 MG
1000 TABLET ORAL ONCE
Refills: 0 | Status: COMPLETED | OUTPATIENT
Start: 2024-11-13 | End: 2024-11-13

## 2024-11-13 RX ORDER — SODIUM CHLORIDE 9 MG/ML
500 INJECTION, SOLUTION INTRAMUSCULAR; INTRAVENOUS; SUBCUTANEOUS ONCE
Refills: 0 | Status: COMPLETED | OUTPATIENT
Start: 2024-11-13 | End: 2024-11-13

## 2024-11-13 RX ORDER — ACETAMINOPHEN 500 MG
650 TABLET ORAL EVERY 6 HOURS
Refills: 0 | Status: DISCONTINUED | OUTPATIENT
Start: 2024-11-13 | End: 2024-11-15

## 2024-11-13 RX ORDER — TAMSULOSIN HCL 0.4 MG
0.4 CAPSULE ORAL AT BEDTIME
Refills: 0 | Status: DISCONTINUED | OUTPATIENT
Start: 2024-11-13 | End: 2024-11-15

## 2024-11-13 RX ORDER — MELATONIN 5 MG
3 TABLET ORAL AT BEDTIME
Refills: 0 | Status: DISCONTINUED | OUTPATIENT
Start: 2024-11-13 | End: 2024-11-15

## 2024-11-13 RX ORDER — INFLUENZ VIR VAC TV P-SURF2003 15MCG/.5ML
0.5 SYRINGE (ML) INTRAMUSCULAR ONCE
Refills: 0 | Status: DISCONTINUED | OUTPATIENT
Start: 2024-11-13 | End: 2024-11-15

## 2024-11-13 RX ORDER — B-COMPLEX WITH VITAMIN C
1 VIAL (ML) INJECTION DAILY
Refills: 0 | Status: DISCONTINUED | OUTPATIENT
Start: 2024-11-13 | End: 2024-11-15

## 2024-11-13 RX ORDER — MIRTAZAPINE 30 MG/1
7.5 TABLET ORAL AT BEDTIME
Refills: 0 | Status: DISCONTINUED | OUTPATIENT
Start: 2024-11-13 | End: 2024-11-15

## 2024-11-13 RX ORDER — SODIUM CHLORIDE 9 MG/ML
1750 INJECTION, SOLUTION INTRAMUSCULAR; INTRAVENOUS; SUBCUTANEOUS ONCE
Refills: 0 | Status: DISCONTINUED | OUTPATIENT
Start: 2024-11-13 | End: 2024-11-13

## 2024-11-13 RX ORDER — LEVOTHYROXINE SODIUM 88 MCG
75 TABLET ORAL DAILY
Refills: 0 | Status: DISCONTINUED | OUTPATIENT
Start: 2024-11-13 | End: 2024-11-15

## 2024-11-13 RX ADMIN — Medication 1 TABLET(S): at 21:59

## 2024-11-13 RX ADMIN — GABAPENTIN 200 MILLIGRAM(S): 300 CAPSULE ORAL at 21:58

## 2024-11-13 RX ADMIN — Medication 1000 MILLIGRAM(S): at 13:45

## 2024-11-13 RX ADMIN — Medication 100 MILLILITER(S): at 17:55

## 2024-11-13 RX ADMIN — Medication 40 MILLIGRAM(S): at 18:54

## 2024-11-13 RX ADMIN — Medication 100 MILLILITER(S): at 21:58

## 2024-11-13 RX ADMIN — Medication 3 MILLIGRAM(S): at 23:43

## 2024-11-13 RX ADMIN — SODIUM CHLORIDE 500 MILLILITER(S): 9 INJECTION, SOLUTION INTRAMUSCULAR; INTRAVENOUS; SUBCUTANEOUS at 13:15

## 2024-11-13 RX ADMIN — SODIUM CHLORIDE 500 MILLILITER(S): 9 INJECTION, SOLUTION INTRAMUSCULAR; INTRAVENOUS; SUBCUTANEOUS at 14:30

## 2024-11-13 RX ADMIN — Medication 0.4 MILLIGRAM(S): at 21:59

## 2024-11-13 RX ADMIN — Medication 40 MILLIGRAM(S): at 21:59

## 2024-11-13 RX ADMIN — MEROPENEM 100 MILLIGRAM(S): 1 INJECTION INTRAVENOUS at 16:52

## 2024-11-13 RX ADMIN — MAGNESIUM OXIDE 400 MILLIGRAM(S): 400 TABLET ORAL at 14:43

## 2024-11-13 RX ADMIN — MEROPENEM 100 MILLIGRAM(S): 1 INJECTION INTRAVENOUS at 23:04

## 2024-11-13 RX ADMIN — Medication 10 MILLIEQUIVALENT(S): at 16:50

## 2024-11-13 RX ADMIN — Medication 1000 MILLIGRAM(S): at 13:44

## 2024-11-13 RX ADMIN — MIRTAZAPINE 7.5 MILLIGRAM(S): 30 TABLET ORAL at 23:04

## 2024-11-13 RX ADMIN — Medication 400 MILLIGRAM(S): at 13:27

## 2024-11-13 RX ADMIN — Medication 100 MILLIEQUIVALENT(S): at 14:43

## 2024-11-13 NOTE — PATIENT PROFILE ADULT - BILL PAYMENT
through all planes of movement  Exercises  Scapular Protraction: x  Scapular Retraction: x  Shoulder Depression: x  Shoulder Elevation: x  Shoulder Flexion: x  Shoulder Extension: x  Shoulder ABduction: x  Shoulder ADduction: x  Horizontal ABduction: x  Horizontal ADduction: x  Chair Push-ups: x  Elbow Flexion: x  Elbow Extension: x  Supination: x  Pronation: x  Wrist Flexion: x  Wrist Extension: x  Finger Flexion: x  Finger Extension: x  Grasp/Release: x                    Vision  Vision: Impaired  Vision Exceptions: Wears glasses for reading             OT FIM:   Groomin - Did not occur  Bathin - Did not occur  Dressing-Upper: 0 - Did not occur  Dressing-Lower: 0 - Did not occur  Toiletin - Did not occur    Social Interaction: 6 - Patient requires medication for mood and/or effect  Problem Solvin - Patient able to solve simple/routine tasks       Assessment  Performance deficits / Impairments: Decreased functional mobility ; Decreased safe awareness;Decreased balance;Decreased coordination;Decreased ADL status; Decreased cognition;Decreased vision/visual deficit; Decreased endurance;Decreased high-level IADLs;Decreased sensation;Decreased fine motor control  Assessment: Pt will be discharging home c safety concerns. Prognosis: Good  Discharge Recommendations: Patient would benefit from continued therapy after discharge;24 hour supervision or assist  Activity Tolerance: Patient Tolerated treatment well;Patient limited by pain  Activity Tolerance: Pt req rest breaks c utilization of LUE  Safety Devices in place: Yes  Type of devices: Left in chair;Chair alarm in place  Restraints  Initially in place: Yes  Equipment Recommendations  Equipment Needed: Yes  Comments: RN gave pain patch at end of tx.     Patient Education:  Patient Goals   Patient goals : return to indep as prior  Patient Education: reinforced safety and call light protocal.   Learner:patient  Method: explanation       Outcome: no

## 2024-11-13 NOTE — ED ADULT NURSE NOTE - NSICDXPASTSURGICALHX_GEN_ALL_CORE_FT
PAST SURGICAL HISTORY:  Fractured hip     S/P angioplasty with stent RCA with stent 2008    S/P cataract extraction and insertion of intraocular lens     S/P hysterectomy

## 2024-11-13 NOTE — H&P ADULT - NSHPPHYSICALEXAM_GEN_ALL_CORE
General: WDWN in no apparent distress. A+Ox1   Skin: Warm, dry, intact with no rashes, lesions, or abrasions. No signs of jaundice or cyanosis.  Head: Normocephalic, atraumatic  Eyes: Sclera white, conjunctiva clear, no discharge or erythema.  Chest/lungs: Symmetric chest rise. Respiratory rate is regular, non-labored. Breath sounds clear B/L, no wheezing, rhonchi, rales.  Cardio:  S1S2 normal. No peripheral edema or cyanosis, no high grade murmur  Abdomen: Soft, NTND. No rebound or guarding. Positive bowel sounds x 4 quadrants.   MSK: No atrophy or muscle wasting, no cyanois  Psych: appropriate mood and affect.

## 2024-11-13 NOTE — ED PROVIDER NOTE - PHYSICAL EXAMINATION
General: WDWN in no apparent distress. A+Ox1   Skin: Warm, dry, intact with no rashes, lesions, or abrasions. No signs of jaundice or cyanosis.  Head: Normocephalic, atraumatic  Eyes: Sclera white, conjunctiva clear, no discharge or erythema.  chest/lungs: Symmetric chest rise. Respiratory rate is regular, non-labored. Breath sounds clear B/L, no wheezing, rhonchi, rales.  Cardio: Tachycardiac. S1S2 normal. No peripheral edema or cyanosis  Abdomen: Soft, NTND. No rebound or guarding. Positive bowel sounds x 4 quadrants.   MSK: Full ROM to all extremities. No atrophy or muscle wasting.   psych: appropriate mood and affect.

## 2024-11-13 NOTE — PATIENT PROFILE ADULT - FALL HARM RISK - HARM RISK INTERVENTIONS
Assistance with ambulation/Assistance OOB with selected safe patient handling equipment/Communicate Risk of Fall with Harm to all staff/Discuss with provider need for PT consult/Monitor gait and stability/Reinforce activity limits and safety measures with patient and family/Tailored Fall Risk Interventions/Visual Cue: Yellow wristband and red socks/Bed in lowest position, wheels locked, appropriate side rails in place/Call bell, personal items and telephone in reach/Instruct patient to call for assistance before getting out of bed or chair/Non-slip footwear when patient is out of bed/[place] to call system/Physically safe environment - no spills, clutter or unnecessary equipment/Purposeful Proactive Rounding/Room/bathroom lighting operational, light cord in reach

## 2024-11-13 NOTE — H&P ADULT - HISTORY OF PRESENT ILLNESS
The patient, a 89-year-old female, was brought in by her family due to acute weakness and increased confusion observed on the day of the encounter. She was previously treated for a urinary tract infection one week ago with Macrobid, with the last dose taken the evening before the ER visit. On the morning of presentation, she was exceptionally weak and unable to get out of bed. Her family noted a decline in her ability to perform activities of daily living, requiring extensive assistance for transfers involving two people, whereas she typically uses a walker at baseline. The patient was also noted to have a fever of 102°F. Lab results indicated hypokalemia. She was administered meropenem, potassium supplementation, and magnesium supplementation. Additionally, she has a history of acute cystitis with hematuria.    Review of Systems:  - Constitutional: Reports fever and weakness.  - Neurological: Reports increased confusion and inability to answer questions.  - Musculoskeletal: Reports inability to get out of bed without assistance.  - Rest of 14 review of systems are negative.    - Acute cystitis with hematuria  - COPD  - Anemia due to B12 deficiency  - Primary hypertension  - Hypothyroidism  - Gastroesophageal reflux disease (GERD)  - Dementia  - Chronic kidney disease  - Hyperlipidemia  - Left bundle branch block  - Diaphragmatic hernia    Past Surgical History:  - Displaced intertrochanteric fracture of the right femur with subsequent encounter for closure.

## 2024-11-13 NOTE — ED PROVIDER NOTE - CLINICAL SUMMARY MEDICAL DECISION MAKING FREE TEXT BOX
89 y/o F with PMHx dementia, CAD, HTN, HLD presents c/o weakness. Daughter is at bedside providing the history. States pt had a UTI x 1 week ago and was treated with Macrobid, last dose was last night. This morning pt did not want to get out of bed, which is atypical for her. She then went to the bathroom at 8am and was unable to use her walker and needed to be pushed in a wheelchair. Daughter says while she was on the toilet she was "dead weight." Daughter also states pt has been more confused over the past day. Pt unable to answer questions regarding s/s and ROS.    Exam as stated  Sepsis foster inititated - was pending abx prior to UA sample but pt on primafit. 2/2 multiple allergies will stard meropenem and send UC   Patient to be admitted to an inpatient floor. Pt/family notified and agreeable to plan. Case discussed with and care endorsed to hospitalist.

## 2024-11-13 NOTE — ED ADULT NURSE NOTE - OBJECTIVE STATEMENT
Patient brought in by EMS from home with complaint of weakness. Patient was diagnosed with UTI and completed dose of antibiotics however family noticed she got more weak. Denies any falls or hitting her head. Per EMS, noted to have abnormal EKG. Patient denies any chest pain.  HHA noticed dark urine, foul smelling.

## 2024-11-13 NOTE — ED PROVIDER NOTE - CRITICAL CARE ATTENDING CONTRIBUTION TO CARE
Upon my evaluation, this patient had a high probability of imminent or life-threatening deterioration due to sepsis, which required my direct attention, intervention, and personal management.  The patient has a  medical condition that impairs one or more vital organ systems.  Frequent personal assessment and adjustment of medical interventions was performed.      I have personally provided 60 minutes of critical care time exclusive of time spent on separately billable procedures. Time includes review of laboratory data, radiology results, discussion with consultants, patient and family; monitoring for potential decompensation, as well as time spent retrieving data and reviewing the chart and documenting the visit. Interventions were performed as documented above.

## 2024-11-13 NOTE — H&P ADULT - ASSESSMENT
A 89-year-old female with multiple comorbidities presents with acute weakness, confusion, and fever following recent treatment for a urinary tract infection.    Assessment:  1. Urinary tract infection with failure of outpatient treatment with Macrobid.  2. Febrile episode requiring IV antibiotic therapy.  3. Hypokalemia.  4. Confusion and weakness likely secondary to infection and/or electrolyte imbalance.  5. History of multiple comorbid conditions including COPD, anemia, hypertension, hypothyroidism, dementia, and chronic kidney disease.    Plan:  - Start IV Meropenem, pending ID approval and evaluation due to multiple antibiotic allergies.  - IV hydration with lactated Ringer's for 24 hours.  - DVT prophylaxis with Lovenox.  - Continue atorvastatin 40 mg for hyperlipidemia.  - Administer gabapentin 200 mg at bedtime for lower extremity pain.  - Resume thyroid replacement therapy 75 mg for hypothyroidism.  - Treat insomnia with melatonin 3 mg as needed and mirtazapine 7.5 mg at bedtime.  - Continue multivitamin for general health.  - Continue pantoprazole 40 mg for GERD.  - Administer tamsulosin 0.4 mg for urinary retention.    Advanced Care Planning 25 minutes  DNR/DNI  MEGAN reconciled and signed  [facility] Care proxy at bedside

## 2024-11-13 NOTE — ED PROVIDER NOTE - CROS ED ROS STATEMENT
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      L3-L4: There is mild  bulging of the disk- There is no significant central canal stenosis.      L4-L5: There is grade 1 anterolisthesis (the L4 vertebra is slipped forward of the L 5 Vertebra- which is due to the DJD changes).  This is causing mild bulging and mild central canal stenosis.       L5-S1: There is mild  bulging and  DJD changes without any significant central canal stenosis.     She can seek an evaluation with a Neurosurgeon or consider LESI with Pain Clinic  all other ROS negative except as per HPI

## 2024-11-13 NOTE — ED ADULT TRIAGE NOTE - CHIEF COMPLAINT QUOTE
Patient brought in by EMS from home with complaint of weakness. Patient was diagnosed with UTI and completed dose of antibiotics however family noticed she got more weak. Denies any falls or hitting her head. Per EMS, noted to have abnormal EKG. Patient denies any chest pain

## 2024-11-13 NOTE — ED PROVIDER NOTE - OBJECTIVE STATEMENT
89 y/o F with PMHx dementia, CAD, HTN, HLD presents c/o weakness. Daughter is at bedside providing the history. States pt had a UTI x 1 week ago and was treated with Macrobid, last dose was last night. This morning pt did not want to get out of bed, which is atypical for her. She then went to the bathroom at 8am and was unable to use her walker and needed to be pushed in a wheelchair. Daughter says while she was on the toilet she was "dead weight." Daughter also states pt has been more confused over the past day. Pt unable to answer questions regarding s/s and ROS.

## 2024-11-14 ENCOUNTER — RESULT REVIEW (OUTPATIENT)
Age: 89
End: 2024-11-14

## 2024-11-14 ENCOUNTER — NON-APPOINTMENT (OUTPATIENT)
Age: 89
End: 2024-11-14

## 2024-11-14 ENCOUNTER — APPOINTMENT (OUTPATIENT)
Dept: CARDIOLOGY | Facility: CLINIC | Age: 89
End: 2024-11-14

## 2024-11-14 LAB
ALBUMIN SERPL ELPH-MCNC: 2 G/DL — LOW (ref 3.3–5)
ALP SERPL-CCNC: 114 U/L — SIGNIFICANT CHANGE UP (ref 40–120)
ALT FLD-CCNC: 22 U/L — SIGNIFICANT CHANGE UP (ref 10–45)
ANION GAP SERPL CALC-SCNC: 7 MMOL/L — SIGNIFICANT CHANGE UP (ref 5–17)
AST SERPL-CCNC: 25 U/L — SIGNIFICANT CHANGE UP (ref 10–40)
BASOPHILS # BLD AUTO: 0.02 K/UL — SIGNIFICANT CHANGE UP (ref 0–0.2)
BASOPHILS NFR BLD AUTO: 0.2 % — SIGNIFICANT CHANGE UP (ref 0–2)
BILIRUB SERPL-MCNC: 0.9 MG/DL — SIGNIFICANT CHANGE UP (ref 0.2–1.2)
BUN SERPL-MCNC: 21 MG/DL — SIGNIFICANT CHANGE UP (ref 7–23)
CALCIUM SERPL-MCNC: 8.7 MG/DL — SIGNIFICANT CHANGE UP (ref 8.4–10.5)
CHLORIDE SERPL-SCNC: 103 MMOL/L — SIGNIFICANT CHANGE UP (ref 96–108)
CO2 SERPL-SCNC: 27 MMOL/L — SIGNIFICANT CHANGE UP (ref 22–31)
CREAT SERPL-MCNC: 0.9 MG/DL — SIGNIFICANT CHANGE UP (ref 0.5–1.3)
EGFR: 59 ML/MIN/1.73M2 — LOW
EOSINOPHIL # BLD AUTO: 0.4 K/UL — SIGNIFICANT CHANGE UP (ref 0–0.5)
EOSINOPHIL NFR BLD AUTO: 3.1 % — SIGNIFICANT CHANGE UP (ref 0–6)
GLUCOSE SERPL-MCNC: 101 MG/DL — HIGH (ref 70–99)
HCT VFR BLD CALC: 32.9 % — LOW (ref 34.5–45)
HGB BLD-MCNC: 10.8 G/DL — LOW (ref 11.5–15.5)
IMM GRANULOCYTES NFR BLD AUTO: 0.5 % — SIGNIFICANT CHANGE UP (ref 0–0.9)
LYMPHOCYTES # BLD AUTO: 1.04 K/UL — SIGNIFICANT CHANGE UP (ref 1–3.3)
LYMPHOCYTES # BLD AUTO: 8.1 % — LOW (ref 13–44)
MCHC RBC-ENTMCNC: 30.3 PG — SIGNIFICANT CHANGE UP (ref 27–34)
MCHC RBC-ENTMCNC: 32.8 G/DL — SIGNIFICANT CHANGE UP (ref 32–36)
MCV RBC AUTO: 92.4 FL — SIGNIFICANT CHANGE UP (ref 80–100)
MONOCYTES # BLD AUTO: 0.45 K/UL — SIGNIFICANT CHANGE UP (ref 0–0.9)
MONOCYTES NFR BLD AUTO: 3.5 % — SIGNIFICANT CHANGE UP (ref 2–14)
NEUTROPHILS # BLD AUTO: 10.91 K/UL — HIGH (ref 1.8–7.4)
NEUTROPHILS NFR BLD AUTO: 84.6 % — HIGH (ref 43–77)
NRBC # BLD: 0 /100 WBCS — SIGNIFICANT CHANGE UP (ref 0–0)
PLATELET # BLD AUTO: 194 K/UL — SIGNIFICANT CHANGE UP (ref 150–400)
POTASSIUM SERPL-MCNC: 3.7 MMOL/L — SIGNIFICANT CHANGE UP (ref 3.5–5.3)
POTASSIUM SERPL-SCNC: 3.7 MMOL/L — SIGNIFICANT CHANGE UP (ref 3.5–5.3)
PROT SERPL-MCNC: 5.7 G/DL — LOW (ref 6–8.3)
RBC # BLD: 3.56 M/UL — LOW (ref 3.8–5.2)
RBC # FLD: 15.2 % — HIGH (ref 10.3–14.5)
SODIUM SERPL-SCNC: 137 MMOL/L — SIGNIFICANT CHANGE UP (ref 135–145)
WBC # BLD: 12.88 K/UL — HIGH (ref 3.8–10.5)
WBC # FLD AUTO: 12.88 K/UL — HIGH (ref 3.8–10.5)

## 2024-11-14 PROCEDURE — 71250 CT THORAX DX C-: CPT | Mod: 26

## 2024-11-14 PROCEDURE — 93306 TTE W/DOPPLER COMPLETE: CPT | Mod: 26

## 2024-11-14 PROCEDURE — 74176 CT ABD & PELVIS W/O CONTRAST: CPT | Mod: 26

## 2024-11-14 PROCEDURE — 99233 SBSQ HOSP IP/OBS HIGH 50: CPT

## 2024-11-14 RX ORDER — PIPERACILLIN AND TAZOBACTAM .5; 4 G/20ML; G/20ML
3.38 INJECTION, POWDER, LYOPHILIZED, FOR SOLUTION INTRAVENOUS ONCE
Refills: 0 | Status: COMPLETED | OUTPATIENT
Start: 2024-11-14 | End: 2024-11-14

## 2024-11-14 RX ORDER — PIPERACILLIN AND TAZOBACTAM .5; 4 G/20ML; G/20ML
3.38 INJECTION, POWDER, LYOPHILIZED, FOR SOLUTION INTRAVENOUS EVERY 8 HOURS
Refills: 0 | Status: DISCONTINUED | OUTPATIENT
Start: 2024-11-14 | End: 2024-11-15

## 2024-11-14 RX ORDER — IPRATROPIUM BROMIDE AND ALBUTEROL SULFATE .5; 2.5 MG/3ML; MG/3ML
3 SOLUTION RESPIRATORY (INHALATION) EVERY 6 HOURS
Refills: 0 | Status: DISCONTINUED | OUTPATIENT
Start: 2024-11-14 | End: 2024-11-15

## 2024-11-14 RX ADMIN — Medication 0.4 MILLIGRAM(S): at 21:43

## 2024-11-14 RX ADMIN — Medication 75 MICROGRAM(S): at 06:39

## 2024-11-14 RX ADMIN — PIPERACILLIN AND TAZOBACTAM 200 GRAM(S): .5; 4 INJECTION, POWDER, LYOPHILIZED, FOR SOLUTION INTRAVENOUS at 14:24

## 2024-11-14 RX ADMIN — Medication 40 MILLIGRAM(S): at 21:44

## 2024-11-14 RX ADMIN — PIPERACILLIN AND TAZOBACTAM 25 GRAM(S): .5; 4 INJECTION, POWDER, LYOPHILIZED, FOR SOLUTION INTRAVENOUS at 21:43

## 2024-11-14 RX ADMIN — Medication 1 TABLET(S): at 11:44

## 2024-11-14 RX ADMIN — Medication 3 MILLIGRAM(S): at 21:43

## 2024-11-14 RX ADMIN — Medication 40 MILLIGRAM(S): at 17:33

## 2024-11-14 RX ADMIN — GABAPENTIN 200 MILLIGRAM(S): 300 CAPSULE ORAL at 21:43

## 2024-11-14 RX ADMIN — PANTOPRAZOLE SODIUM 40 MILLIGRAM(S): 40 TABLET, DELAYED RELEASE ORAL at 06:39

## 2024-11-14 RX ADMIN — MEROPENEM 100 MILLIGRAM(S): 1 INJECTION INTRAVENOUS at 06:39

## 2024-11-14 RX ADMIN — MIRTAZAPINE 7.5 MILLIGRAM(S): 30 TABLET ORAL at 21:44

## 2024-11-14 NOTE — DIETITIAN INITIAL EVALUATION ADULT - OTHER INFO
Initial Nutrition Assessment   89yr Old Female   Denies Food Allergy/Intolerance  Tolerates Diet Well - No Chewing/Swallowing Complications (Per Patient)  Stage 2 Pressure Ulcer on Sacrum (as Per Nursing Flow Sheets)  No Edema Noted (as Per Nursing Flow Sheets)  No Recent Nausea/Vomiting/Diarrhea/Constipation (as Per Patient)

## 2024-11-14 NOTE — DIETITIAN INITIAL EVALUATION ADULT - ORAL INTAKE PTA/DIET HISTORY
[facility] Neurology Outpatient Botox Procedure Note    History of Present Illness     This is 42 y.o. female with history of ADHD, type 2 diabetes, who is referred chronic migraine without aura, not intractable. Patient is here for Botox #3.     Age of Onset : 6 years old      Headache Description:   1. Frontal, holocephalic, pounding, severe, impeding day to day activity, lasting 2 days, w/ nausea, w/ photophobia and phonophobia   2. Frontal, occipital, dull, mild, duration varies, w/o nausea, w/o photophobia and phonophobia      Baseline Headache Frequency: daily headache days per month 12 - 16 migraine headache days per month  Interval Headache Frequency: daily headache days with 2-3 migraine headache days per month    Current Prophylactic  TPM  mg daily   Paroxetine 30 mg daily      Current Abortive  Methocarbamol  Maxalt 10 mg BID PRN  Reglan 5 mg BID PRN     Review of System      reviewed. stable.    Focused Exam     Stable. Reviewed.    Assessment     This is 42 y.o. female with history of ADHD, type 2 diabetes, who is referred chronic migraine without aura, not intractable. Patient is here for Botox #3.     Procedure     Date of procedure: 8/9/2023    Procedure: Chronic Migraine Chemodenervation with Botulinum toxin    The patient was identified and informed consent was reviewed with the patient, and we discussed the risks, benefits and alternatives. Specifically, we discussed the risks of bleeding, infection and nerve injury with worsened pain and function. Specifically, we discussed the most frequently reported adverse reactions following injection of BOTOX include headache (5%), eyelid ptosis (4%), muscular weakness (4%), bronchitis (3%), injection-site pain (3%), musculoskeletal pain (3%), myalgia (3%), facial paresis (2%), hypertension (2%), and muscle spasms (2%). The patient verbalized an understanding of these risks and the symptoms and the potentially catastrophic consequences of this occurrence.  The patient verbalized an understanding that if she should begin to have these symptoms that she should immediately go to the nearest emergency room for evaluation. The patient was then positioned. The injection sites were identified and was prepped with Alcohol. 4cc of preservative free normal saline was mixed with 200 units of Botox. A 30-gauge, 0.5 inch needle was then used to inject a total 155 units of Botox. Muscles injected as below. Patient tolerated the procedure well with no complaints.    A. : Botox dosage: 10 units in 2 sites Right: 5 Left: 5   B. Procerus Botox dosage: 5 Units in 1 site   C. Frontalis Botox dosage: 20 Units divided in 4 sites Right: 10 Left: 10   D. Temporalis Botox dosage: 40 Units divided in 8 sites Right: 20 Left: 20   E. Occipitalis Botox dosage 30 Units divided in 6 sites Right: 15 Left: 15   F. Cervical Paraspinal Botox dosage: 20 Units divided in 4 sites: Right 10 Left: 10   G. Trapezius Botox dosage: 30 Units divided in 6 sites: Right: 15 Left: 15     Total Units Injected: 155 units   Total Units discarded: 45 units     Follow Up       RTC 2 MONTHS   RTC BOTOX    Mariana Avilez MD   [facility] General Neurology         Patient Does Not Follow Diet @Home  Doesn't Take Vitamin/Supplements @Home

## 2024-11-14 NOTE — DIETITIAN INITIAL EVALUATION ADULT - ADD RECOMMEND
1) Monitor Weights, Intake, Tolerance, Skin, POCT & Labwork  2) Ensure Plus High Protein 8oz PO BID  3) Education Provided on Need for Supplementation   4) Continue Nutrition Plan of Care

## 2024-11-14 NOTE — DIETITIAN INITIAL EVALUATION ADULT - PATIENT MEETS CRITERIA FOR MALNUTRITION
Pediatric Acetaminophen/Ibuprofen Medication and Dosing Guide  (This is not a complete list of products)  Information below applies only to products listed. Refer to product packaging specific  Instructions. Contact child’s primary care provider for questions. Use only the dosing device (dosing syringe or dosing cup) that came with the product.  Acetaminophen/Tylenol® Dosing  You may give Acetaminophen every 4 to 6 hours as needed for pain or fever.   Do NOT give more than 5 doses in any 24-hour period, including other Acetaminophen-containing products.  Children's Oral Suspension = 160 mg/ 5mL  Children’s Strength Chewables= 160 mg  Regular Strength Caplet = 325 mg  Extra Strength Caplet = 500 mg If an actual or suspected overdose occurs, contact Poison Control at [phone]        Ibuprofen/Advil®/Motrin® Dosing  You may give your child Ibuprofen every 6 to 8 hours as needed for pain or fever.   Do NOT give more than 4 doses in a 24-hour period.  Do NOT give Ibuprofen to children under 6 months of age unless advised by your doctor.  Infant concentrated drops = 50 mg/1.25 mL  Children's suspension = 100 mg/5 mL  Children's chewable = 100 mg  Ibuprofen caplets = 200 mg  Caution: Infant and Child products differ in strength. Online product dosing: https://www.tylenol[employer]/safety-dosing/tylenol-dosage-for-children-infants  https://www.motrin.com/children-infants/dosing-charts             Approved by  Pediatric Department Chairs, August 4th 2022  
yes

## 2024-11-14 NOTE — PROGRESS NOTE ADULT - ASSESSMENT
A 89-year-old female with multiple comorbidities presents with acute weakness, confusion, and fever following recent treatment for a urinary tract infection.    SIRS vs Sepsis [ fever, leucocytosis, no clear source of infection]  ?Failed OP treatment if UTI  vs diarrhoea/colitis vs pnuemonia  - s/p IV meropenem, changed to zosyn today by ID  - f.u cultures  - f/u stool w/u  - UA neg  - RVP neg  - CXR  Cardiomegaly with pulmonary venous congestion and suspected interstitial edema. Retrocardiac lucency on the left is most likely due to moderate to large hiatus hernia. No evidence of pneumonia.  - DC LR. encouraged PO hydration. as CCR reported interstitial edema  - monitor cbc and temp.   - f/u CTc/a/p  - ID cx noted and appreciated    Confusion and weakness likely multifactorial: infection, meds,  electrolytes abnormality. CKD3, hypothyroid,  ?CM/pulm HTN, malnutrition  underlying dementia  - MS is currently baseline per daughter  - rales on bases.  - CXR  Cardiomegaly with pulmonary venous congestion and suspected interstitial edema.   - check ProBNP  - f/u CT chest  - TTE  -  I and OS  - daily weight  - patient is on multiple psych meds     CKD3  - monitor  - avoid nephrotoxins    Hypokalemia  - resolved  - monitor K and Mg    COPD: chronic, stable  - no sigh of acute exacerbation  - not hypoxic  -duoneb PRN    HTN  - not on meds  - monitor    HLD  - atorvastatin    Hypothyroid  - c/w LT4  - check TSH      Mood disorder/insomnia  - c.w home psych meds    Debility  - PT eval  - fall precautions    Severe protein-calorie malnutrition.  - nutrition is following      GOC: DNR/DNIMEGAN reconciled and signed      updated daughter Michaelle  at bedside

## 2024-11-14 NOTE — DIETITIAN INITIAL EVALUATION ADULT - PERTINENT MEDS FT
MEDICATIONS  (STANDING):  atorvastatin 40 milliGRAM(s) Oral at bedtime  enoxaparin Injectable 40 milliGRAM(s) SubCutaneous every 24 hours  gabapentin 200 milliGRAM(s) Oral at bedtime  influenza  Vaccine (HIGH DOSE) 0.5 milliLiter(s) IntraMuscular once  lactated ringers. 1000 milliLiter(s) (100 mL/Hr) IV Continuous <Continuous>  levothyroxine 75 MICROGram(s) Oral daily  meropenem  IVPB 1000 milliGRAM(s) IV Intermittent every 8 hours  mirtazapine 7.5 milliGRAM(s) Oral at bedtime  multivitamin 1 Tablet(s) Oral daily  pantoprazole    Tablet 40 milliGRAM(s) Oral before breakfast  tamsulosin 0.4 milliGRAM(s) Oral at bedtime    MEDICATIONS  (PRN):  acetaminophen     Tablet .. 650 milliGRAM(s) Oral every 6 hours PRN Temp greater or equal to 38C (100.4F), Mild Pain (1 - 3)  aluminum hydroxide/magnesium hydroxide/simethicone Suspension 30 milliLiter(s) Oral every 4 hours PRN Dyspepsia  melatonin 3 milliGRAM(s) Oral at bedtime PRN Insomnia  ondansetron Injectable 4 milliGRAM(s) IV Push every 8 hours PRN Nausea and/or Vomiting

## 2024-11-14 NOTE — DIETITIAN INITIAL EVALUATION ADULT - NS FNS DIET ORDER
Regular Diet (IDDSI Level 7) w/ Thin Liquids (IDDSI Level 0)   Recommend Initiate Ensure Plus High Protein 8oz PO BID  Education Provided on Need for Supplementation

## 2024-11-14 NOTE — CONSULT NOTE ADULT - SUBJECTIVE AND OBJECTIVE BOX
HPI:   Patient is a 89y female with cad, htn, lives home with an Aide. She developed smelly urine last week with report of positive dips stick so was given macrobid at an [facility]. The urine got a bit better but she then became very weak and confused over the past day pta and some diarrhea hence came to hospital. She has not had any pain anywhere, no gross hematuria or dysuria reported, no vomiting and she is acting like herself. She has been eating fine as well. No recent travel or hospital stay.     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 3.375 Gram(s) IV Intermittent once  piperacillin/tazobactam IVPB.- 3.375 Gram(s) IV Intermittent once  piperacillin/tazobactam IVPB.. 3.375 Gram(s) IV Intermittent every 8 hours    Other Medications:  acetaminophen     Tablet .. 650 milliGRAM(s) Oral every 6 hours PRN  aluminum hydroxide/magnesium hydroxide/simethicone Suspension 30 milliLiter(s) Oral every 4 hours PRN  atorvastatin 40 milliGRAM(s) Oral at bedtime  enoxaparin Injectable 40 milliGRAM(s) SubCutaneous every 24 hours  gabapentin 200 milliGRAM(s) Oral at bedtime  influenza  Vaccine (HIGH DOSE) 0.5 milliLiter(s) IntraMuscular once  lactated ringers. 1000 milliLiter(s) IV Continuous <Continuous>  levothyroxine 75 MICROGram(s) Oral daily  melatonin 3 milliGRAM(s) Oral at bedtime PRN  mirtazapine 7.5 milliGRAM(s) Oral at bedtime  multivitamin 1 Tablet(s) Oral daily  ondansetron Injectable 4 milliGRAM(s) IV Push every 8 hours PRN  pantoprazole    Tablet 40 milliGRAM(s) Oral before breakfast  tamsulosin 0.4 milliGRAM(s) Oral at bedtime      FAMILY HISTORY:  Family history of myocardial infarction  Brother    Family history of breast cancer  Mother        SOCIAL HISTORY:  Smoking: [ ]Yes [x ]No  ETOH: [ ]Yes [ x]No  Drug Use: [ ]Yes [x ]No  Marriedx [ ] Single[ ]    T(F): 100 (11-14-24 @ 13:22), Max: 100 (11-14-24 @ 13:22)  HR: 96 (11-14-24 @ 13:22)  BP: 127/75 (11-14-24 @ 13:22)  RR: 15 (11-14-24 @ 13:22)  SpO2: 92% (11-14-24 @ 13:22)  Wt(kg): --    PHYSICAL EXAM:  General: alert, no acute distress  Eyes:  anicteric, no conjunctival injection, no discharge  Oropharynx: no lesions or injection 	  Neck: supple, without adenopathy  Lungs: basilar rales to auscultation  Heart: regular rate and rhythm; no murmur, rubs or gallops  Abdomen: soft, nondistended, nontender, without mass or organomegaly  Skin: no lesions  Extremities: no clubbing, cyanosis, or edema  Neurologic: alert, , moves all extremities  back very superficial ulcer above gluteal area  LAB RESULTS:                        10.8   12.88 )-----------( 194      ( 14 Nov 2024 05:39 )             32.9     11-14    137  |  103  |  21  ----------------------------<  101[H]  3.7   |  27  |  0.90    Ca    8.7      14 Nov 2024 05:39    TPro  5.7[L]  /  Alb  2.0[L]  /  TBili  0.9  /  DBili  x   /  AST  25  /  ALT  22  /  AlkPhos  114  11-14    LIVER FUNCTIONS - ( 14 Nov 2024 05:39 )  Alb: 2.0 g/dL / Pro: 5.7 g/dL / ALK PHOS: 114 U/L / ALT: 22 U/L / AST: 25 U/L / GGT: x           Urinalysis Basic - ( 14 Nov 2024 05:39 )    Color: x / Appearance: x / SG: x / pH: x  Gluc: 101 mg/dL / Ketone: x  / Bili: x / Urobili: x   Blood: x / Protein: x / Nitrite: x   Leuk Esterase: x / RBC: x / WBC x   Sq Epi: x / Non Sq Epi: x / Bacteria: x        MICROBIOLOGY:  RECENT CULTURES:        RADIOLOGY REVIEWED:    ACC: [id number] EXAM:  XR CHEST PORTABLE URGENT 1V   ORDERED BY:  RODRIGUEZ MEDINA     PROCEDURE DATE:  11/13/2024          INTERPRETATION:  An AP portable chest radiograph was performed for sepsis.    Comparison is made to 8/31/2023.    The cardiacsilhouette is enlarged with atherosclerotic aorta and   pulmonary venous congestion including suspected interstitial edema. There   is retrocardiac lucency consistent with a moderate to large hiatus hernia   and is unchanged. No focal infiltrates areseen. There is no   pneumothorax. There are no pleural effusions. No other hilar or   mediastinal abnormality is detected. The bony thorax remains unchanged.    IMPRESSION:  1. Cardiomegaly with pulmonary venous congestion and suspected   interstitial edema.  2. Retrocardiac lucency on the left is most likely due to moderate to   large hiatus hernia.  3. No evidence of pneumonia.    --- End of Report ---          Impression: Elderly woman from home who took course of macrobid ending day of admission for a cystitis, admitted for new onset lethargy, haley, found to have fever on arrival, having some loose stools of late. Patient denies any pain, has been eating ok, no vomiting o cough or report of dysuria. Unclear what is causing current febrile illness. It could be a reaction to macrobid, could be incompletely treated pyelo , could be pneumonia since has basilar rales.   Diarrhea also may suggest c dif or other gi infection    Recommendations:  will cover with zosyn  will check stool studies, f/u blood and urine cx  ct cap  further w/u/tx to be determined.  HPI:   Patient is a 89y female with cad, htn, lives home with an Aide. She developed smelly urine last week with report of positive dips stick so was given macrobid at an [facility]. SHe had no dysuria reported and no fever or flank pain . The urine got a bit better but she then became very weak and confused over the past day pta and some diarrhea hence came to hospital. She has not had any pain anywhere, no gross hematuria or dysuria reported, no vomiting and she is acting like herself now. She has been eating fine as well. No recent travel or hospital stay. She was found here to have some fever and leukocytosis.     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 3.375 Gram(s) IV Intermittent once  piperacillin/tazobactam IVPB.- 3.375 Gram(s) IV Intermittent once  piperacillin/tazobactam IVPB.. 3.375 Gram(s) IV Intermittent every 8 hours    Other Medications:  acetaminophen     Tablet .. 650 milliGRAM(s) Oral every 6 hours PRN  aluminum hydroxide/magnesium hydroxide/simethicone Suspension 30 milliLiter(s) Oral every 4 hours PRN  atorvastatin 40 milliGRAM(s) Oral at bedtime  enoxaparin Injectable 40 milliGRAM(s) SubCutaneous every 24 hours  gabapentin 200 milliGRAM(s) Oral at bedtime  influenza  Vaccine (HIGH DOSE) 0.5 milliLiter(s) IntraMuscular once  lactated ringers. 1000 milliLiter(s) IV Continuous <Continuous>  levothyroxine 75 MICROGram(s) Oral daily  melatonin 3 milliGRAM(s) Oral at bedtime PRN  mirtazapine 7.5 milliGRAM(s) Oral at bedtime  multivitamin 1 Tablet(s) Oral daily  ondansetron Injectable 4 milliGRAM(s) IV Push every 8 hours PRN  pantoprazole    Tablet 40 milliGRAM(s) Oral before breakfast  tamsulosin 0.4 milliGRAM(s) Oral at bedtime      FAMILY HISTORY:  Family history of myocardial infarction  Brother    Family history of breast cancer  Mother        SOCIAL HISTORY:  Smoking: [ ]Yes [x ]No  ETOH: [ ]Yes [ x]No  Drug Use: [ ]Yes [x ]No  Marriedx [ ] Single[ ]    T(F): 100 (11-14-24 @ 13:22), Max: 100 (11-14-24 @ 13:22)  HR: 96 (11-14-24 @ 13:22)  BP: 127/75 (11-14-24 @ 13:22)  RR: 15 (11-14-24 @ 13:22)  SpO2: 92% (11-14-24 @ 13:22)  Wt(kg): --    PHYSICAL EXAM:  General: alert, no acute distress  Eyes:  anicteric, no conjunctival injection, no discharge  Oropharynx: no lesions or injection 	  Neck: supple, without adenopathy  Lungs: basilar rales to auscultation  Heart: regular rate and rhythm; no murmur, rubs or gallops  Abdomen: soft, nondistended, nontender, without mass or organomegaly  Skin: no lesions  Extremities: no clubbing, cyanosis, or edema  Neurologic: alert, , moves all extremities  back very superficial ulcer above gluteal area  LAB RESULTS:                        10.8   12.88 )-----------( 194      ( 14 Nov 2024 05:39 )             32.9     11-14    137  |  103  |  21  ----------------------------<  101[H]  3.7   |  27  |  0.90    Ca    8.7      14 Nov 2024 05:39    TPro  5.7[L]  /  Alb  2.0[L]  /  TBili  0.9  /  DBili  x   /  AST  25  /  ALT  22  /  AlkPhos  114  11-14    LIVER FUNCTIONS - ( 14 Nov 2024 05:39 )  Alb: 2.0 g/dL / Pro: 5.7 g/dL / ALK PHOS: 114 U/L / ALT: 22 U/L / AST: 25 U/L / GGT: x           Urinalysis Basic - ( 14 Nov 2024 05:39 )    Color: x / Appearance: x / SG: x / pH: x  Gluc: 101 mg/dL / Ketone: x  / Bili: x / Urobili: x   Blood: x / Protein: x / Nitrite: x   Leuk Esterase: x / RBC: x / WBC x   Sq Epi: x / Non Sq Epi: x / Bacteria: x        MICROBIOLOGY:  RECENT CULTURES:        RADIOLOGY REVIEWED:    ACC: [id number] EXAM:  XR CHEST PORTABLE URGENT 1V   ORDERED BY:  RODRIGUEZ MEDINA     PROCEDURE DATE:  11/13/2024          INTERPRETATION:  An AP portable chest radiograph was performed for sepsis.    Comparison is made to 8/31/2023.    The cardiacsilhouette is enlarged with atherosclerotic aorta and   pulmonary venous congestion including suspected interstitial edema. There   is retrocardiac lucency consistent with a moderate to large hiatus hernia   and is unchanged. No focal infiltrates areseen. There is no   pneumothorax. There are no pleural effusions. No other hilar or   mediastinal abnormality is detected. The bony thorax remains unchanged.    IMPRESSION:  1. Cardiomegaly with pulmonary venous congestion and suspected   interstitial edema.  2. Retrocardiac lucency on the left is most likely due to moderate to   large hiatus hernia.  3. No evidence of pneumonia.    --- End of Report ---          Impression: Elderly woman from home who took course of macrobid ending day of admission for a cystitis, was not really symptomatic except the urine smelled bad. Yesterday she was admitted for new onset lethargy, weak, , found to have fever on arrival, having some loose stools of late. Patient denies any pain, has been eating ok, no vomiting o cough or report of dysuria. Unclear what is causing current febrile illness. It could be a reaction to macrobid, could be incompletely treated pyelo , could be pneumonia since has basilar rales.   Diarrhea also may suggest c dif or other gi infection    Recommendations:  will cover with zosyn  will check stool studies, f/u blood and urine cx  ct cap  further w/u/tx to be determined.

## 2024-11-14 NOTE — DIETITIAN INITIAL EVALUATION ADULT - PERTINENT LABORATORY DATA
11-14    137  |  103  |  21  ----------------------------<  101[H]  3.7   |  27  |  0.90    Ca    8.7      14 Nov 2024 05:39    TPro  5.7[L]  /  Alb  2.0[L]  /  TBili  0.9  /  DBili  x   /  AST  25  /  ALT  22  /  AlkPhos  114  11-14

## 2024-11-15 ENCOUNTER — TRANSCRIPTION ENCOUNTER (OUTPATIENT)
Age: 89
End: 2024-11-15

## 2024-11-15 VITALS
DIASTOLIC BLOOD PRESSURE: 85 MMHG | TEMPERATURE: 98 F | HEART RATE: 86 BPM | RESPIRATION RATE: 18 BRPM | OXYGEN SATURATION: 96 % | SYSTOLIC BLOOD PRESSURE: 169 MMHG

## 2024-11-15 LAB
ALBUMIN SERPL ELPH-MCNC: 2.4 G/DL — LOW (ref 3.3–5)
ALP SERPL-CCNC: 125 U/L — HIGH (ref 40–120)
ALT FLD-CCNC: 23 U/L — SIGNIFICANT CHANGE UP (ref 10–45)
ANION GAP SERPL CALC-SCNC: 9 MMOL/L — SIGNIFICANT CHANGE UP (ref 5–17)
AST SERPL-CCNC: 29 U/L — SIGNIFICANT CHANGE UP (ref 10–40)
BASOPHILS # BLD AUTO: 0.02 K/UL — SIGNIFICANT CHANGE UP (ref 0–0.2)
BASOPHILS NFR BLD AUTO: 0.2 % — SIGNIFICANT CHANGE UP (ref 0–2)
BILIRUB SERPL-MCNC: 0.8 MG/DL — SIGNIFICANT CHANGE UP (ref 0.2–1.2)
BUN SERPL-MCNC: 15 MG/DL — SIGNIFICANT CHANGE UP (ref 7–23)
CALCIUM SERPL-MCNC: 9.2 MG/DL — SIGNIFICANT CHANGE UP (ref 8.4–10.5)
CHLORIDE SERPL-SCNC: 102 MMOL/L — SIGNIFICANT CHANGE UP (ref 96–108)
CO2 SERPL-SCNC: 27 MMOL/L — SIGNIFICANT CHANGE UP (ref 22–31)
CREAT SERPL-MCNC: 0.86 MG/DL — SIGNIFICANT CHANGE UP (ref 0.5–1.3)
CULTURE RESULTS: SIGNIFICANT CHANGE UP
EGFR: 62 ML/MIN/1.73M2 — SIGNIFICANT CHANGE UP
EOSINOPHIL # BLD AUTO: 0.4 K/UL — SIGNIFICANT CHANGE UP (ref 0–0.5)
EOSINOPHIL NFR BLD AUTO: 4.9 % — SIGNIFICANT CHANGE UP (ref 0–6)
FOLATE SERPL-MCNC: >20 NG/ML — SIGNIFICANT CHANGE UP
GLUCOSE BLDC GLUCOMTR-MCNC: 113 MG/DL — HIGH (ref 70–99)
GLUCOSE SERPL-MCNC: 102 MG/DL — HIGH (ref 70–99)
HCT VFR BLD CALC: 35.2 % — SIGNIFICANT CHANGE UP (ref 34.5–45)
HGB BLD-MCNC: 11.7 G/DL — SIGNIFICANT CHANGE UP (ref 11.5–15.5)
IMM GRANULOCYTES NFR BLD AUTO: 0.4 % — SIGNIFICANT CHANGE UP (ref 0–0.9)
LYMPHOCYTES # BLD AUTO: 1.38 K/UL — SIGNIFICANT CHANGE UP (ref 1–3.3)
LYMPHOCYTES # BLD AUTO: 16.7 % — SIGNIFICANT CHANGE UP (ref 13–44)
MAGNESIUM SERPL-MCNC: 1.7 MG/DL — SIGNIFICANT CHANGE UP (ref 1.6–2.6)
MCHC RBC-ENTMCNC: 30.2 PG — SIGNIFICANT CHANGE UP (ref 27–34)
MCHC RBC-ENTMCNC: 33.2 G/DL — SIGNIFICANT CHANGE UP (ref 32–36)
MCV RBC AUTO: 91 FL — SIGNIFICANT CHANGE UP (ref 80–100)
MONOCYTES # BLD AUTO: 0.45 K/UL — SIGNIFICANT CHANGE UP (ref 0–0.9)
MONOCYTES NFR BLD AUTO: 5.5 % — SIGNIFICANT CHANGE UP (ref 2–14)
NEUTROPHILS # BLD AUTO: 5.96 K/UL — SIGNIFICANT CHANGE UP (ref 1.8–7.4)
NEUTROPHILS NFR BLD AUTO: 72.3 % — SIGNIFICANT CHANGE UP (ref 43–77)
NRBC # BLD: 0 /100 WBCS — SIGNIFICANT CHANGE UP (ref 0–0)
NT-PROBNP SERPL-SCNC: 4195 PG/ML — HIGH (ref 0–300)
PLATELET # BLD AUTO: 231 K/UL — SIGNIFICANT CHANGE UP (ref 150–400)
POTASSIUM SERPL-MCNC: 3.2 MMOL/L — LOW (ref 3.5–5.3)
POTASSIUM SERPL-SCNC: 3.2 MMOL/L — LOW (ref 3.5–5.3)
PROCALCITONIN SERPL-MCNC: 0.19 NG/ML — HIGH
PROT SERPL-MCNC: 6.6 G/DL — SIGNIFICANT CHANGE UP (ref 6–8.3)
RBC # BLD: 3.87 M/UL — SIGNIFICANT CHANGE UP (ref 3.8–5.2)
RBC # FLD: 15 % — HIGH (ref 10.3–14.5)
SODIUM SERPL-SCNC: 138 MMOL/L — SIGNIFICANT CHANGE UP (ref 135–145)
SPECIMEN SOURCE: SIGNIFICANT CHANGE UP
TROPONIN I, HIGH SENSITIVITY RESULT: 36 NG/L — SIGNIFICANT CHANGE UP
TSH SERPL-MCNC: 4.6 UIU/ML — HIGH (ref 0.36–3.74)
VIT B12 SERPL-MCNC: 499 PG/ML — SIGNIFICANT CHANGE UP (ref 232–1245)
WBC # BLD: 8.24 K/UL — SIGNIFICANT CHANGE UP (ref 3.8–10.5)
WBC # FLD AUTO: 8.24 K/UL — SIGNIFICANT CHANGE UP (ref 3.8–10.5)

## 2024-11-15 PROCEDURE — 99239 HOSP IP/OBS DSCHRG MGMT >30: CPT

## 2024-11-15 RX ORDER — ALBUTEROL 90 MCG
2 AEROSOL (GRAM) INHALATION
Qty: 1 | Refills: 0
Start: 2024-11-15

## 2024-11-15 RX ORDER — AMOXICILLIN AND CLAVULANATE POTASSIUM 600; 42.9 MG/5ML; MG/5ML
500 POWDER, FOR SUSPENSION ORAL
Qty: 7 | Refills: 0
Start: 2024-11-15

## 2024-11-15 RX ORDER — DAPAGLIFLOZIN 10 MG/1
10 TABLET, FILM COATED ORAL DAILY
Refills: 0 | Status: DISCONTINUED | OUTPATIENT
Start: 2024-11-15 | End: 2024-11-15

## 2024-11-15 RX ORDER — OLANZAPINE 20 MG/1
2.5 TABLET ORAL ONCE
Refills: 0 | Status: COMPLETED | OUTPATIENT
Start: 2024-11-15 | End: 2024-11-15

## 2024-11-15 RX ORDER — POTASSIUM CHLORIDE 10 MEQ
40 TABLET, EXTENDED RELEASE ORAL ONCE
Refills: 0 | Status: COMPLETED | OUTPATIENT
Start: 2024-11-15 | End: 2024-11-15

## 2024-11-15 RX ORDER — GABAPENTIN 300 MG/1
2 CAPSULE ORAL
Refills: 0 | DISCHARGE

## 2024-11-15 RX ORDER — DAPAGLIFLOZIN 10 MG/1
1 TABLET, FILM COATED ORAL
Qty: 30 | Refills: 0
Start: 2024-11-15 | End: 2024-12-14

## 2024-11-15 RX ADMIN — PIPERACILLIN AND TAZOBACTAM 25 GRAM(S): .5; 4 INJECTION, POWDER, LYOPHILIZED, FOR SOLUTION INTRAVENOUS at 06:19

## 2024-11-15 RX ADMIN — PIPERACILLIN AND TAZOBACTAM 25 GRAM(S): .5; 4 INJECTION, POWDER, LYOPHILIZED, FOR SOLUTION INTRAVENOUS at 13:09

## 2024-11-15 RX ADMIN — OLANZAPINE 2.5 MILLIGRAM(S): 20 TABLET ORAL at 10:46

## 2024-11-15 NOTE — DISCHARGE NOTE PROVIDER - HOSPITAL COURSE
The patient, a 89-year-old female, was brought in by her family due to acute weakness and increased confusion observed on the day of the encounter. She was previously treated for a urinary tract infection one week ago with Macrobid, with the last dose taken the evening before the ER visit. On the morning of presentation, she was exceptionally weak and unable to get out of bed. Her family noted a decline in her ability to perform activities of daily living, requiring extensive assistance for transfers involving two people, whereas she typically uses a walker at baseline. The patient was also noted to have a fever of 102°F. Lab results indicated hypokalemia. She was administered meropenem, potassium supplementation, and magnesium supplementation. Additionally, she has a history of acute cystitis with hematuria. Patient was admitted and treated for severe sepsis, pneumonia. ID consulted.     < from: CT Abdomen and Pelvis w/ Oral Cont (11.14.24 @ 17:17) >    IMPRESSION:  *  Bilateral pulmonary fibrotic changes, increased compared to prior   examination.  *  Bibasilar consolidation consistent with atelectasis and/or pneumonia.  *  Bilateral upper lobe groundglass opacities, which may be   infectious/inflammatory etiology. Follow-up imaging is recommended to   assess for resolution.  *  Trace bilateral pleural effusions.  *  Mediastinal adenopathy.  *  Diverticulosis, without evidence of acute diverticulitis.    < end of copied text >    Echo done.     < from: TTE Echo Complete w/o Contrast w/ Doppler (11.14.24 @ 16:06) >    CONCLUSIONS:     1. Left ventricular cavity is normal in size. The LVEF is 40-45% with   global hypokinesis and paradoxical septal motion.   2. There is moderate (grade 2) left ventricular diastolic dysfunction.   Analysis of left ventricular diastolic function and filling pressure is   made challenging by the presence of moderate mitral regurgitation.  3. Normal right ventricular cavity size.   4. Left atrium is severely dilated.   5. The right atrium is normal in size.   6. Moderate mitral regurgitation. The mitral regurgitation jet   originates through the posterior leaflet.   7. Structurally normal pulmonic valve with normal leaflet excursion.   8. No pericardial effusion seen.   9. The main pulmonary artery is normal in size, origin and position with   normal bifurcation into the left and right pulmonary arteries.  10. Structurally normal tricuspid valve with normal leaflet excursion.   Moderate tricuspid regurgitation.  11. Aortic root at the sinuses of Valsalva is normal in size, measuring   3.00 cm (indexed 1.96 cm/m²).  12. Mild left ventricular hypertrophy.  13. The inferior vena cava is normal in size measuring 1.60 cm in   diameter, (normal <2.1cm) with abnormal inspiratory collapse (abnormal   <50%) consistent with mildly elevated right atrial pressure (  8, range 5-10mmHg).    < end of copied text >    Daughter reported h/o CAD with stent. follows with Dr. Foote OP. has appointment on 11/18. patient has no sign of volume overload. daughter preferred patient being discharged with farxiga only and her cardiologist start other GDMTs like metoprolol or coreg, entrsto/ACEI/ARBs outpatient. she was on metoprolol in the past which was dced. patient is allergic to sulfa drugs. discussed with daughter about lasix/Bumex. would prefer to continue with farxiga for now and discuss with cardio OP. she wants take patient today if possible to avoid hospital acquired delirium as Patient is already agitated once controlled with Zyprexa.     30 Day Supply through [facility] to B[facility]: no. daughter would prefer to  from pharmacy. if not covered, will get PA by PCP or cardio. meds sent to patient's pharmacy. clinical pharmacist has verified coverage    GOC:   •	Code Status : DNR/DNI  •	Summary of Goals of Care Conversation/ what matters most: going back home asap    Source of Infection: pneumonia  Antibiotic / Last Day: 11/18. Augmentin    Discharging Provider: Kylee Resendiz MD  Contact Info: [phone] - call or text. Also available on Teams     Outpatient Provider: Sign out given: PCP Dr. Polo, cardiology:  Dr. Foote.

## 2024-11-15 NOTE — PHYSICAL THERAPY INITIAL EVALUATION ADULT - ADDITIONAL COMMENTS
lives in [place] entry [place] home with 24/7 HHA; required min assist without device PTA; owns RW and WC

## 2024-11-15 NOTE — DISCHARGE NOTE PROVIDER - NSDCMRMEDTOKEN_GEN_ALL_CORE_FT
atorvastatin 40 mg oral tablet: 1 tab(s) orally once a day (at bedtime)  Gabapentin: 2 once a day (at bedtime) 200mg dose  levothyroxine 75 mcg (0.075 mg) oral tablet: 1 tab(s) orally once a day  mirtazapine 7.5 mg oral tablet: 1 tab(s) orally once a day (at bedtime)  Multiple Vitamins oral tablet: 1 tab(s) orally once a day  pantoprazole 40 mg oral delayed release tablet: 1 tab(s) orally once a day (before a meal)  tamsulosin 0.4 mg oral capsule: 1 cap(s) orally once a day (at bedtime)   amoxicillin-clavulanate 500 mg-125 mg oral tablet: 500 milligram(s) orally every 12 hours  atorvastatin 40 mg oral tablet: 1 tab(s) orally once a day (at bedtime)  dapagliflozin 10 mg oral tablet: 1 tab(s) orally once a day  gabapentin 100 mg oral capsule: 2 cap(s) orally once a day  levothyroxine 75 mcg (0.075 mg) oral tablet: 1 tab(s) orally once a day  mirtazapine 7.5 mg oral tablet: 1 tab(s) orally once a day (at bedtime)  Multiple Vitamins oral tablet: 1 tab(s) orally once a day  pantoprazole 40 mg oral delayed release tablet: 1 tab(s) orally once a day (before a meal)  ProAir Digihaler 90 mcg/inh inhalation powder: 2 puff(s) inhaled every 6 hours as needed for  shortness of breath and/or wheezing  tamsulosin 0.4 mg oral capsule: 1 cap(s) orally once a day (at bedtime)

## 2024-11-15 NOTE — DISCHARGE NOTE NURSING/CASE MANAGEMENT/SOCIAL WORK - PATIENT PORTAL LINK FT
You can access the FollowMyHealth Patient Portal offered by [facility] by registering at the following website: [email or web address]. By joining [email or web address] FollowMyHealth portal, you will also be able to view your health information using other applications (apps) compatible with our system.

## 2024-11-15 NOTE — DISCHARGE NOTE NURSING/CASE MANAGEMENT/SOCIAL WORK - FINANCIAL ASSISTANCE
[facility] provides services at a reduced cost to those who are determined to be eligible through [facility]’s financial assistance program. Information regarding [facility]’s financial assistance program can be found by going to [email or web address] or by calling [phone].

## 2024-11-15 NOTE — DISCHARGE NOTE PROVIDER - CARE PROVIDER_API CALL
Bing Polo  [facility] Medicine  [address] [place] [address]  Phone: [phone]  Fax: [phone]  Follow Up Time:     Mark Foote  Cardiology  [address], [address]  [place] [address]  Phone: [phone]  Fax: [phone]  Follow Up Time:     Dwight Farias  Infectious Disease  [address], [address]  [place] [address]  Phone: [phone]  Fax: [phone]  Follow Up Time:     Marian Juarez  Pulmonary Disease  [address], [address]  [place] [address]  Phone: [phone]  Fax: [phone]  Follow Up Time:

## 2024-11-15 NOTE — DISCHARGE NOTE NURSING/CASE MANAGEMENT/SOCIAL WORK - NSDCPEFALRISK_GEN_ALL_CORE
For information on Fall & Injury Prevention, visit: [email or web address] OR  [email or web address] OR  https://www.cdc.gov/steadi/patient.html

## 2024-11-15 NOTE — DISCHARGE NOTE PROVIDER - PROVIDER TOKENS
PROVIDER:[TOKEN:[[id number]]],PROVIDER:[TOKEN:[[id number]]],PROVIDER:[TOKEN:[[id number]]],PROVIDER:[TOKEN:[[id number]]]

## 2024-11-15 NOTE — PROGRESS NOTE ADULT - ASSESSMENT
A 89-year-old female with multiple comorbidities presents with acute weakness, confusion, and fever following recent treatment for a urinary tract infection.    Severe Sepsis POA [ Fever, Leucocytosis, Mild MYNOR, PNA on CT]   Community acquired Pneumonia  diarrhoea episode resolved, no diarrhoea since patient is in ER. BM today once. soft, formed per RN  - s/p IV meropenem, changed to zosyn today by ID  - BC and UC neg  - stoo w/u not sent  - UA neg  - RVP neg  - CXR  Cardiomegaly with pulmonary venous congestion and suspected interstitial edema. Retrocardiac lucency on the left is most likely due to moderate to large hiatus hernia. No evidence of pneumonia.    < from: CT Chest No Cont (11.14.24 @ 17:14) >    IMPRESSION:  *  Bilateral pulmonary fibrotic changes, increased compared to prior   examination.  *  Bibasilar consolidation consistent with atelectasis and/or pneumonia.  *  Bilateral upper lobe groundglass opacities, which may be   infectious/inflammatory etiology. Follow-up imaging is recommended to   assess for resolution.  *  Trace bilateral pleural effusions.  *  Mediastinal adenopathy.  *  Diverticulosis, without evidence of acute diverticulitis.    < end of copied text >    - DC LR. encouraged PO hydration. as CCR reported interstitial edema  - monitor cbc and temp.   - ID f/u noted and appreciated. suggested to DC with PO Augmentin for 3 more days. f/u ID OP    Pulm fibrosis/GGO in CT  - Pulm cx OP for pulm findings above  - Needs repeat CT in 4-6 weeks ensure resolution    Confusion and weakness likely multifactorial: infection, meds,  electrolytes abnormality. CKD3, hypothyroid, Chronic CHF, malnutrition  underlying dementia  - MS is currently baseline per daughter. agiated this am. controlled with zyprexa  - rales on bases.  - CXR  Cardiomegaly with pulmonary venous congestion and suspected interstitial edema.   - CT above  - ProBNP elevated. likely related to IVF  - TTE:    < from: TTE Echo Complete w/o Contrast w/ Doppler (11.14.24 @ 16:06) >  CONCLUSIONS:     1. Left ventricular cavity is normal in size. The LVEF is 40-45% with   global hypokinesis and paradoxical septal motion.   2. There is moderate (grade 2) left ventricular diastolic dysfunction.   Analysis of left ventricular diastolic function and filling pressure is   made challenging by the presence of moderate mitral regurgitation.  3. Normal right ventricular cavity size.   4. Left atrium is severely dilated.   5. The right atrium is normal in size.   6. Moderate mitral regurgitation. The mitral regurgitation jet   originates through the posterior leaflet.   7. Structurally normal pulmonic valve with normal leaflet excursion.   8. No pericardial effusion seen.   9. The main pulmonary artery is normal in size, origin and position with   normal bifurcation into the left and right pulmonary arteries.  10. Structurally normal tricuspid valve with normal leaflet excursion.   Moderate tricuspid regurgitation.  11. Aortic root at the sinuses of Valsalva is normal in size, measuring   3.00 cm (indexed 1.96 cm/m²).  12. Mild left ventricular hypertrophy.  13. The inferior vena cava is normal in size measuring 1.60 cm in   diameter, (normal <2.1cm) with abnormal inspiratory collapse (abnormal   <50%) consistent with mildly elevated right atrial pressure (  8, range 5-10mmHg).    < end of copied text >    -  I and OS  - daily weight  - patient is on multiple psych meds     CKD3  - monitor  - avoid nephrotoxins    Hypokalemia  - resolved  - monitor K and Mg    COPD: chronic, stable  - no sigh of acute exacerbation  - not hypoxic  -duoneb PRN    HTN  - not on meds  - monitor    HLD  - atorvastatin    Hypothyroid  - c/w LT4  - check TSH      Mood disorder/insomnia  - c.w home psych meds    Debility  - PT eval  - fall precautions    Severe protein-calorie malnutrition.  - nutrition is following      GOC: DNR/DNI, MOLST reconciled and signed      updated daughter Michaelle  at bedside A 89-year-old female with multiple comorbidities presents with acute weakness, confusion, and fever following recent treatment for a urinary tract infection.    Severe Sepsis POA [ Fever, Leucocytosis, Mild MYNOR, PNA on CT]   Community acquired Pneumonia  diarrhoea episode resolved, no diarrhoea since patient is in ER. BM today once. soft, formed per RN  - s/p IV meropenem, changed to zosyn today by ID  - BC and UC neg  - stoo w/u not sent  - UA neg  - RVP neg  - CXR  Cardiomegaly with pulmonary venous congestion and suspected interstitial edema. Retrocardiac lucency on the left is most likely due to moderate to large hiatus hernia. No evidence of pneumonia.    < from: CT Chest No Cont (11.14.24 @ 17:14) >    IMPRESSION:  *  Bilateral pulmonary fibrotic changes, increased compared to prior   examination.  *  Bibasilar consolidation consistent with atelectasis and/or pneumonia.  *  Bilateral upper lobe groundglass opacities, which may be   infectious/inflammatory etiology. Follow-up imaging is recommended to   assess for resolution.  *  Trace bilateral pleural effusions.  *  Mediastinal adenopathy.  *  Diverticulosis, without evidence of acute diverticulitis.    < end of copied text >    - DC LR. encouraged PO hydration. as CCR reported interstitial edema  - monitor cbc and temp.   - ID f/u noted and appreciated. suggested to DC with PO Augmentin for 3 more days. f/u ID OP    Pulm fibrosis/GGO in CT  - Pulm cx OP for pulm findings above  - Needs repeat CT in 4-6 weeks ensure resolution    Confusion/acute metabolic encephalopathy resolved  underlying dementia  - MS is currently baseline per daughter. agitated this am. controlled with Zyprexa now at baseline  - c/w home meds    HTN  Chronic combined systolic and diastolic CHF: no sign of volume overload  Elevated BNP: likey due to IVF and chronic CHF  Rales due to pulm fibrosis  s/p CAD s/p remote stent.     - CXR  Cardiomegaly with pulmonary venous congestion and suspected interstitial edema.   - CT: fibrosis  - ProBNP elevated. likely related to IVF, and chornic CHF  - TTE:    < from: TTE Echo Complete w/o Contrast w/ Doppler (11.14.24 @ 16:06) >  CONCLUSIONS:     1. Left ventricular cavity is normal in size. The LVEF is 40-45% with   global hypokinesis and paradoxical septal motion.   2. There is moderate (grade 2) left ventricular diastolic dysfunction.   Analysis of left ventricular diastolic function and filling pressure is   made challenging by the presence of moderate mitral regurgitation.  3. Normal right ventricular cavity size.   4. Left atrium is severely dilated.   5. The right atrium is normal in size.   6. Moderate mitral regurgitation. The mitral regurgitation jet   originates through the posterior leaflet.   7. Structurally normal pulmonic valve with normal leaflet excursion.   8. No pericardial effusion seen.   9. The main pulmonary artery is normal in size, origin and position with   normal bifurcation into the left and right pulmonary arteries.  10. Structurally normal tricuspid valve with normal leaflet excursion.   Moderate tricuspid regurgitation.  11. Aortic root at the sinuses of Valsalva is normal in size, measuring   3.00 cm (indexed 1.96 cm/m²).  12. Mild left ventricular hypertrophy.  13. The inferior vena cava is normal in size measuring 1.60 cm in   diameter, (normal <2.1cm) with abnormal inspiratory collapse (abnormal   <50%) consistent with mildly elevated right atrial pressure (  8, range 5-10mmHg).    < end of copied text >    - GMDT: per daughter patient was metoprolol which was dced by her cardiolgosit for unclear reason. discussed about neds for Farxiga/entresto/lisinispril/Losartan and corg or metoprolol as GDMTs. daughter refused everything except Farxiga as alternative for lasix/bumex as naheed has sulfa allergy.  will send farxiga to Pharmacy. . risk/benefit.alternative discussed. daughter will get PA from PCP or cardiologist Dr. Zazueta if not covered by insurance.     -  I and OS  - daily weight  - discussed about cardiology cx inpatient.. Patient missed appointment with her cardiologist Dr. Burroughs. Daughter would prefer to reschedule in 1 week.     CKD3  - monitor  - avoid nephrotoxins    Hypokalemia  - KCL 40 meq once  - monitor K and Mg.     COPD: chronic, stable  - no sigh of acute exacerbation  - not hypoxic  -duoneb PRN    HTN  - not on meds  - monitor    HLD  - atorvastatin    Hypothyroid  - c/w LT4  - check TSH      Mood disorder/insomnia  - c.w home psych meds    Debility  - PT eval  - fall precautions    Severe protein-calorie malnutrition.  - nutrition is following      GOC: DNR/DNI, MOLST reconciled and signed  Dispo: stable for DC home with close PCP and cardio and ID follow up    Current comorbidities, plan of care, return precautions, fall preventions discussed with the daughter Isela and HHA at bedside.. verbalized understanding and agreed with the plan. All questions were answered at his/her satisfaction.               updated daughter Michaelle  at bedside

## 2024-11-15 NOTE — PROGRESS NOTE ADULT - SUBJECTIVE AND OBJECTIVE BOX
CC: f/u for  pneumonia  Patient reports  she wants to go home  REVIEW OF SYSTEMS:  All other review of systems negative (Comprehensive ROS)    Antimicrobials Day #  :3  piperacillin/tazobactam IVPB.. 3.375 Gram(s) IV Intermittent every 8 hours    Other Medications Reviewed    T(F): 98 (11-15-24 @ 05:56), Max: 100 (11-14-24 @ 13:22)  HR: 86 (11-15-24 @ 05:56)  BP: 169/85 (11-15-24 @ 05:56)  RR: 18 (11-15-24 @ 05:56)  SpO2: 96% (11-15-24 @ 05:56)  Wt(kg): --    PHYSICAL EXAM:  General: alert, no acute distress  Eyes:  anicteric, no conjunctival injection, no discharge  Oropharynx: no lesions or injection 	  Neck: supple, without adenopathy  Lungs: basilar rales  to auscultation  Heart: regular rate and rhythm; no murmur, rubs or gallops  Abdomen: soft, nondistended, nontender, without mass or organomegaly  Skin: no lesions  Extremities: no clubbing, cyanosis, or edema  Neurologic: alert, , moves all extremities    LAB RESULTS:                        11.7   8.24  )-----------( 231      ( 15 Nov 2024 06:19 )             35.2     11-15    138  |  102  |  15  ----------------------------<  102[H]  3.2[L]   |  27  |  0.86    Ca    9.2      15 Nov 2024 06:19  Mg     1.7     11-15    TPro  6.6  /  Alb  2.4[L]  /  TBili  0.8  /  DBili  x   /  AST  29  /  ALT  23  /  AlkPhos  125[H]  11-15    LIVER FUNCTIONS - ( 15 Nov 2024 06:19 )  Alb: 2.4 g/dL / Pro: 6.6 g/dL / ALK PHOS: 125 U/L / ALT: 23 U/L / AST: 29 U/L / GGT: x           Urinalysis Basic - ( 15 Nov 2024 06:19 )    Color: x / Appearance: x / SG: x / pH: x  Gluc: 102 mg/dL / Ketone: x  / Bili: x / Urobili: x   Blood: x / Protein: x / Nitrite: x   Leuk Esterase: x / RBC: x / WBC x   Sq Epi: x / Non Sq Epi: x / Bacteria: x      MICROBIOLOGY:  RECENT CULTURES:  11-13 @ 16:15 Clean Catch Clean Catch (Midstream)     <10,000 CFU/mL Normal Urogenital Bri      11-13 @ 13:15 .Blood BLOOD     No growth at 24 hours          RADIOLOGY REVIEWED:  < from: CT Abdomen and Pelvis w/ Oral Cont (11.14.24 @ 17:17) >    ACC: [id number] EXAM:  CT ABDOMEN AND PELVIS OC   ORDERED BY: LEONARDO CARL     ACC: [id number] EXAM:  CT CHEST   ORDERED BY: LEONARDO CARL     PROCEDURE DATE:  11/14/2024          INTERPRETATION:  CLINICAL INFORMATION: Sepsis. Fever. Diarrhea. Lethargy.    COMPARISON: None.    CONTRAST/COMPLICATIONS:  IV Contrast: Omnipaque 350  30 cc administered   0 cc discarded  Oral Contrast: NONE      PROCEDURE:  CT of the Chest, Abdomen and Pelvis was performed.  Dual phase, arterial and portal venous phase imaging was performed   through the upper abdomen.  Sagittal and coronal reformats were performed.    FINDINGS:  CHEST:  LUNGS AND LARGE AIRWAYS: Patent central airways. Fibrotic changes in the   periphery of both lungs. Bibasilar consolidation consistent with   atelectasis and/or pneumonia. Bilateral upper lobe groundglass opacity   measuring 2.0 x 1.4 cm on the right and 1.0 x 0.9 cm on the left.  PLEURA: Small bilateral pleural effusions.  VESSELS: Within normal limits.  HEART: Cardiomegaly. No pericardial effusion.  MEDIASTINUM AND NAVEEN: Mediastinal adenopathy. For reference:  Paratracheal lymph node measures 1.5 x 1.2 cm (302:76)  CHEST WALL AND LOWER NECK: Within normal limits.    ABDOMEN AND PELVIS:  LIVER: Within normal limits.  BILE DUCTS: Normal caliber.  GALLBLADDER: Within normal limits.  SPLEEN: Within normal limits.  PANCREAS: Within normal limits.  ADRENALS: Within normal limits.  KIDNEYS/URETERS: Right renal cyst measures 2.8 x 2.0 cm. No calculus or   hydronephrosis.    BLADDER: Within normal limits.  REPRODUCTIVE ORGANS: Hysterectomy.    BOWEL: Large hiatal hernia with intrathoracic stomach. No bowel   obstruction. Appendix is normal. Diverticulosis, without evidence of   acute diverticulitis.  PERITONEUM/RETROPERITONEUM: Within normal limits.  VESSELS: Atherosclerotic changes.  LYMPH NODES: No lymphadenopathy.  ABDOMINAL WALL: Within normal limits.  BONES: Osteopenia and degenerative changes. Right femoral intramedullary   kat. Metallic anchors in the right proximal humerus. Old right superior   inferior pubic ramus fractures.    IMPRESSION:  *  Bilateral pulmonary fibrotic changes, increased compared to prior   examination.  *  Bibasilar consolidation consistent with atelectasis and/or pneumonia.  *  Bilateral upper lobe groundglass opacities, which may be   infectious/inflammatory etiology. Follow-up imaging is recommended to   assess for resolution.  *  Trace bilateral pleural effusions.  *  Mediastinal adenopathy.  *  Diverticulosis, without evidence of acute diverticulitis.        --- End of Report ---      < end of copied text >              Assessment:   89y female with cad, htn, lives home with an Aide. She developed smelly urine last week with report of positive dips stick so [place] [facility] [place] SHe had no dysuria reported and no fever or flank pain . The urine got a bit better but she then became very weak and confused over the past day pta and some diarrhea hence came to hospital. She has not had any pain anywhere, no gross hematuria or dysuria reported, no vomiting and she is acting like herself now. She has been eating fine as well. No recent travel or hospital stay. She was found here to have some fever and leukocytosis. She has negative urine and blood cx. No further diarrhea. Scan of body shows some infiltrate so suspect maybe pneumonia. She still may have had rxn to macrobid so told daughter not to let her take it in the future.     Plan:  can change to po augmentin 500mg po bid for 3 more days after today  f/u in my office  no further macrobid going forward
Medicine Progress Note    Patient is a 89y old  Female who presents with a chief complaint of Weakness and confusion in a 89-year-old female. (14 Nov 2024 13:27)      SUBJECTIVE / OVERNIGHT EVENTS:  seen and examined  Chart reviewed  No overnight events  No pain  No complaints  confused but at baseline per daughter at bedside,  per daughter had episode of diarrhoea lately. completed macrobid on 11/12, as 7 days treatment for UTI, prescribed by .       ROS:  denied fever/chills/CP/SOB/cough/palpitation/dizziness/abdominal pian/nausea/vomiting/dysuria/leg or calf pain/headaches.all other ROS neg    MEDICATIONS  (STANDING):  atorvastatin 40 milliGRAM(s) Oral at bedtime  enoxaparin Injectable 40 milliGRAM(s) SubCutaneous every 24 hours  gabapentin 200 milliGRAM(s) Oral at bedtime  influenza  Vaccine (HIGH DOSE) 0.5 milliLiter(s) IntraMuscular once  lactated ringers. 1000 milliLiter(s) (100 mL/Hr) IV Continuous <Continuous>  levothyroxine 75 MICROGram(s) Oral daily  mirtazapine 7.5 milliGRAM(s) Oral at bedtime  multivitamin 1 Tablet(s) Oral daily  pantoprazole    Tablet 40 milliGRAM(s) Oral before breakfast  piperacillin/tazobactam IVPB.- 3.375 Gram(s) IV Intermittent once  piperacillin/tazobactam IVPB.. 3.375 Gram(s) IV Intermittent every 8 hours  tamsulosin 0.4 milliGRAM(s) Oral at bedtime    MEDICATIONS  (PRN):  acetaminophen     Tablet .. 650 milliGRAM(s) Oral every 6 hours PRN Temp greater or equal to 38C (100.4F), Mild Pain (1 - 3)  aluminum hydroxide/magnesium hydroxide/simethicone Suspension 30 milliLiter(s) Oral every 4 hours PRN Dyspepsia  melatonin 3 milliGRAM(s) Oral at bedtime PRN Insomnia  ondansetron Injectable 4 milliGRAM(s) IV Push every 8 hours PRN Nausea and/or Vomiting    CAPILLARY BLOOD GLUCOSE        I&O's Summary      PHYSICAL EXAM:  Vital Signs Last 24 Hrs  T(C): 37.8 (14 Nov 2024 13:22), Max: 37.8 (14 Nov 2024 13:22)  T(F): 100 (14 Nov 2024 13:22), Max: 100 (14 Nov 2024 13:22)  HR: 96 (14 Nov 2024 13:22) (76 - 97)  BP: 127/75 (14 Nov 2024 13:22) (97/57 - 127/75)  BP(mean): --  RR: 15 (14 Nov 2024 13:22) (15 - 20)  SpO2: 92% (14 Nov 2024 13:22) (92% - 97%)    Parameters below as of 14 Nov 2024 13:22  Patient On (Oxygen Delivery Method): room air    GENERAL: Not in distress. Alert    HEENT: clear conjuctiva, MMM. no pallor or icterus  CARDIOVASCULAR: RRR S1, S2. soft SM. no rubs/gallop  LUNGS: BLAE+, b/l basal rales, no wheezing, no rhonchi.    ABDOMEN: ND. Soft,  NT, no guarding / rebound / rigidity. BS normoactive  BACK: No spine tenderness.  EXTREMITIES: no edema. no leg or calf TP.  SKIN: warm and dry  PSYCHIATRIC: Calm.  No agitation.  CNS: AAO*2. moves limbs, follows commands. severe hearing def using hearing aids    LABS:                        10.8   12.88 )-----------( 194      ( 14 Nov 2024 05:39 )             32.9     11-14    137  |  103  |  21  ----------------------------<  101[H]  3.7   |  27  |  0.90    Ca    8.7      14 Nov 2024 05:39    TPro  5.7[L]  /  Alb  2.0[L]  /  TBili  0.9  /  DBili  x   /  AST  25  /  ALT  22  /  AlkPhos  114  11-14          Urinalysis Basic - ( 14 Nov 2024 05:39 )    Color: x / Appearance: x / SG: x / pH: x  Gluc: 101 mg/dL / Ketone: x  / Bili: x / Urobili: x   Blood: x / Protein: x / Nitrite: x   Leuk Esterase: x / RBC: x / WBC x   Sq Epi: x / Non Sq Epi: x / Bacteria: x            RADIOLOGY & ADDITIONAL TESTS:  Imaging from Last 24 Hours:    Electrocardiogram/QTc Interval:    COORDINATION OF CARE:  Care Discussed with Consultants/Other Providers:  
Medicine Progress Note    Patient is a 89y old  Female who presents with a chief complaint of Weakness and confusion in a 89-year-old female. (15 Nov 2024 13:45)      SUBJECTIVE / OVERNIGHT EVENTS:  seen and examined  Chart reviewed  No overnight events  agitated in am. preoccupied with thoughts of going to restroom even though she just went and had BM with solid formed stool. controlled with one dose of zyprexa. daughter at bedside. Requesting to DC home if possible as it is normal for patient gets agitated while in hospital.   unable to obtain history as patient was not answering qs. rather asking to take her to restroom.     MEDICATIONS  (STANDING):  atorvastatin 40 milliGRAM(s) Oral at bedtime  enoxaparin Injectable 40 milliGRAM(s) SubCutaneous every 24 hours  gabapentin 200 milliGRAM(s) Oral at bedtime  influenza  Vaccine (HIGH DOSE) 0.5 milliLiter(s) IntraMuscular once  levothyroxine 75 MICROGram(s) Oral daily  mirtazapine 7.5 milliGRAM(s) Oral at bedtime  multivitamin 1 Tablet(s) Oral daily  pantoprazole    Tablet 40 milliGRAM(s) Oral before breakfast  piperacillin/tazobactam IVPB.. 3.375 Gram(s) IV Intermittent every 8 hours  tamsulosin 0.4 milliGRAM(s) Oral at bedtime    MEDICATIONS  (PRN):  acetaminophen     Tablet .. 650 milliGRAM(s) Oral every 6 hours PRN Temp greater or equal to 38C (100.4F), Mild Pain (1 - 3)  albuterol/ipratropium for Nebulization 3 milliLiter(s) Nebulizer every 6 hours PRN Bronchospasm  aluminum hydroxide/magnesium hydroxide/simethicone Suspension 30 milliLiter(s) Oral every 4 hours PRN Dyspepsia  melatonin 3 milliGRAM(s) Oral at bedtime PRN Insomnia  ondansetron Injectable 4 milliGRAM(s) IV Push every 8 hours PRN Nausea and/or Vomiting    CAPILLARY BLOOD GLUCOSE        I&O's Summary    14 Nov 2024 07:01  -  15 Nov 2024 07:00  --------------------------------------------------------  IN: 0 mL / OUT: 1200 mL / NET: -1200 mL        PHYSICAL EXAM:  Vital Signs Last 24 Hrs  T(C): 36.7 (15 Nov 2024 05:56), Max: 37.2 (14 Nov 2024 17:50)  T(F): 98 (15 Nov 2024 05:56), Max: 98.9 (14 Nov 2024 17:50)  HR: 86 (15 Nov 2024 05:56) (86 - 95)  BP: 169/85 (15 Nov 2024 05:56) (143/84 - 169/85)  BP(mean): --  RR: 18 (15 Nov 2024 05:56) (14 - 18)  SpO2: 96% (15 Nov 2024 05:56) (92% - 96%)    Parameters below as of 15 Nov 2024 05:56  Patient On (Oxygen Delivery Method): room air    GENERAL: Not in distress. Alert  in am. sleepy after taking Zyprexa whoever watching consevration  HEENT: clear conjuctiva, MMM. no pallor or icterus  CARDIOVASCULAR: RRR S1, S2. soft SM. no rubs/gallop  LUNGS: BLAE+, b/l basal rales, no wheezing, no rhonchi.    ABDOMEN: ND. Soft,  NT, no guarding / rebound / rigidity. BS normoactive  BACK: No spine tenderness.  EXTREMITIES: no edema. no leg or calf TP.  SKIN: warm and dry  PSYCHIATRIC: Calm.  No agitation.  CNS: AAO*2. moves limbs, not following commands. severe hearing def using hearing aids    LABS:                        11.7   8.24  )-----------( 231      ( 15 Nov 2024 06:19 )             35.2     11-15    138  |  102  |  15  ----------------------------<  102[H]  3.2[L]   |  27  |  0.86    Ca    9.2      15 Nov 2024 06:19  Mg     1.7     11-15    TPro  6.6  /  Alb  2.4[L]  /  TBili  0.8  /  DBili  x   /  AST  29  /  ALT  23  /  AlkPhos  125[H]  11-15          Urinalysis Basic - ( 15 Nov 2024 06:19 )    Color: x / Appearance: x / SG: x / pH: x  Gluc: 102 mg/dL / Ketone: x  / Bili: x / Urobili: x   Blood: x / Protein: x / Nitrite: x   Leuk Esterase: x / RBC: x / WBC x   Sq Epi: x / Non Sq Epi: x / Bacteria: x        Culture - Urine (collected 13 Nov 2024 16:15)  Source: Clean Catch Clean Catch (Midstream)  Final Report (15 Nov 2024 08:50):    <10,000 CFU/mL Normal Urogenital Bri    Culture - Blood (collected 13 Nov 2024 13:15)  Source: .Blood BLOOD  Preliminary Report (14 Nov 2024 19:01):    No growth at 24 hours    Culture - Blood (collected 13 Nov 2024 13:15)  Source: .Blood BLOOD  Preliminary Report (14 Nov 2024 19:01):    No growth at 24 hours    < from: CT Chest No Cont (11.14.24 @ 17:14) >    IMPRESSION:  *  Bilateral pulmonary fibrotic changes, increased compared to prior   examination.  *  Bibasilar consolidation consistent with atelectasis and/or pneumonia.  *  Bilateral upper lobe groundglass opacities, which may be   infectious/inflammatory etiology. Follow-up imaging is recommended to   assess for resolution.  *  Trace bilateral pleural effusions.  *  Mediastinal adenopathy.  *  Diverticulosis, without evidence of acute diverticulitis.      < end of copied text >        RADIOLOGY & ADDITIONAL TESTS:  Imaging from Last 24 Hours:    Electrocardiogram/QTc Interval:    COORDINATION OF CARE:  Care Discussed with Consultants/Other Providers:

## 2024-11-15 NOTE — DISCHARGE NOTE PROVIDER - NSDCCPCAREPLAN_GEN_ALL_CORE_FT
PRINCIPAL DISCHARGE DIAGNOSIS  Diagnosis: Severe sepsis  Assessment and Plan of Treatment: You came to the hospital due to fever, weakness, confusion  You were diagnosed with sepsis, pneumonia, pulmonary fibrosis, hypokalemia. Chronic congestive heart failure. your heart function called EF was found to be 40-45% in echocardiogram yesterday. last echocardiogram was done on 2023 was 50-55%.  You were treated with zosyn, IV fluid,   You were prescribed the following new medications: Augmentin [ antibiotics for pneumonia], Farxiga [ for congestive heart failure], Proair HFA inhaller [ use as needed for shortness of breath, wheezing  You will need to follow up with your primary care physician for further management. Also you need to see infections disease doctor, cardiologist and pulmonary doctor outpatient. you already got appointment with Dr. Foote on 11/18 and Dr. Polo on Wednesday 11/19. see infectious disease doctor in 1 week. call to schedule appointment with lung doctor. discuss with your primary MD. may need to repeat CT chest in 4-6 weeks to reassess. your potassium needs to be rechecked in 3-5 days. Come to ER if fever, shortness or breath, leg swelling, chest pain, recurrent or persistent palpitation. dizziness or any new concerning symptoms. we arranged home care nursing visit and home physical therapy. fall and aspiration precautions advised.   Discharging Provider: Kylee Resendiz MD  Contact Info: [phone] Please call with any questions or concerns.      SECONDARY DISCHARGE DIAGNOSES  Diagnosis: Fever  Assessment and Plan of Treatment: as above

## 2024-11-15 NOTE — DISCHARGE NOTE PROVIDER - CARE PROVIDERS DIRECT ADDRESSES
[email or web address]

## 2024-11-15 NOTE — DISCHARGE NOTE PROVIDER - DETAILS OF MALNUTRITION DIAGNOSIS/DIAGNOSES
This patient has been assessed with a concern for Malnutrition and was treated during this hospitalization for the following Nutrition diagnosis/diagnoses:     -  11/14/2024: Severe protein-calorie malnutrition

## 2024-11-15 NOTE — DISCHARGE NOTE NURSING/CASE MANAGEMENT/SOCIAL WORK - NSSCCONTNUM_GEN_ALL_CORE
Initial Clinical Review    Admission: Date/Time/Statement: 2/5/18 @ 1740    Orders Placed This Encounter   Procedures    Inpatient Admission (expected length of stay for this patient is greater than two midnights)     Standing Status:   Standing     Number of Occurrences:   1     Order Specific Question:   Admitting Physician     Answer:   Caroline Black     Order Specific Question:   Level of Care     Answer:   Med Surg [16]     Order Specific Question:   Estimated length of stay     Answer:   More than 2 Midnights     Order Specific Question:   Certification     Answer:   I certify that inpatient services are medically necessary for this patient for a duration of greater than two midnights  See H&P and MD Progress Notes for additional information about the patient's course of treatment  ED: Date/Time/Mode of Arrival:   ED Arrival Information     Expected Arrival Acuity Means of Arrival Escorted By Service Admission Type    - 2/5/2018 10:18 Urgent Ambulance [facility] [address] [facility] EMS General Medicine Urgent    Arrival Complaint    fall          Chief Complaint:   Chief Complaint   Patient presents with    Hip Pain     patient fell yesterday  Patient complaining of left hip pain  Difficulty ambulating today  History of Illness: 80 y  o female ambulator with walker, history of Parkinson's disease, complaining of left hip pain status post mechanical fall  Per her son, she was not using her walker when she tripped and fell onto her left side  Her fall reportedly happened yesterday afternoon, she was initially able to weightbear on the left leg however her pain became increasingly difficult for her to tolerate    She denies any other injuries or sites of tenderness         ED Vital Signs:   ED Triage Vitals   Temperature Pulse Respirations Blood Pressure SpO2   02/05/18 1029 02/05/18 1029 02/05/18 1029 02/05/18 1029 02/05/18 1029   98 4 °F (36 9 °C) 58 15 95/54 98 %      Temp Source Heart Rate Source Patient Position - Orthostatic VS BP Location FiO2 (%)   18 1029 18 1155 18 1029 18 1029 --   Oral Monitor Sitting Left arm       Pain Score       18 1029       5        Wt Readings from Last 1 Encounters:   18 54 9 kg (121 lb)       Vital Signs (abnormal):   18 1900  98 7 °F (37 1 °C)   52  18  122/57  97 %  None (Room air)  Lying   18 1741  --   49  16  126/59  96 %  None (Room air)  Lying   18 1633  --   51  16   182/71  96 %  None (Room air)  Lying   18 1515  --   54  16   178/74           Abnormal Labs: Albumin 3 5 - 5 0 g/dL 3 2       Diagnostic Test Results: CT Pelvis - Fracture greater trochanter left proximal femur  Small volume of simple pelvic free fluid  Large volume of stool in the rectum  ED Treatment:   Medication Administration from 2018 1018 to 2018 1923       Date/Time Order Dose Route Action     2018 1300 sodium chloride 0 9 % bolus 1,000 mL 1,000 mL Intravenous New Bag     2018 1429 acetaminophen (TYLENOL) tablet 650 mg 650 mg Oral Given          Past Medical/Surgical History:    Active Ambulatory Problems     Diagnosis Date Noted    Parkinson disease ([facility] [address] ) 2016    Hypoglycemia 2016    Femur fracture, right ([facility] [address] ) 2017    DDD (degenerative disc disease), lumbar 2017    Depression with anxiety 2017    Anemia associated with acute blood loss 2017     Resolved Ambulatory Problems     Diagnosis Date Noted    Lightheadedness 2016    Blurry vision 2016    HLD (hyperlipidemia) 2016    Aortic stenosis 2016    Acute encephalopathy 2016     Past Medical History:   Diagnosis Date    Anxiety     Dementia     Depression     Gastric ulcer     H/O:  section     HLD (hyperlipidemia) 2016    Hypercholesteremia     Parkinson's disease ([facility] [address] [facility] )     Raynaud's disease        Admitting Diagnosis: Parkinson disease ([facility] [address] ) [G20]  Hypoglycemia [E16 2]  Anemia associated with acute blood loss [D62]  Closed fracture of greater trochanter of left femur ([facility] [address] ) [S72 112A]  Unspecified multiple injuries, initial encounter [T07  XXXA]    Age/Sex: 80 y o  female    Assessment:  80 y  o female with left hip greater trochanteric fracture     Plan:   · WBAT left lower extremity  · PT  · Pain control  · If required, can admit to Medicine for pain control and ambulatory dysfunction  · Encourage ambulation, may require DVT prophylaxis if bed bound for extensive period, mechanical DVT prophylaxis currently          Admission Orders:  Internal Medicine cons  Orthopedic Surgery cons  PT/OT eval and treat    Scheduled Meds:   Current Facility-Administered Medications:  entacapone 200 mg Oral 5x Daily   And      carbidopa-levodopa 2 tablet Oral 5x Daily   docusate sodium 100 mg Oral BID   enoxaparin 40 mg Subcutaneous Daily   lactated ringers 75 mL/hr Intravenous Continuous   mirtazapine 15 mg Oral HS   senna 1 tablet Oral Daily   tolterodine 2 mg Oral Daily     Continuous Infusions:   lactated ringers 75 mL/hr Last Rate: 75 mL/hr (02/06/18 0944)     PRN Meds:     acetaminophen    morphine injection    ondansetron    oxyCODONE po x1    --------------------------------------------------------------------------------------------------  2/6 Physician progress notes:  Assessment:  80 y  o female with left hip greater trochanteric fracture     Plan:   · WBAT left lower extremity  · Non op mgmt of fracture  · PT  · Pain control  · Encourage ambulation, may require DVT prophylaxis if bed bound for extensive period, mechanical DVT prophylaxis currently  · Ortho signing off [phone]

## 2024-11-15 NOTE — PROGRESS NOTE ADULT - REASON FOR ADMISSION
Weakness and confusion in a 89-year-old female.

## 2024-11-15 NOTE — DISCHARGE NOTE PROVIDER - NSDCFUSCHEDAPPT_GEN_ALL_CORE_FT
[facility]  [address]  Scheduled Appointment: 11/20/2024     Mark Foote  [facility]  CARDIOLOGY [address]  Scheduled Appointment: 11/18/2024    [facility]  [facility]  FAMILYMED [address] [facility] [place]  Scheduled Appointment: 11/20/2024

## 2024-11-15 NOTE — PROGRESS NOTE ADULT - NUTRITIONAL ASSESSMENT
This patient has been assessed with a concern for Malnutrition and has been determined to have a diagnosis/diagnoses of Severe protein-calorie malnutrition.    The following pending diet order is being considered for treatment of Severe protein-calorie malnutrition:  Diet Regular-  Supplement Feeding Modality:  Oral  Ensure Plus High Protein Cans or Servings Per Day:  1       Frequency:  Two Times a day  Entered: Nov 14 2024  8:34AM  
This patient has been assessed with a concern for Malnutrition and has been determined to have a diagnosis/diagnoses of Severe protein-calorie malnutrition.    This patient is being managed with:   Diet Regular-  Entered: Nov 13 2024  5:47PM    The following pending diet order is being considered for treatment of Severe protein-calorie malnutrition:  Diet Regular-  Supplement Feeding Modality:  Oral  Ensure Plus High Protein Cans or Servings Per Day:  1       Frequency:  Two Times a day  Entered: Nov 14 2024  8:34AM

## 2024-11-16 LAB
CULTURE RESULTS: SIGNIFICANT CHANGE UP
SPECIMEN SOURCE: SIGNIFICANT CHANGE UP

## 2024-11-18 ENCOUNTER — APPOINTMENT (OUTPATIENT)
Dept: CARDIOLOGY | Facility: CLINIC | Age: 89
End: 2024-11-18
Payer: MEDICARE

## 2024-11-18 ENCOUNTER — NON-APPOINTMENT (OUTPATIENT)
Age: 89
End: 2024-11-18

## 2024-11-18 VITALS
OXYGEN SATURATION: 97 % | RESPIRATION RATE: 17 BRPM | WEIGHT: 130 LBS | HEIGHT: 62 IN | DIASTOLIC BLOOD PRESSURE: 75 MMHG | HEART RATE: 88 BPM | SYSTOLIC BLOOD PRESSURE: 119 MMHG | BODY MASS INDEX: 23.92 KG/M2

## 2024-11-18 DIAGNOSIS — Z87.891 PERSONAL HISTORY OF NICOTINE DEPENDENCE: ICD-10-CM

## 2024-11-18 DIAGNOSIS — I51.9 HEART DISEASE, UNSPECIFIED: ICD-10-CM

## 2024-11-18 DIAGNOSIS — I25.10 ATHEROSCLEROTIC HEART DISEASE OF NATIVE CORONARY ARTERY W/OUT ANGINA PECTORIS: ICD-10-CM

## 2024-11-18 DIAGNOSIS — I44.7 LEFT BUNDLE-BRANCH BLOCK, UNSPECIFIED: ICD-10-CM

## 2024-11-18 PROCEDURE — 99214 OFFICE O/P EST MOD 30 MIN: CPT

## 2024-11-18 PROCEDURE — G2211 COMPLEX E/M VISIT ADD ON: CPT

## 2024-11-18 PROCEDURE — 93000 ELECTROCARDIOGRAM COMPLETE: CPT

## 2024-11-18 RX ORDER — DAPAGLIFLOZIN 10 MG/1
10 TABLET, FILM COATED ORAL DAILY
Qty: 30 | Refills: 2 | Status: ACTIVE | COMMUNITY

## 2024-11-20 ENCOUNTER — APPOINTMENT (OUTPATIENT)
Dept: FAMILY MEDICINE | Facility: CLINIC | Age: 89
End: 2024-11-20
Payer: MEDICARE

## 2024-11-20 VITALS
OXYGEN SATURATION: 97 % | HEIGHT: 62 IN | WEIGHT: 130 LBS | DIASTOLIC BLOOD PRESSURE: 82 MMHG | HEART RATE: 86 BPM | BODY MASS INDEX: 23.92 KG/M2 | TEMPERATURE: 98 F | SYSTOLIC BLOOD PRESSURE: 139 MMHG | RESPIRATION RATE: 17 BRPM

## 2024-11-20 DIAGNOSIS — K21.9 GASTRO-ESOPHAGEAL REFLUX DISEASE W/OUT ESOPHAGITIS: ICD-10-CM

## 2024-11-20 DIAGNOSIS — I10 ESSENTIAL (PRIMARY) HYPERTENSION: ICD-10-CM

## 2024-11-20 DIAGNOSIS — R26.81 UNSTEADINESS ON FEET: ICD-10-CM

## 2024-11-20 DIAGNOSIS — R91.8 OTHER NONSPECIFIC ABNORMAL FINDING OF LUNG FIELD: ICD-10-CM

## 2024-11-20 DIAGNOSIS — Z86.19 PERSONAL HISTORY OF OTHER INFECTIOUS AND PARASITIC DISEASES: ICD-10-CM

## 2024-11-20 DIAGNOSIS — Z09 ENCOUNTER FOR FOLLOW-UP EXAMINATION AFTER COMPLETED TREATMENT FOR CONDITIONS OTHER THAN MALIGNANT NEOPLASM: ICD-10-CM

## 2024-11-20 DIAGNOSIS — R68.89 OTHER GENERAL SYMPTOMS AND SIGNS: ICD-10-CM

## 2024-11-20 PROCEDURE — 99214 OFFICE O/P EST MOD 30 MIN: CPT

## 2024-11-26 PROCEDURE — 82607 VITAMIN B-12: CPT

## 2024-11-26 PROCEDURE — 83880 ASSAY OF NATRIURETIC PEPTIDE: CPT

## 2024-11-26 PROCEDURE — 83735 ASSAY OF MAGNESIUM: CPT

## 2024-11-26 PROCEDURE — 84443 ASSAY THYROID STIM HORMONE: CPT

## 2024-11-26 PROCEDURE — 96365 THER/PROPH/DIAG IV INF INIT: CPT

## 2024-11-26 PROCEDURE — 97162 PT EVAL MOD COMPLEX 30 MIN: CPT

## 2024-11-26 PROCEDURE — 84484 ASSAY OF TROPONIN QUANT: CPT

## 2024-11-26 PROCEDURE — 71250 CT THORAX DX C-: CPT | Mod: MC

## 2024-11-26 PROCEDURE — 96367 TX/PROPH/DG ADDL SEQ IV INF: CPT

## 2024-11-26 PROCEDURE — 87637 SARSCOV2&INF A&B&RSV AMP PRB: CPT

## 2024-11-26 PROCEDURE — 87177 OVA AND PARASITES SMEARS: CPT

## 2024-11-26 PROCEDURE — 71045 X-RAY EXAM CHEST 1 VIEW: CPT

## 2024-11-26 PROCEDURE — 82962 GLUCOSE BLOOD TEST: CPT

## 2024-11-26 PROCEDURE — 82746 ASSAY OF FOLIC ACID SERUM: CPT

## 2024-11-26 PROCEDURE — 93306 TTE W/DOPPLER COMPLETE: CPT

## 2024-11-26 PROCEDURE — 87040 BLOOD CULTURE FOR BACTERIA: CPT

## 2024-11-26 PROCEDURE — 80053 COMPREHEN METABOLIC PANEL: CPT

## 2024-11-26 PROCEDURE — 84145 PROCALCITONIN (PCT): CPT

## 2024-11-26 PROCEDURE — 81001 URINALYSIS AUTO W/SCOPE: CPT

## 2024-11-26 PROCEDURE — 87086 URINE CULTURE/COLONY COUNT: CPT

## 2024-11-26 PROCEDURE — 93005 ELECTROCARDIOGRAM TRACING: CPT

## 2024-11-26 PROCEDURE — 85025 COMPLETE CBC W/AUTO DIFF WBC: CPT

## 2024-11-26 PROCEDURE — 74176 CT ABD & PELVIS W/O CONTRAST: CPT | Mod: MC

## 2024-11-26 PROCEDURE — 99291 CRITICAL CARE FIRST HOUR: CPT | Mod: 25

## 2024-11-26 PROCEDURE — 36415 COLL VENOUS BLD VENIPUNCTURE: CPT

## 2024-11-26 PROCEDURE — 83605 ASSAY OF LACTIC ACID: CPT

## 2024-12-19 ENCOUNTER — RX RENEWAL (OUTPATIENT)
Age: 88
End: 2024-12-19

## 2024-12-25 ENCOUNTER — NON-APPOINTMENT (OUTPATIENT)
Age: 88
End: 2024-12-25

## 2024-12-27 ENCOUNTER — OUTPATIENT (OUTPATIENT)
Dept: OUTPATIENT SERVICES | Facility: HOSPITAL | Age: 88
LOS: 1 days | End: 2024-12-27
Payer: MEDICARE

## 2024-12-27 ENCOUNTER — APPOINTMENT (OUTPATIENT)
Dept: RADIOLOGY | Facility: HOSPITAL | Age: 88
End: 2024-12-27
Payer: MEDICARE

## 2024-12-27 DIAGNOSIS — Z90.710 ACQUIRED ABSENCE OF BOTH CERVIX AND UTERUS: Chronic | ICD-10-CM

## 2024-12-27 DIAGNOSIS — Z98.49 CATARACT EXTRACTION STATUS, UNSPECIFIED EYE: Chronic | ICD-10-CM

## 2024-12-27 DIAGNOSIS — R91.8 OTHER NONSPECIFIC ABNORMAL FINDING OF LUNG FIELD: ICD-10-CM

## 2024-12-27 DIAGNOSIS — Z98.89 OTHER SPECIFIED POSTPROCEDURAL STATES: Chronic | ICD-10-CM

## 2024-12-27 DIAGNOSIS — S72.009A FRACTURE OF UNSPECIFIED PART OF NECK OF UNSPECIFIED FEMUR, INITIAL ENCOUNTER FOR CLOSED FRACTURE: Chronic | ICD-10-CM

## 2024-12-27 PROCEDURE — 71046 X-RAY EXAM CHEST 2 VIEWS: CPT | Mod: 26

## 2024-12-27 PROCEDURE — 71046 X-RAY EXAM CHEST 2 VIEWS: CPT

## 2025-01-28 NOTE — H&P ADULT - NSHPPHYSICALEXAM_GEN_ALL_CORE
HL - please refill if appropriate. Last CR 1.50 7/3/24. Thank you!   .  VITAL SIGNS:  T(C): 36.4 (08-24-19 @ 15:00), Max: 36.8 (08-24-19 @ 11:29)  T(F): 97.5 (08-24-19 @ 15:00), Max: 98.2 (08-24-19 @ 11:29)  HR: 91 (08-24-19 @ 13:30) (91 - 106)  BP: 174/89 (08-24-19 @ 13:30) (135/82 - 174/89)  BP(mean): 108 (08-24-19 @ 13:15) (108 - 108)  RR: 18 (08-24-19 @ 13:30) (17 - 19)  SpO2: 99% (08-24-19 @ 13:30) (97% - 99%)  Wt(kg): --    PHYSICAL EXAM:    Constitutional: elderly female, WDWN resting comfortably in bed; NAD  Head: NC/AT  Eyes: PERRLA, EOMI, clear conjunctiva  ENT: no nasal discharge; no oropharyngeal erythema or exudates; dry oral mucosa, denture in place  Neck: supple; no JVD or thyromegaly  Respiratory: CTA B/L; no W/R/R, no retractions  Cardiac: tachycardic, regular rhythm, normal +S1/S2; no M/R/G; PMI non-displaced  Gastrointestinal: soft, NT/ND; no rebound or guarding; +BS, no hepatosplenomegaly  Extremities: WWP, no clubbing or cyanosis; no peripheral edema  Vascular: 2+ radial, DP/PT pulses B/L  Lymphatic: no submandibular or cervical LAD  Neurologic: AAOx2 (oriented to person and place); answers questions appropriately, follows commands, moves all extremities, CNII-XII grossly intact; motor 5/5 in RUE and RLE, 5/5 in LUE, LLE: 3/5 hip flexion, 5/5 knee flexion and extension, 5/5 dorsiflexion and plantar flexion, sensation intact to light touch throughout, no DDK or dysmetria .  VITAL SIGNS:  T(C): 36.4 (08-24-19 @ 15:00), Max: 36.8 (08-24-19 @ 11:29)  T(F): 97.5 (08-24-19 @ 15:00), Max: 98.2 (08-24-19 @ 11:29)  HR: 91 (08-24-19 @ 13:30) (91 - 106)  BP: 174/89 (08-24-19 @ 13:30) (135/82 - 174/89)  BP(mean): 108 (08-24-19 @ 13:15) (108 - 108)  RR: 18 (08-24-19 @ 13:30) (17 - 19)  SpO2: 99% (08-24-19 @ 13:30) (97% - 99%)  Wt(kg): --    PHYSICAL EXAM:    Constitutional: elderly female, WDWN resting comfortably in bed; NAD  Head: NC/AT  Eyes: PERRLA, EOMI, clear conjunctiva  ENT: no nasal discharge; no oropharyngeal erythema or exudates; dry oral mucosa, denture in place  Neck: supple; no JVD or thyromegaly  Respiratory: CTA B/L; no W/R/R, no retractions  Cardiac: tachycardic, regular rhythm, normal +S1/S2; no M/R/G; PMI non-displaced  Gastrointestinal: soft, NT/ND; no rebound or guarding; +BS, no hepatosplenomegaly  Extremities: WWP, no clubbing or cyanosis; no peripheral edema  Back: bruise on left lumbar (L3-L5) region, mildly tender to palpation, no step-offs, no point tenderness of vertebral column  Vascular: 2+ radial, DP/PT pulses B/L  Lymphatic: no submandibular or cervical LAD  Neurologic: AAOx2 (oriented to person and place); answers questions appropriately, follows commands, moves all extremities, CNII-XII grossly intact; motor 5/5 in RUE and RLE, 5/5 in LUE, LLE: 3/5 hip flexion, 5/5 knee flexion and extension, 5/5 dorsiflexion and plantar flexion, sensation intact to light touch throughout, no DDK or dysmetria

## 2025-03-31 DIAGNOSIS — R35.0 FREQUENCY OF MICTURITION: ICD-10-CM

## 2025-04-04 ENCOUNTER — RX RENEWAL (OUTPATIENT)
Age: 89
End: 2025-04-04

## 2025-04-20 NOTE — ED PROVIDER NOTE - NSICDXPASTMEDICALHX_GEN_ALL_CORE_FT
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     
Initial (On Arrival)

## 2025-04-21 ENCOUNTER — APPOINTMENT (OUTPATIENT)
Dept: CARDIOLOGY | Facility: CLINIC | Age: 89
End: 2025-04-21

## 2025-04-25 ENCOUNTER — EMERGENCY (EMERGENCY)
Facility: HOSPITAL | Age: 89
LOS: 1 days | End: 2025-04-25
Attending: EMERGENCY MEDICINE | Admitting: EMERGENCY MEDICINE
Payer: MEDICARE

## 2025-04-25 VITALS
DIASTOLIC BLOOD PRESSURE: 76 MMHG | RESPIRATION RATE: 16 BRPM | OXYGEN SATURATION: 100 % | SYSTOLIC BLOOD PRESSURE: 145 MMHG | TEMPERATURE: 98 F | HEART RATE: 76 BPM

## 2025-04-25 VITALS
HEIGHT: 63 IN | WEIGHT: 130.07 LBS | HEART RATE: 82 BPM | DIASTOLIC BLOOD PRESSURE: 83 MMHG | SYSTOLIC BLOOD PRESSURE: 151 MMHG | TEMPERATURE: 98 F | OXYGEN SATURATION: 98 % | RESPIRATION RATE: 18 BRPM

## 2025-04-25 DIAGNOSIS — Z98.89 OTHER SPECIFIED POSTPROCEDURAL STATES: Chronic | ICD-10-CM

## 2025-04-25 DIAGNOSIS — Z90.710 ACQUIRED ABSENCE OF BOTH CERVIX AND UTERUS: Chronic | ICD-10-CM

## 2025-04-25 DIAGNOSIS — S72.009A FRACTURE OF UNSPECIFIED PART OF NECK OF UNSPECIFIED FEMUR, INITIAL ENCOUNTER FOR CLOSED FRACTURE: Chronic | ICD-10-CM

## 2025-04-25 DIAGNOSIS — Z98.49 CATARACT EXTRACTION STATUS, UNSPECIFIED EYE: Chronic | ICD-10-CM

## 2025-04-25 PROCEDURE — 90715 TDAP VACCINE 7 YRS/> IM: CPT

## 2025-04-25 PROCEDURE — 12001 RPR S/N/AX/GEN/TRNK 2.5CM/<: CPT

## 2025-04-25 PROCEDURE — 70450 CT HEAD/BRAIN W/O DYE: CPT | Mod: MC

## 2025-04-25 PROCEDURE — 90471 IMMUNIZATION ADMIN: CPT

## 2025-04-25 PROCEDURE — 99284 EMERGENCY DEPT VISIT MOD MDM: CPT | Mod: 25

## 2025-04-25 PROCEDURE — 70450 CT HEAD/BRAIN W/O DYE: CPT | Mod: 26

## 2025-04-25 RX ORDER — CLOSTRIDIUM TETANI TOXOID ANTIGEN (FORMALDEHYDE INACTIVATED), CORYNEBACTERIUM DIPHTHERIAE TOXOID ANTIGEN (FORMALDEHYDE INACTIVATED), BORDETELLA PERTUSSIS TOXOID ANTIGEN (GLUTARALDEHYDE INACTIVATED), BORDETELLA PERTUSSIS FILAMENTOUS HEMAGGLUTININ ANTIGEN (FORMALDEHYDE INACTIVATED), BORDETELLA PERTUSSIS PERTACTIN ANTIGEN, AND BORDETELLA PERTUSSIS FIMBRIAE 2/3 ANTIGEN 5; 2; 2.5; 5; 3; 5 [LF]/.5ML; [LF]/.5ML; UG/.5ML; UG/.5ML; UG/.5ML; UG/.5ML
0.5 INJECTION, SUSPENSION INTRAMUSCULAR ONCE
Refills: 0 | Status: COMPLETED | OUTPATIENT
Start: 2025-04-25 | End: 2025-04-25

## 2025-04-25 RX ADMIN — CLOSTRIDIUM TETANI TOXOID ANTIGEN (FORMALDEHYDE INACTIVATED), CORYNEBACTERIUM DIPHTHERIAE TOXOID ANTIGEN (FORMALDEHYDE INACTIVATED), BORDETELLA PERTUSSIS TOXOID ANTIGEN (GLUTARALDEHYDE INACTIVATED), BORDETELLA PERTUSSIS FILAMENTOUS HEMAGGLUTININ ANTIGEN (FORMALDEHYDE INACTIVATED), BORDETELLA PERTUSSIS PERTACTIN ANTIGEN, AND BORDETELLA PERTUSSIS FIMBRIAE 2/3 ANTIGEN 0.5 MILLILITER(S): 5; 2; 2.5; 5; 3; 5 INJECTION, SUSPENSION INTRAMUSCULAR at 02:43

## 2025-04-25 NOTE — ED ADULT NURSE NOTE - OBJECTIVE STATEMENT
Pt presents to the ED s/p fall in the bathroom at home. Pts aide witnessed the fall and states she fell backwards hitting her head. Neg LOC or use of anticoagulants. Pt has a history of dementia. A&OX2. Pt denies chest pain, dizziness, nausea, or vomiting.

## 2025-04-25 NOTE — ED PROVIDER NOTE - PHYSICAL EXAMINATION
CONSTITUTIONAL: NAD  SKIN: Warm dry  HEAD: 2 cm laceration noted on the occipital portion of scalp.   EYES: NL inspection  ENT: MMM  CARD: RRR  RESP: Unlabored respirations  NEURO: Grossly unremarkable  PSYCH: Cooperative, appropriate.

## 2025-04-25 NOTE — ED ADULT TRIAGE NOTE - CHIEF COMPLAINT QUOTE
Pt BIBA from home s/p fall in the bathroom. Pts aide witnessed the fall and states she fell backwards hitting her head. Neg LOC or use of anticoagulants.

## 2025-04-25 NOTE — ED ADULT NURSE NOTE - NSFALLHARMRISKINTERV_ED_ALL_ED
cigarettes/quit 2007
Assistance OOB with selected safe patient handling equipment if applicable/Communicate risk of Fall with Harm to all staff, patient, and family/Monitor gait and stability/Provide patient with walking aids/Provide visual cue: red socks, yellow wristband, yellow gown, etc/Reinforce activity limits and safety measures with patient and family/Bed in lowest position, wheels locked, appropriate side rails in place/Call bell, personal items and telephone in reach/Instruct patient to call for assistance before getting out of bed/chair/stretcher/Non-slip footwear applied when patient is off stretcher/[place] to call system/Physically safe environment - no spills, clutter or unnecessary equipment/Purposeful Proactive Rounding/Room/bathroom lighting operational, light cord in reach

## 2025-04-25 NOTE — ED PROVIDER NOTE - NSFOLLOWUPINSTRUCTIONS_ED_ALL_ED_FT
please follow up with your doctor in 2-3 days.   staples should be removed in 10-14 days.   keep wound clean and dry.   return to ED for any concerns

## 2025-04-25 NOTE — ED PROVIDER NOTE - CLINICAL SUMMARY MEDICAL DECISION MAKING FREE TEXT BOX
Pt is a 89 y/o F w PMHx of hypothyroidism c/o fall. Pt's daughter states that the pt was going to the bathroom and slipped and fell on the back of her head. Pt's daughter reports the patient has a cut on the back of her head. Pt's daughter denies any LOC, vision changes, n/v, changes in mental state, blood thinners, neck pain.  PE: 2 cm laceration on the occipital area of scalp     Staples, tetanus shot, CT scan of head Pt is a 89 y/o F w PMHx of hypothyroidism c/o fall. Pt's daughter states that the pt was going to the bathroom and slipped and fell on the back of her head. Pt's daughter reports the patient has a cut on the back of her head. Pt's daughter denies any LOC, vision changes, n/v, changes in mental state, blood thinners, neck pain.  PE: 2 cm laceration on the occipital area of scalp     Staples, tetanus shot, CT scan of head    reeval at 0300:   pt and family told of results.   2 staples place to scalp.   ADA given.  family agree with plan for d/c home and will take for f/u

## 2025-04-25 NOTE — ED PROVIDER NOTE - OBJECTIVE STATEMENT
Pt is a 89 y/o F w PMHx of hypothyroidism c/o fall. Pt's daughter states that the pt was going to the bathroom and slipped and fell on the back of her head. Pt was getting up from using the toilet and slipped. Pt's daughter reports the patient has a cut on the back of her head. Pt's daughter denies any LOC, vision changes, n/v, changes in mental state, blood thinners.

## 2025-04-25 NOTE — ED PROVIDER NOTE - PATIENT PORTAL LINK FT
You can access the FollowMyHealth Patient Portal offered by [facility] by registering at the following website: [email or web address]. By joining [employer]’s FollowMyHealth portal, you will also be able to view your health information using other applications (apps) compatible with our system.

## 2025-04-25 NOTE — ED PROVIDER NOTE - NS ED ATTENDING STATEMENT MOD
I have seen and examined this patient and fully participated in the care of this patient as the teaching attending.  The service was shared with the JESSENIA.  I reviewed and verified the documentation.

## 2025-04-25 NOTE — ED PROVIDER NOTE - ATTENDING CONTRIBUTION TO CARE
I, Rodriguez Garcia, performed the initial face to face bedside interview with this patient regarding history of present illness, review of symptoms and relevant past medical, social and family history.  I completed an independent physical examination.  I was the initial provider who evaluated this patient. I personally evaluated pt with PA student.    case d/w PA student at length and agree with A/P for CT, staples, ADA and reeval

## 2025-05-01 NOTE — CHART NOTE - NSCHARTNOTEFT_GEN_A_CORE
89 y o female presented to the ED for fall as per chart.  SW called to discuss patient safety at home and assist with scheduling recommended primary care follow up appointments and spoke with daughter.  Daughter stated that she followed up with Dr. Bing Polo within one week of ED visit on removing staples/suture.  Staples to be removed in 10 - 14 days by Dr. Polo.

## 2025-05-06 ENCOUNTER — APPOINTMENT (OUTPATIENT)
Dept: FAMILY MEDICINE | Facility: HOME HEALTH | Age: 89
End: 2025-05-06
Payer: MEDICARE

## 2025-05-06 VITALS
RESPIRATION RATE: 14 BRPM | DIASTOLIC BLOOD PRESSURE: 90 MMHG | SYSTOLIC BLOOD PRESSURE: 160 MMHG | OXYGEN SATURATION: 96 % | HEART RATE: 86 BPM

## 2025-05-06 DIAGNOSIS — S31.809A UNSPECIFIED OPEN WOUND OF UNSPECIFIED BUTTOCK, INITIAL ENCOUNTER: ICD-10-CM

## 2025-05-06 DIAGNOSIS — I87.8 OTHER SPECIFIED DISORDERS OF VEINS: ICD-10-CM

## 2025-05-06 DIAGNOSIS — F51.04 PSYCHOPHYSIOLOGIC INSOMNIA: ICD-10-CM

## 2025-05-06 DIAGNOSIS — R26.81 UNSTEADINESS ON FEET: ICD-10-CM

## 2025-05-06 DIAGNOSIS — Z09 ENCOUNTER FOR FOLLOW-UP EXAMINATION AFTER COMPLETED TREATMENT FOR CONDITIONS OTHER THAN MALIGNANT NEOPLASM: ICD-10-CM
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
## 2025-05-06 DIAGNOSIS — R91.8 OTHER NONSPECIFIC ABNORMAL FINDING OF LUNG FIELD: ICD-10-CM

## 2025-05-06 DIAGNOSIS — Z48.02 ENCOUNTER FOR REMOVAL OF SUTURES: ICD-10-CM

## 2025-05-06 DIAGNOSIS — G47.9 SLEEP DISORDER, UNSPECIFIED: ICD-10-CM

## 2025-05-06 PROCEDURE — 99348 HOME/RES VST EST LOW MDM 30: CPT

## 2025-05-07 RX ORDER — QUETIAPINE FUMARATE 25 MG/1
25 TABLET ORAL
Qty: 30 | Refills: 1 | Status: ACTIVE | COMMUNITY
Start: 2025-05-07 | End: 1900-01-01

## 2025-06-30 ENCOUNTER — RX RENEWAL (OUTPATIENT)
Age: 89
End: 2025-06-30

## 2025-07-22 NOTE — PATIENT PROFILE ADULT - VISION (WITH CORRECTIVE LENSES IF THE PATIENT USUALLY WEARS THEM):
5 (moderate pain) Severely impaired: cannot locate objects without hearing or touching them or patient nonresponsive.

## 2025-07-29 ENCOUNTER — RX CHANGE (OUTPATIENT)
Age: 89
End: 2025-07-29

## 2025-07-29 RX ORDER — QUETIAPINE FUMARATE 50 MG/1
50 TABLET ORAL
Qty: 90 | Refills: 2 | Status: ACTIVE | COMMUNITY
Start: 1900-01-01 | End: 1900-01-01

## 2025-08-11 ENCOUNTER — NON-APPOINTMENT (OUTPATIENT)
Age: 89
End: 2025-08-11

## (undated) DEVICE — STAPLER SKIN VISI-STAT 35 WIDE

## (undated) DEVICE — DRILL BIT STRYKER ORTHO 4.2 X 340MM

## (undated) DEVICE — POSITIONER FOAM HEAD CRADLE (PINK)

## (undated) DEVICE — GLV 8 PROTEXIS (BLUE)

## (undated) DEVICE — SUT POLYSORB 1 36" GS-21 UNDYED

## (undated) DEVICE — DRAPE C ARM C-ARMOUR

## (undated) DEVICE — DRAPE TOWEL BLUE 17" X 24"

## (undated) DEVICE — DRAPE INSTRUMENT POUCH 6.75" X 11"

## (undated) DEVICE — FOLEY TRAY 16FR SURESTEP LTX BG

## (undated) DEVICE — VENODYNE/SCD SLEEVE CALF LARGE

## (undated) DEVICE — SOL IRR POUR NS 0.9% 500ML

## (undated) DEVICE — POSITIONER STRAP ARMBOARD VELCRO TS-30

## (undated) DEVICE — PREP CHLORAPREP HI-LITE ORANGE 26ML

## (undated) DEVICE — DRSG ACE BANDAGE 4" NS

## (undated) DEVICE — GLV 8 PROTEXIS (WHITE)

## (undated) DEVICE — SUT POLYSORB 0 30" GS-21 UNDYED

## (undated) DEVICE — DRAPE ISOLATION 125X83"

## (undated) DEVICE — LAP PAD 18 X 18"

## (undated) DEVICE — SOLIDIFIER CANN EXPRESS 3K

## (undated) DEVICE — FOLEY HOLDER STATLOCK 2 WAY ADULT

## (undated) DEVICE — SOL IRR POUR H2O 1500ML

## (undated) DEVICE — PACK MINOR

## (undated) DEVICE — SUT POLYSORB 2-0 30" GS-21 UNDYED

## (undated) DEVICE — CANISTER SUCTION LID GUARD 3000CC

## (undated) DEVICE — WARMING BLANKET UPPER ADULT